# Patient Record
Sex: FEMALE | Race: WHITE | NOT HISPANIC OR LATINO
[De-identification: names, ages, dates, MRNs, and addresses within clinical notes are randomized per-mention and may not be internally consistent; named-entity substitution may affect disease eponyms.]

---

## 2020-11-30 PROBLEM — Z00.00 ENCOUNTER FOR PREVENTIVE HEALTH EXAMINATION: Status: ACTIVE | Noted: 2020-11-30

## 2020-12-02 ENCOUNTER — APPOINTMENT (OUTPATIENT)
Dept: RHEUMATOLOGY | Facility: CLINIC | Age: 70
End: 2020-12-02
Payer: MEDICARE

## 2020-12-02 VITALS
OXYGEN SATURATION: 96 % | TEMPERATURE: 98 F | HEIGHT: 62 IN | BODY MASS INDEX: 36.44 KG/M2 | WEIGHT: 198 LBS | DIASTOLIC BLOOD PRESSURE: 80 MMHG | SYSTOLIC BLOOD PRESSURE: 140 MMHG | HEART RATE: 77 BPM

## 2020-12-02 DIAGNOSIS — Z86.39 PERSONAL HISTORY OF OTHER ENDOCRINE, NUTRITIONAL AND METABOLIC DISEASE: ICD-10-CM

## 2020-12-02 DIAGNOSIS — Z78.9 OTHER SPECIFIED HEALTH STATUS: ICD-10-CM

## 2020-12-02 DIAGNOSIS — Z87.19 PERSONAL HISTORY OF OTHER DISEASES OF THE DIGESTIVE SYSTEM: ICD-10-CM

## 2020-12-02 DIAGNOSIS — Z82.61 FAMILY HISTORY OF ARTHRITIS: ICD-10-CM

## 2020-12-02 DIAGNOSIS — M79.10 MYALGIA, UNSPECIFIED SITE: ICD-10-CM

## 2020-12-02 DIAGNOSIS — Z86.79 PERSONAL HISTORY OF OTHER DISEASES OF THE CIRCULATORY SYSTEM: ICD-10-CM

## 2020-12-02 DIAGNOSIS — Z85.3 PERSONAL HISTORY OF MALIGNANT NEOPLASM OF BREAST: ICD-10-CM

## 2020-12-02 PROCEDURE — 99205 OFFICE O/P NEW HI 60 MIN: CPT

## 2020-12-02 RX ORDER — BUPROPION HYDROCHLORIDE 300 MG/1
300 TABLET, EXTENDED RELEASE ORAL
Refills: 0 | Status: ACTIVE | COMMUNITY

## 2020-12-02 RX ORDER — AMITRIPTYLINE HYDROCHLORIDE 25 MG/1
25 TABLET, FILM COATED ORAL
Refills: 0 | Status: ACTIVE | COMMUNITY

## 2020-12-02 RX ORDER — LOSARTAN POTASSIUM 100 MG/1
100 TABLET, FILM COATED ORAL
Refills: 0 | Status: ACTIVE | COMMUNITY

## 2020-12-02 RX ORDER — ROSUVASTATIN CALCIUM 20 MG/1
20 TABLET, FILM COATED ORAL
Refills: 0 | Status: ACTIVE | COMMUNITY

## 2020-12-02 RX ORDER — LETROZOLE 2.5 MG/1
2.5 TABLET, FILM COATED ORAL
Refills: 0 | Status: ACTIVE | COMMUNITY

## 2020-12-02 NOTE — ASSESSMENT
[FreeTextEntry1] : Patient has a history of breast cancer diagnosed 2 years ago s/p lumpectomy and radiation, HLD, HTN, IBS-D here for evaluation of pain.\par \par 1. Musculoskeletal pain\par \par -Based on history and physical exam she most likely has chronic pain/fibromyalgia. Discussed treatment options in detail and she wishes to increased duloxetine to 60 mg daily and start on flexeril 5 mg TID PRN. Advised exercise 150 minutes/week, increase healthy eating fruits/veggies at least 1/2 plate, eval/treatment of PRIYA.

## 2020-12-02 NOTE — HISTORY OF PRESENT ILLNESS
[FreeTextEntry1] : Patient has a history of breast cancer diagnosed 2 years ago s/p lumpectomy and radiation, HLD, HTN, IBS-D\par \par She reports pain in her arms and legs, feeling of bugs crawling on legs ongoing since 4 months. She saw twice that an ant was walking up her leg, and then again felt that sensation but no ant was present.  Femara was started June 2018. She was initially started on Arimidex and could not get up from the couch. Femara she stopped for 1 month and didn't notice a different in her symptoms and reports worsening symptoms. Now her pain stops her from ADLs. Denies joint pain, stiffness or swelling. Recently she fell and had x-rays on her knee that showed little arthritis. Reports IBS-D, headaches, memory fog. She can't take Advil because of IBS. She takes Tylenol for arthritis 2 tabs BID for the past two months, but is concerned about her liver. \par \par Denies rash, photosensitivity, raynaud's, oral ulcers, nasal ulcers, hair loss, sinus problems, nosebleeds, visual disturbances, dry eyes, dry mouth. \par \par She reports that her body pains started in 2008 after her  passed away. She saw Dr. Quiroz who diagnosed her with fibromyalgia.Over time the pain subsided and recently has returned. It involves her arms, forearms, thighs, and lower legs.

## 2020-12-02 NOTE — PHYSICAL EXAM
[General Appearance - Alert] : alert [General Appearance - In No Acute Distress] : in no acute distress [Sclera] : the sclera and conjunctiva were normal [PERRL With Normal Accommodation] : pupils were equal in size, round, and reactive to light [Extraocular Movements] : extraocular movements were intact [Outer Ear] : the ears and nose were normal in appearance [Oropharynx] : the oropharynx was normal [Neck Appearance] : the appearance of the neck was normal [Neck Cervical Mass (___cm)] : no neck mass was observed [Jugular Venous Distention Increased] : there was no jugular-venous distention [Thyroid Diffuse Enlargement] : the thyroid was not enlarged [Thyroid Nodule] : there were no palpable thyroid nodules [Auscultation Breath Sounds / Voice Sounds] : lungs were clear to auscultation bilaterally [Heart Rate And Rhythm] : heart rate was normal and rhythm regular [Heart Sounds] : normal S1 and S2 [Heart Sounds Gallop] : no gallops [Murmurs] : no murmurs [Heart Sounds Pericardial Friction Rub] : no pericardial rub [Bowel Sounds] : normal bowel sounds [Abdomen Soft] : soft [Abdomen Tenderness] : non-tender [Abdomen Mass (___ Cm)] : no abdominal mass palpated [Abnormal Walk] : normal gait [Nail Clubbing] : no clubbing  or cyanosis of the fingernails [Musculoskeletal - Swelling] : no joint swelling seen [Motor Tone] : muscle strength and tone were normal [FreeTextEntry1] : Mildly tender points bilateral upper extremities  [] : no rash [Skin Lesions] : no skin lesions [Sensation] : the sensory exam was normal to light touch and pinprick [Motor Exam] : the motor exam was normal [Affect] : the affect was normal [Mood] : the mood was normal

## 2020-12-03 DIAGNOSIS — M79.7 FIBROMYALGIA: ICD-10-CM

## 2020-12-12 ENCOUNTER — EMERGENCY (EMERGENCY)
Facility: HOSPITAL | Age: 70
LOS: 0 days | Discharge: HOME | End: 2020-12-12
Attending: STUDENT IN AN ORGANIZED HEALTH CARE EDUCATION/TRAINING PROGRAM
Payer: MEDICARE

## 2020-12-12 VITALS
SYSTOLIC BLOOD PRESSURE: 136 MMHG | HEIGHT: 62 IN | HEART RATE: 102 BPM | RESPIRATION RATE: 18 BRPM | DIASTOLIC BLOOD PRESSURE: 86 MMHG | TEMPERATURE: 99 F | WEIGHT: 190.04 LBS | OXYGEN SATURATION: 96 %

## 2020-12-12 VITALS
HEART RATE: 73 BPM | SYSTOLIC BLOOD PRESSURE: 130 MMHG | TEMPERATURE: 98 F | DIASTOLIC BLOOD PRESSURE: 69 MMHG | OXYGEN SATURATION: 95 % | RESPIRATION RATE: 18 BRPM

## 2020-12-12 DIAGNOSIS — R19.7 DIARRHEA, UNSPECIFIED: ICD-10-CM

## 2020-12-12 DIAGNOSIS — Z79.899 OTHER LONG TERM (CURRENT) DRUG THERAPY: ICD-10-CM

## 2020-12-12 DIAGNOSIS — N64.59 OTHER SIGNS AND SYMPTOMS IN BREAST: Chronic | ICD-10-CM

## 2020-12-12 DIAGNOSIS — N39.0 URINARY TRACT INFECTION, SITE NOT SPECIFIED: ICD-10-CM

## 2020-12-12 DIAGNOSIS — Z85.3 PERSONAL HISTORY OF MALIGNANT NEOPLASM OF BREAST: ICD-10-CM

## 2020-12-12 DIAGNOSIS — Z98.891 HISTORY OF UTERINE SCAR FROM PREVIOUS SURGERY: ICD-10-CM

## 2020-12-12 DIAGNOSIS — Z20.828 CONTACT WITH AND (SUSPECTED) EXPOSURE TO OTHER VIRAL COMMUNICABLE DISEASES: ICD-10-CM

## 2020-12-12 LAB
ALBUMIN SERPL ELPH-MCNC: 5.4 G/DL — HIGH (ref 3.5–5.2)
ALP SERPL-CCNC: 71 U/L — SIGNIFICANT CHANGE UP (ref 30–115)
ALT FLD-CCNC: 12 U/L — SIGNIFICANT CHANGE UP (ref 0–41)
ANION GAP SERPL CALC-SCNC: 11 MMOL/L — SIGNIFICANT CHANGE UP (ref 7–14)
APPEARANCE UR: CLEAR — SIGNIFICANT CHANGE UP
AST SERPL-CCNC: 15 U/L — SIGNIFICANT CHANGE UP (ref 0–41)
BACTERIA # UR AUTO: ABNORMAL /HPF
BASOPHILS # BLD AUTO: 0.03 K/UL — SIGNIFICANT CHANGE UP (ref 0–0.2)
BASOPHILS NFR BLD AUTO: 0.4 % — SIGNIFICANT CHANGE UP (ref 0–1)
BILIRUB SERPL-MCNC: 0.9 MG/DL — SIGNIFICANT CHANGE UP (ref 0.2–1.2)
BILIRUB UR-MCNC: ABNORMAL
BUN SERPL-MCNC: 16 MG/DL — SIGNIFICANT CHANGE UP (ref 10–20)
C DIFF BY PCR RESULT: NEGATIVE — SIGNIFICANT CHANGE UP
C DIFF TOX GENS STL QL NAA+PROBE: SIGNIFICANT CHANGE UP
CALCIUM SERPL-MCNC: 11.1 MG/DL — HIGH (ref 8.5–10.1)
CHLORIDE SERPL-SCNC: 103 MMOL/L — SIGNIFICANT CHANGE UP (ref 98–110)
CO2 SERPL-SCNC: 24 MMOL/L — SIGNIFICANT CHANGE UP (ref 17–32)
COD CRY URNS QL: NEGATIVE — SIGNIFICANT CHANGE UP
COLOR SPEC: YELLOW — SIGNIFICANT CHANGE UP
CREAT SERPL-MCNC: 1.2 MG/DL — SIGNIFICANT CHANGE UP (ref 0.7–1.5)
DIFF PNL FLD: NEGATIVE — SIGNIFICANT CHANGE UP
EOSINOPHIL # BLD AUTO: 0.13 K/UL — SIGNIFICANT CHANGE UP (ref 0–0.7)
EOSINOPHIL NFR BLD AUTO: 1.9 % — SIGNIFICANT CHANGE UP (ref 0–8)
EPI CELLS # UR: ABNORMAL /HPF
GLUCOSE SERPL-MCNC: 94 MG/DL — SIGNIFICANT CHANGE UP (ref 70–99)
GLUCOSE UR QL: NEGATIVE MG/DL — SIGNIFICANT CHANGE UP
GRAN CASTS # UR COMP ASSIST: NEGATIVE — SIGNIFICANT CHANGE UP
HCT VFR BLD CALC: 42.6 % — SIGNIFICANT CHANGE UP (ref 37–47)
HGB BLD-MCNC: 14 G/DL — SIGNIFICANT CHANGE UP (ref 12–16)
HYALINE CASTS # UR AUTO: NEGATIVE — SIGNIFICANT CHANGE UP
IMM GRANULOCYTES NFR BLD AUTO: 0.4 % — HIGH (ref 0.1–0.3)
KETONES UR-MCNC: 15
LACTATE SERPL-SCNC: 1.1 MMOL/L — SIGNIFICANT CHANGE UP (ref 0.7–2)
LEUKOCYTE ESTERASE UR-ACNC: ABNORMAL
LIDOCAIN IGE QN: 24 U/L — SIGNIFICANT CHANGE UP (ref 7–60)
LYMPHOCYTES # BLD AUTO: 2.03 K/UL — SIGNIFICANT CHANGE UP (ref 1.2–3.4)
LYMPHOCYTES # BLD AUTO: 29.9 % — SIGNIFICANT CHANGE UP (ref 20.5–51.1)
MAGNESIUM SERPL-MCNC: 1.5 MG/DL — LOW (ref 1.8–2.4)
MCHC RBC-ENTMCNC: 31.8 PG — HIGH (ref 27–31)
MCHC RBC-ENTMCNC: 32.9 G/DL — SIGNIFICANT CHANGE UP (ref 32–37)
MCV RBC AUTO: 96.8 FL — SIGNIFICANT CHANGE UP (ref 81–99)
MONOCYTES # BLD AUTO: 0.54 K/UL — SIGNIFICANT CHANGE UP (ref 0.1–0.6)
MONOCYTES NFR BLD AUTO: 8 % — SIGNIFICANT CHANGE UP (ref 1.7–9.3)
NEUTROPHILS # BLD AUTO: 4.03 K/UL — SIGNIFICANT CHANGE UP (ref 1.4–6.5)
NEUTROPHILS NFR BLD AUTO: 59.4 % — SIGNIFICANT CHANGE UP (ref 42.2–75.2)
NITRITE UR-MCNC: NEGATIVE — SIGNIFICANT CHANGE UP
NRBC # BLD: 0 /100 WBCS — SIGNIFICANT CHANGE UP (ref 0–0)
PH UR: 5.5 — SIGNIFICANT CHANGE UP (ref 5–8)
PLATELET # BLD AUTO: 267 K/UL — SIGNIFICANT CHANGE UP (ref 130–400)
POTASSIUM SERPL-MCNC: 4.2 MMOL/L — SIGNIFICANT CHANGE UP (ref 3.5–5)
POTASSIUM SERPL-SCNC: 4.2 MMOL/L — SIGNIFICANT CHANGE UP (ref 3.5–5)
PROT SERPL-MCNC: 7.8 G/DL — SIGNIFICANT CHANGE UP (ref 6–8)
PROT UR-MCNC: >=300 MG/DL
RAPID RVP RESULT: SIGNIFICANT CHANGE UP
RBC # BLD: 4.4 M/UL — SIGNIFICANT CHANGE UP (ref 4.2–5.4)
RBC # FLD: 13.1 % — SIGNIFICANT CHANGE UP (ref 11.5–14.5)
RBC CASTS # UR COMP ASSIST: NEGATIVE — SIGNIFICANT CHANGE UP
SARS-COV-2 RNA SPEC QL NAA+PROBE: SIGNIFICANT CHANGE UP
SODIUM SERPL-SCNC: 138 MMOL/L — SIGNIFICANT CHANGE UP (ref 135–146)
SP GR SPEC: >=1.03 (ref 1.01–1.03)
TRI-PHOS CRY UR QL COMP ASSIST: NEGATIVE — SIGNIFICANT CHANGE UP
URATE CRY FLD QL MICRO: NEGATIVE — SIGNIFICANT CHANGE UP
UROBILINOGEN FLD QL: 1 MG/DL (ref 0.2–0.2)
WBC # BLD: 6.79 K/UL — SIGNIFICANT CHANGE UP (ref 4.8–10.8)
WBC # FLD AUTO: 6.79 K/UL — SIGNIFICANT CHANGE UP (ref 4.8–10.8)
WBC UR QL: SIGNIFICANT CHANGE UP /HPF

## 2020-12-12 PROCEDURE — 99284 EMERGENCY DEPT VISIT MOD MDM: CPT | Mod: CS

## 2020-12-12 PROCEDURE — 74177 CT ABD & PELVIS W/CONTRAST: CPT | Mod: 26

## 2020-12-12 RX ORDER — SODIUM CHLORIDE 9 MG/ML
1000 INJECTION INTRAMUSCULAR; INTRAVENOUS; SUBCUTANEOUS ONCE
Refills: 0 | Status: COMPLETED | OUTPATIENT
Start: 2020-12-12 | End: 2020-12-12

## 2020-12-12 RX ORDER — AZTREONAM 2 G
1 VIAL (EA) INJECTION
Qty: 14 | Refills: 0
Start: 2020-12-12 | End: 2020-12-18

## 2020-12-12 RX ORDER — MAGNESIUM SULFATE 500 MG/ML
2 VIAL (ML) INJECTION ONCE
Refills: 0 | Status: COMPLETED | OUTPATIENT
Start: 2020-12-12 | End: 2020-12-12

## 2020-12-12 RX ADMIN — SODIUM CHLORIDE 1000 MILLILITER(S): 9 INJECTION INTRAMUSCULAR; INTRAVENOUS; SUBCUTANEOUS at 21:11

## 2020-12-12 RX ADMIN — SODIUM CHLORIDE 1000 MILLILITER(S): 9 INJECTION INTRAMUSCULAR; INTRAVENOUS; SUBCUTANEOUS at 19:19

## 2020-12-12 RX ADMIN — Medication 50 GRAM(S): at 21:11

## 2020-12-12 NOTE — ED ADULT NURSE NOTE - NSIMPLEMENTINTERV_GEN_ALL_ED
Implemented All Universal Safety Interventions:  [place] to call system. Call bell, personal items and telephone within reach. Instruct patient to call for assistance. Room bathroom lighting operational. Non-slip footwear when patient is off stretcher. Physically safe environment: no spills, clutter or unnecessary equipment. Stretcher in lowest position, wheels locked, appropriate side rails in place.

## 2020-12-12 NOTE — ED PROVIDER NOTE - NSFOLLOWUPINSTRUCTIONS_ED_ALL_ED_FT
Urinary Tract Infection    A urinary tract infection (UTI) is an infection of any part of the urinary tract, which includes the kidneys, ureters, bladder, and urethra. Risk factors include ignoring your need to urinate, wiping back to front if female, being an uncircumcised male, and having diabetes or a weak immune system. Symptoms include frequent urination, pain or burning with urination, foul smelling urine, cloudy urine, pain in the lower abdomen, blood in the urine, and fever. If you were prescribed an antibiotic medicine, take it as told by your health care provider. Do not stop taking the antibiotic even if you start to feel better.    SEEK IMMEDIATE MEDICAL CARE IF YOU HAVE ANY OF THE FOLLOWING SYMPTOMS: severe back or abdominal pain, fever, inability to keep fluids or medicine down, dizziness/lightheadedness, or a change in mental status.      Diarrhea    Diarrhea is frequent loose or watery bowel movements that has many causes. Diarrhea can make you feel weak and cause you to become dehydrated. Diarrhea typically lasts 2–3 days, but can last longer if it is a sign of something more serious. Drink clear fluids to prevent dehydration. Eat bland, easy-to-digest foods as tolerated.     SEEK IMMEDIATE MEDICAL CARE IF YOU HAVE ANY OF THE FOLLOWING SYMPTOMS: high fevers, lightheadedness/dizziness, chest pain, black or bloody stools, shortness of breath, severe abdominal or back pain, or any signs of dehydration.

## 2020-12-12 NOTE — ED PROVIDER NOTE - PATIENT PORTAL LINK FT
You can access the FollowMyHealth Patient Portal offered by [facility] by registering at the following website: [email or web address]. By joining [employer]’s FollowMyHealth portal, you will also be able to view your health information using other applications (apps) compatible with our system.

## 2020-12-12 NOTE — ED PROVIDER NOTE - PHYSICAL EXAMINATION
Vital Signs: I have reviewed the initial vital signs.  Constitutional: well-nourished, no acute distress  Eyes: PERRLA, EOMI, clear conjunctiva  ENT: MMM,   Cardiovascular: regular rate, regular rhythm, no murmur appreciated  Respiratory: unlabored respiratory effort, clear to auscultation bilaterally    Musculoskeletal: supple neck, no lower extremity edema, no bony tenderness  Integumentary: warm, dry, no rash  Neurologic: awake, alert, cranial nerves II-XII grossly intact, extremities’ motor and sensory functions grossly intact, no focal deficits, GCS 15

## 2020-12-12 NOTE — ED PROVIDER NOTE - PROGRESS NOTE DETAILS
spoke with patient regarding diagnostics . patient feeling improved. will dc home urology follow up and antibx

## 2020-12-12 NOTE — ED PROVIDER NOTE - NS ED ROS FT
Review of Systems    Constitutional: (-) fever/ chills (+)loss of appetite  Eyes (-) visual changes  ENT: (-) epistaxis (-) sore throat (-) ear pain  Cardiovascular: (-) chest pain, (-) syncope (-) palpitations  Respiratory: (-) cough, (-) shortness of breath  Gastrointestinal: (-) vomiting, (+) diarrhea (+) abdominal pain  : (-) dysuria , hematuria   neck: (-) neck pain or stiffness  Musculoskeletal:  (-) back pain, (-) joint pain   Integumentary: (-) rash, (-) swelling  Neurological: (-) headache, (-) altered mental status  Psychiatric: (-) hallucinations or depression

## 2020-12-12 NOTE — ED PROVIDER NOTE - CLINICAL SUMMARY MEDICAL DECISION MAKING FREE TEXT BOX
69 y/o female with hx of fibromyalgia, HTN, pancreatitis, c.diff presents to the ED with watery diarrhea with decreased po intake .patient c/o weakness and nausea. patient c/o mild abdominal cramping. patient denies any back pain. no dysuria or fever. patient with decreased urinary output. patient denies any cp, sob, palpitations. patient denies any black or bloody stools. Plan labs/CT d/c out pt f/u

## 2020-12-12 NOTE — ED PROVIDER NOTE - OBJECTIVE STATEMENT
69 y/o female with hx of fibromyalgia, HTN, pancreatitis, c.diff presents to the ED with watery diarrhea with decreased po intake .patient c/o weakness and nausea. patient c/o mild abdominal cramping. patient denies any back pain. no dysuria or fever. patient with decreased urinary output. patient denies any cp, sob, palpitations. patient denies any black or bloody stools.

## 2020-12-12 NOTE — ED PROVIDER NOTE - CARE PROVIDER_API CALL
Moreno Hanley)  Urology  [address] [facility] [facility] [place] [address]  Phone: [phone]  Fax: [phone]  Follow Up Time:

## 2020-12-14 ENCOUNTER — APPOINTMENT (OUTPATIENT)
Dept: UROLOGY | Facility: CLINIC | Age: 70
End: 2020-12-14

## 2020-12-14 LAB
CULTURE RESULTS: SIGNIFICANT CHANGE UP
SPECIMEN SOURCE: SIGNIFICANT CHANGE UP

## 2020-12-17 PROBLEM — C50.919 MALIGNANT NEOPLASM OF UNSPECIFIED SITE OF UNSPECIFIED FEMALE BREAST: Chronic | Status: ACTIVE | Noted: 2020-12-12

## 2020-12-17 PROBLEM — K85.10 BILIARY ACUTE PANCREATITIS WITHOUT NECROSIS OR INFECTION: Chronic | Status: ACTIVE | Noted: 2020-12-12

## 2021-02-02 ENCOUNTER — APPOINTMENT (OUTPATIENT)
Dept: GASTROENTEROLOGY | Facility: CLINIC | Age: 71
End: 2021-02-02

## 2021-02-09 ENCOUNTER — APPOINTMENT (OUTPATIENT)
Dept: GASTROENTEROLOGY | Facility: CLINIC | Age: 71
End: 2021-02-09
Payer: MEDICARE

## 2021-02-09 DIAGNOSIS — R10.9 UNSPECIFIED ABDOMINAL PAIN: ICD-10-CM

## 2021-02-09 PROCEDURE — 99204 OFFICE O/P NEW MOD 45 MIN: CPT | Mod: 95

## 2021-02-09 RX ORDER — CYCLOBENZAPRINE HYDROCHLORIDE 5 MG/1
5 TABLET, FILM COATED ORAL 3 TIMES DAILY
Qty: 90 | Refills: 2 | Status: DISCONTINUED | COMMUNITY
Start: 2020-12-02 | End: 2021-02-09

## 2021-02-09 NOTE — PHYSICAL EXAM
[General Appearance - Alert] : alert [Sclera] : the sclera and conjunctiva were normal [] : no respiratory distress [Skin Color & Pigmentation] : normal skin color and pigmentation [Oriented To Time, Place, And Person] : oriented to person, place, and time

## 2021-02-11 ENCOUNTER — APPOINTMENT (OUTPATIENT)
Dept: RHEUMATOLOGY | Facility: CLINIC | Age: 71
End: 2021-02-11
Payer: MEDICARE

## 2021-02-11 VITALS
HEART RATE: 80 BPM | SYSTOLIC BLOOD PRESSURE: 138 MMHG | OXYGEN SATURATION: 96 % | TEMPERATURE: 98 F | DIASTOLIC BLOOD PRESSURE: 78 MMHG

## 2021-02-11 PROCEDURE — 99213 OFFICE O/P EST LOW 20 MIN: CPT

## 2021-02-11 RX ORDER — DULOXETINE HYDROCHLORIDE 30 MG/1
30 CAPSULE, DELAYED RELEASE PELLETS ORAL
Refills: 0 | Status: DISCONTINUED | COMMUNITY
End: 2021-02-11

## 2021-02-11 RX ORDER — DULOXETINE HYDROCHLORIDE 60 MG/1
60 CAPSULE, DELAYED RELEASE PELLETS ORAL
Qty: 90 | Refills: 0 | Status: ACTIVE | COMMUNITY
Start: 2020-12-03 | End: 1900-01-01

## 2021-02-15 NOTE — PHYSICAL EXAM
[General Appearance - Alert] : alert [General Appearance - In No Acute Distress] : in no acute distress [PERRL With Normal Accommodation] : pupils were equal in size, round, and reactive to light [Sclera] : the sclera and conjunctiva were normal [Extraocular Movements] : extraocular movements were intact [Outer Ear] : the ears and nose were normal in appearance [Oropharynx] : the oropharynx was normal [Neck Appearance] : the appearance of the neck was normal [Neck Cervical Mass (___cm)] : no neck mass was observed [Jugular Venous Distention Increased] : there was no jugular-venous distention [Thyroid Diffuse Enlargement] : the thyroid was not enlarged [Thyroid Nodule] : there were no palpable thyroid nodules [Auscultation Breath Sounds / Voice Sounds] : lungs were clear to auscultation bilaterally [Heart Rate And Rhythm] : heart rate was normal and rhythm regular [Heart Sounds Gallop] : no gallops [Heart Sounds] : normal S1 and S2 [Murmurs] : no murmurs [Heart Sounds Pericardial Friction Rub] : no pericardial rub [Bowel Sounds] : normal bowel sounds [Abdomen Soft] : soft [Abdomen Tenderness] : non-tender [Abdomen Mass (___ Cm)] : no abdominal mass palpated [Abnormal Walk] : normal gait [Nail Clubbing] : no clubbing  or cyanosis of the fingernails [Musculoskeletal - Swelling] : no joint swelling seen [Motor Tone] : muscle strength and tone were normal [] : no rash [Skin Lesions] : no skin lesions [Sensation] : the sensory exam was normal to light touch and pinprick [Motor Exam] : the motor exam was normal [Affect] : the affect was normal [Mood] : the mood was normal [FreeTextEntry1] : Mildly tender points bilateral upper extremities

## 2021-02-15 NOTE — ASSESSMENT
[FreeTextEntry1] : Patient has a history of breast cancer diagnosed 2 years ago s/p lumpectomy and radiation, HLD, HTN, IBS-D here for evaluation of pain.\par \par 1. Fibromyalgia\par \par -Improved and stable\par -Discussed treatment options including increased exercise and healthy diet\par -Continue Cymbalta\par -Follow up with PCP and f/u PRN

## 2021-03-08 DIAGNOSIS — Z87.891 PERSONAL HISTORY OF NICOTINE DEPENDENCE: ICD-10-CM

## 2021-03-08 DIAGNOSIS — I10 ESSENTIAL (PRIMARY) HYPERTENSION: ICD-10-CM

## 2021-03-08 DIAGNOSIS — J94.9 PLEURAL CONDITION, UNSPECIFIED: ICD-10-CM

## 2021-03-08 DIAGNOSIS — K21.9 GASTRO-ESOPHAGEAL REFLUX DISEASE W/OUT ESOPHAGITIS: ICD-10-CM

## 2021-03-08 RX ORDER — OMEPRAZOLE 40 MG/1
40 CAPSULE, DELAYED RELEASE ORAL
Refills: 0 | Status: ACTIVE | COMMUNITY

## 2021-03-22 ENCOUNTER — APPOINTMENT (OUTPATIENT)
Dept: PULMONOLOGY | Facility: CLINIC | Age: 71
End: 2021-03-22
Payer: MEDICARE

## 2021-03-22 VITALS
SYSTOLIC BLOOD PRESSURE: 130 MMHG | DIASTOLIC BLOOD PRESSURE: 80 MMHG | RESPIRATION RATE: 12 BRPM | BODY MASS INDEX: 36.44 KG/M2 | HEIGHT: 62 IN | HEART RATE: 84 BPM | OXYGEN SATURATION: 96 % | WEIGHT: 198 LBS

## 2021-03-22 DIAGNOSIS — G47.33 OBSTRUCTIVE SLEEP APNEA (ADULT) (PEDIATRIC): ICD-10-CM

## 2021-03-22 PROCEDURE — 99213 OFFICE O/P EST LOW 20 MIN: CPT

## 2021-03-22 NOTE — HISTORY OF PRESENT ILLNESS
[Follow-Up - Routine Clinic] : a routine clinic follow-up of [None] : No associated symptoms are reported [CPAP] : CPAP [Fair Compliance] : fair compliance with treatment [Fair Tolerance] : fair tolerance of treatment [Fair Symptom Control] : fair symptom control [de-identified] : significant PRIYA on test has auto CPAP [de-identified] : has some isues . Had eye surgery preventing use.

## 2021-03-22 NOTE — ASSESSMENT
[FreeTextEntry1] : PLAN\par \par New supplies\par Continue the use of CPAP\par Weight loss\par F/U in 6 months\par The patient is benefitting from the CPAP\par Stress the need maintain better compliance  with the CPAP.\par

## 2021-04-02 NOTE — HISTORY OF PRESENT ILLNESS
[Home] : at home, [unfilled] , at the time of the visit. [Verbal consent obtained from patient] : the patient, [unfilled] [Medical Office: ([facility])___] : at the medical office located in  [FreeTextEntry4] : Aimee Christianson [de-identified] : 70 year old female average risk for CRC,  with hx colon polyps since the age of 8 years, anal fissures, presents with chronic diarrhea for years on daily imodium, non bloody, no weight loss, nausea, vomiting but lower pelvic crampy abdominal pain that does exacerbate after defecation. \par Last colonoscopy was in dec 2020, and EGD in 2020, awaiting results to be faxed. \par Was in ED this past Dec with unremarkable CBC, CMP, and contrast CT of abdomen .

## 2021-04-02 NOTE — ASSESSMENT
[FreeTextEntry1] : 70 year old female average risk for CRC,  with hx colon polyps since the age of 8 years, anal fissures, presents with chronic diarrhea (7-8  BMs daily)for years on daily imodium, non bloody, no weight loss, nausea, vomiting but lower pelvic crampy abdominal pain that does exacerbate after defecation. \par Last colonoscopy was in dec 2020, and EGD in 2020, awaiting results to be faxed. \par Was in ED this past Dec with unremarkable CBC, CMP, and contrast CT of abdomen . \par \par \par Likely IBS-D\par might need to repeat repeat EGD, colonoscopy after reviewing last procedures.\par Continue same meds for now. \par will f/up afterwards. \par will consider rifaximin as well. \par \par 3/2/2021:\par Obtained records which showed lymphocytic colitis on bx\par Rec: [id number] 2 tab tid PRN for diarhhea\par RTC if not better, will consider budesonide then \par No need to repeat scopes at this point.\par Spoke to pt and discussed plan.

## 2021-05-18 ENCOUNTER — RX RENEWAL (OUTPATIENT)
Age: 71
End: 2021-05-18

## 2021-09-07 ENCOUNTER — APPOINTMENT (OUTPATIENT)
Dept: GASTROENTEROLOGY | Facility: CLINIC | Age: 71
End: 2021-09-07

## 2021-10-06 PROBLEM — I10 ESSENTIAL HYPERTENSION: Status: ACTIVE | Noted: 2021-03-08

## 2021-10-08 ENCOUNTER — APPOINTMENT (OUTPATIENT)
Dept: NEUROLOGY | Facility: CLINIC | Age: 71
End: 2021-10-08
Payer: MEDICARE

## 2021-10-08 ENCOUNTER — NON-APPOINTMENT (OUTPATIENT)
Age: 71
End: 2021-10-08

## 2021-10-08 VITALS
HEART RATE: 76 BPM | DIASTOLIC BLOOD PRESSURE: 103 MMHG | HEIGHT: 62 IN | TEMPERATURE: 97 F | OXYGEN SATURATION: 98 % | BODY MASS INDEX: 36.44 KG/M2 | SYSTOLIC BLOOD PRESSURE: 153 MMHG | WEIGHT: 198 LBS
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
## 2021-10-08 DIAGNOSIS — G40.909 EPILEPSY, UNSPECIFIED, NOT INTRACTABLE, W/OUT STATUS EPILEPTICUS: ICD-10-CM

## 2021-10-08 DIAGNOSIS — G25.3 MYOCLONUS: ICD-10-CM

## 2021-10-08 PROCEDURE — 99203 OFFICE O/P NEW LOW 30 MIN: CPT

## 2021-10-08 NOTE — REVIEW OF SYSTEMS
[Ataxia] : ataxia [Frequent Falls] : frequent falls [Anxiety] : anxiety [Depression] : depression [Constipation] : constipation [Negative] : Integumentary

## 2021-10-08 NOTE — ASSESSMENT
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 On exam there is no sing of parkinsonism but patient reports vivid dreams. The jerky movement of the left hand is suggestive of myoclonus but would like to r/o seizure given the history of breast cancer and history of secondary seizure. Patient is also on multiple medications including Wellbutrin and Duloxetine with adverse reaction of seizure. On exam she has positive Boswell with mild brisk reflex for which I would like to r/o cervical myelopathy specially with fall history. \par \par - MRI brain \par - MRI C spine\par - REEG and 72 hours ambulatory EEG\par - Will monitor the tremor symptoms, but she will try to taper her off from Duloxetine. \par - RTC in 4-5 months

## 2021-10-08 NOTE — HISTORY OF PRESENT ILLNESS
[FreeTextEntry1] : MANDY THOMPSON is a 71 year old woman with history of fibromyalgia, breast cancer on remission, colitis, PRIYA and IBS presents as a new patient to be assessed for new onset left hand tremor and jerky movement of left hand for past year. Tremor mainly happens on holding objects. Denies tremor in the head, voice or in the right side. Denies stiffness or slow movement. She has history of depression and takes Wellbutrin. Also takes Elavil for Colitis and Duloxetine for fibromyalgia. Has 4 falls for past years. Reports walking up the curve and slipping on the floor. Denies dizziness or prodromal symptoms. Did not fall backward. Denies family history or tremor or relationship with drinking. She reports nightmare and vivid dreams every night. She has history of low back pain with no sciatica and received LSI for that. She reports chronic history of jerky movement in the left pinky but reports new onset jerky movement of left hand on sleep.

## 2021-10-08 NOTE — PHYSICAL EXAM
[FreeTextEntry1] : Mental status: Awake, alert and oriented x3.  Recent and remote memory intact.  Naming, repetition and comprehension intact.  Attention/concentration intact.  No dysarthria, no aphasia.  Fund of knowledge appropriate.  \par Cranial nerves: Pupils equally round and reactive to light, visual fields full, no nystagmus, extraocular muscles intact, V1 through V3 intact bilaterally and symmetric, face symmetric, hearing intact to finger rub, palate elevation symmetric, tongue was midline.\par Motor:  MRC grading 5/5 b/l UE/LE.   strength 5/5.  Normal tone and bulk.  No abnormal movements.  No rigidity. No bradykinesia. No resting tremor. Mild positional tremor on holding the cup. \par Sensation: Intact to light touch, proprioception, and pinprick. \par Coordination: No dysmetria on finger-to-nose and heel-to-shin.  No dysdiadokinesia.\par Reflexes: 3+ in bilateral UE/LE, downgoing toes bilaterally. (+) Boswell.\par Gait: Narrow and steady. No ataxia.  Romberg negative. Negative retropulsion. \par \par

## 2021-10-15 ENCOUNTER — TRANSCRIPTION ENCOUNTER (OUTPATIENT)
Age: 71
End: 2021-10-15

## 2021-10-19 DIAGNOSIS — K52.9 NONINFECTIVE GASTROENTERITIS AND COLITIS, UNSPECIFIED: ICD-10-CM

## 2021-10-19 DIAGNOSIS — Z86.010 PERSONAL HISTORY OF COLONIC POLYPS: ICD-10-CM

## 2021-10-19 DIAGNOSIS — K52.832 LYMPHOCYTIC COLITIS: ICD-10-CM

## 2021-10-20 ENCOUNTER — OUTPATIENT (OUTPATIENT)
Dept: OUTPATIENT SERVICES | Facility: HOSPITAL | Age: 71
LOS: 1 days | Discharge: HOME | End: 2021-10-20
Payer: MEDICARE

## 2021-10-20 ENCOUNTER — APPOINTMENT (OUTPATIENT)
Dept: NEUROLOGY | Facility: CLINIC | Age: 71
End: 2021-10-20
Payer: MEDICARE

## 2021-10-20 ENCOUNTER — RESULT REVIEW (OUTPATIENT)
Age: 71
End: 2021-10-20

## 2021-10-20 DIAGNOSIS — N64.59 OTHER SIGNS AND SYMPTOMS IN BREAST: Chronic | ICD-10-CM

## 2021-10-20 DIAGNOSIS — Z91.81 HISTORY OF FALLING: ICD-10-CM

## 2021-10-20 PROCEDURE — 70551 MRI BRAIN STEM W/O DYE: CPT | Mod: 26,MH

## 2021-10-20 PROCEDURE — 72141 MRI NECK SPINE W/O DYE: CPT | Mod: 26,MH

## 2021-10-20 PROCEDURE — 95816 EEG AWAKE AND DROWSY: CPT

## 2021-10-25 DIAGNOSIS — M50.90 CERVICAL DISC DISORDER, UNSPECIFIED, UNSPECIFIED CERVICAL REGION: ICD-10-CM

## 2021-10-26 ENCOUNTER — LABORATORY RESULT (OUTPATIENT)
Age: 71
End: 2021-10-26

## 2021-10-26 ENCOUNTER — OUTPATIENT (OUTPATIENT)
Dept: OUTPATIENT SERVICES | Facility: HOSPITAL | Age: 71
LOS: 1 days | Discharge: HOME | End: 2021-10-26

## 2021-10-26 DIAGNOSIS — Z11.59 ENCOUNTER FOR SCREENING FOR OTHER VIRAL DISEASES: ICD-10-CM

## 2021-10-26 DIAGNOSIS — N64.59 OTHER SIGNS AND SYMPTOMS IN BREAST: Chronic | ICD-10-CM

## 2021-10-29 ENCOUNTER — OUTPATIENT (OUTPATIENT)
Dept: OUTPATIENT SERVICES | Facility: HOSPITAL | Age: 71
LOS: 1 days | Discharge: HOME | End: 2021-10-29
Payer: MEDICARE

## 2021-10-29 ENCOUNTER — RESULT REVIEW (OUTPATIENT)
Age: 71
End: 2021-10-29

## 2021-10-29 ENCOUNTER — TRANSCRIPTION ENCOUNTER (OUTPATIENT)
Age: 71
End: 2021-10-29

## 2021-10-29 VITALS — DIASTOLIC BLOOD PRESSURE: 84 MMHG | HEART RATE: 62 BPM | SYSTOLIC BLOOD PRESSURE: 142 MMHG

## 2021-10-29 VITALS
SYSTOLIC BLOOD PRESSURE: 139 MMHG | RESPIRATION RATE: 18 BRPM | TEMPERATURE: 98 F | HEIGHT: 62 IN | DIASTOLIC BLOOD PRESSURE: 82 MMHG | HEART RATE: 71 BPM | WEIGHT: 190.04 LBS | OXYGEN SATURATION: 96 %

## 2021-10-29 DIAGNOSIS — N64.59 OTHER SIGNS AND SYMPTOMS IN BREAST: Chronic | ICD-10-CM

## 2021-10-29 PROCEDURE — 45385 COLONOSCOPY W/LESION REMOVAL: CPT

## 2021-10-29 PROCEDURE — 45380 COLONOSCOPY AND BIOPSY: CPT | Mod: XU

## 2021-10-29 PROCEDURE — 88305 TISSUE EXAM BY PATHOLOGIST: CPT | Mod: 26

## 2021-10-29 RX ORDER — SODIUM CHLORIDE 9 MG/ML
1000 INJECTION, SOLUTION INTRAVENOUS
Refills: 0 | Status: DISCONTINUED | OUTPATIENT
Start: 2021-10-29 | End: 2021-11-12

## 2021-10-29 NOTE — ASU DISCHARGE PLAN (ADULT/PEDIATRIC) - CARE PROVIDER_API CALL
Lorrie Babin (MD)  Gastroenterology  [address] [place] [address]  Phone: [phone]  Fax: [phone]  Follow Up Time:

## 2021-10-29 NOTE — ASU PATIENT PROFILE, ADULT - PRO INTERPRETER NEED 2
Post-Care Instructions: Detail post-care instructions were given and reviewed. Patient is to keep the biopsy site dry overnight, and then apply vaseline daily until healed. Discussed to call the office if any questions or problems at [phone]. Post-Care Instructions: Detail post-care instructions were given and reviewed. Patient is to keep the biopsy site dry overnight, and then apply vaseline daily until healed. Discussed to call the office if any questions or problems at [phone]. English

## 2021-11-01 LAB — SURGICAL PATHOLOGY STUDY: SIGNIFICANT CHANGE UP

## 2021-11-04 DIAGNOSIS — Z88.0 ALLERGY STATUS TO PENICILLIN: ICD-10-CM

## 2021-11-04 DIAGNOSIS — D12.3 BENIGN NEOPLASM OF TRANSVERSE COLON: ICD-10-CM

## 2021-11-04 DIAGNOSIS — D12.8 BENIGN NEOPLASM OF RECTUM: ICD-10-CM

## 2021-11-04 DIAGNOSIS — K64.8 OTHER HEMORRHOIDS: ICD-10-CM

## 2021-11-04 DIAGNOSIS — Z85.3 PERSONAL HISTORY OF MALIGNANT NEOPLASM OF BREAST: ICD-10-CM

## 2021-11-04 DIAGNOSIS — R19.7 DIARRHEA, UNSPECIFIED: ICD-10-CM

## 2021-11-04 DIAGNOSIS — D12.4 BENIGN NEOPLASM OF DESCENDING COLON: ICD-10-CM

## 2021-11-04 DIAGNOSIS — K57.30 DIVERTICULOSIS OF LARGE INTESTINE WITHOUT PERFORATION OR ABSCESS WITHOUT BLEEDING: ICD-10-CM

## 2021-11-26 ENCOUNTER — EMERGENCY (EMERGENCY)
Facility: HOSPITAL | Age: 71
LOS: 0 days | Discharge: HOME | End: 2021-11-27
Attending: STUDENT IN AN ORGANIZED HEALTH CARE EDUCATION/TRAINING PROGRAM | Admitting: STUDENT IN AN ORGANIZED HEALTH CARE EDUCATION/TRAINING PROGRAM
Payer: MEDICARE

## 2021-11-26 VITALS
OXYGEN SATURATION: 98 % | HEART RATE: 80 BPM | TEMPERATURE: 97 F | SYSTOLIC BLOOD PRESSURE: 140 MMHG | RESPIRATION RATE: 18 BRPM | DIASTOLIC BLOOD PRESSURE: 92 MMHG

## 2021-11-26 DIAGNOSIS — K56.7 ILEUS, UNSPECIFIED: ICD-10-CM

## 2021-11-26 DIAGNOSIS — R19.7 DIARRHEA, UNSPECIFIED: ICD-10-CM

## 2021-11-26 DIAGNOSIS — Z88.0 ALLERGY STATUS TO PENICILLIN: ICD-10-CM

## 2021-11-26 DIAGNOSIS — R14.0 ABDOMINAL DISTENSION (GASEOUS): ICD-10-CM

## 2021-11-26 DIAGNOSIS — R10.9 UNSPECIFIED ABDOMINAL PAIN: ICD-10-CM

## 2021-11-26 DIAGNOSIS — K59.00 CONSTIPATION, UNSPECIFIED: ICD-10-CM

## 2021-11-26 DIAGNOSIS — N64.59 OTHER SIGNS AND SYMPTOMS IN BREAST: Chronic | ICD-10-CM

## 2021-11-26 DIAGNOSIS — E78.5 HYPERLIPIDEMIA, UNSPECIFIED: ICD-10-CM

## 2021-11-26 DIAGNOSIS — Z87.19 PERSONAL HISTORY OF OTHER DISEASES OF THE DIGESTIVE SYSTEM: ICD-10-CM

## 2021-11-26 LAB
ALBUMIN SERPL ELPH-MCNC: 4.4 G/DL — SIGNIFICANT CHANGE UP (ref 3.5–5.2)
ALP SERPL-CCNC: 78 U/L — SIGNIFICANT CHANGE UP (ref 30–115)
ALT FLD-CCNC: 11 U/L — SIGNIFICANT CHANGE UP (ref 0–41)
ANION GAP SERPL CALC-SCNC: 14 MMOL/L — SIGNIFICANT CHANGE UP (ref 7–14)
APPEARANCE UR: CLEAR — SIGNIFICANT CHANGE UP
AST SERPL-CCNC: 10 U/L — SIGNIFICANT CHANGE UP (ref 0–41)
BASOPHILS # BLD AUTO: 0.02 K/UL — SIGNIFICANT CHANGE UP (ref 0–0.2)
BASOPHILS NFR BLD AUTO: 1.1 % — HIGH (ref 0–1)
BILIRUB SERPL-MCNC: 0.7 MG/DL — SIGNIFICANT CHANGE UP (ref 0.2–1.2)
BILIRUB UR-MCNC: NEGATIVE — SIGNIFICANT CHANGE UP
BUN SERPL-MCNC: 10 MG/DL — SIGNIFICANT CHANGE UP (ref 10–20)
CALCIUM SERPL-MCNC: 8.8 MG/DL — SIGNIFICANT CHANGE UP (ref 8.5–10.1)
CHLORIDE SERPL-SCNC: 98 MMOL/L — SIGNIFICANT CHANGE UP (ref 98–110)
CO2 SERPL-SCNC: 26 MMOL/L — SIGNIFICANT CHANGE UP (ref 17–32)
COLOR SPEC: YELLOW — SIGNIFICANT CHANGE UP
CREAT SERPL-MCNC: 0.7 MG/DL — SIGNIFICANT CHANGE UP (ref 0.7–1.5)
DIFF PNL FLD: NEGATIVE — SIGNIFICANT CHANGE UP
EOSINOPHIL # BLD AUTO: 0.02 K/UL — SIGNIFICANT CHANGE UP (ref 0–0.7)
EOSINOPHIL NFR BLD AUTO: 1.1 % — SIGNIFICANT CHANGE UP (ref 0–8)
GLUCOSE SERPL-MCNC: 102 MG/DL — HIGH (ref 70–99)
GLUCOSE UR QL: NEGATIVE MG/DL — SIGNIFICANT CHANGE UP
HCT VFR BLD CALC: 44.1 % — SIGNIFICANT CHANGE UP (ref 37–47)
HGB BLD-MCNC: 14.2 G/DL — SIGNIFICANT CHANGE UP (ref 12–16)
IMM GRANULOCYTES NFR BLD AUTO: 1.1 % — HIGH (ref 0.1–0.3)
KETONES UR-MCNC: NEGATIVE — SIGNIFICANT CHANGE UP
LEUKOCYTE ESTERASE UR-ACNC: NEGATIVE — SIGNIFICANT CHANGE UP
LIDOCAIN IGE QN: 30 U/L — SIGNIFICANT CHANGE UP (ref 7–60)
LYMPHOCYTES # BLD AUTO: 1.21 K/UL — SIGNIFICANT CHANGE UP (ref 1.2–3.4)
LYMPHOCYTES # BLD AUTO: 65.4 % — HIGH (ref 20.5–51.1)
MCHC RBC-ENTMCNC: 30.3 PG — SIGNIFICANT CHANGE UP (ref 27–31)
MCHC RBC-ENTMCNC: 32.2 G/DL — SIGNIFICANT CHANGE UP (ref 32–37)
MCV RBC AUTO: 94.2 FL — SIGNIFICANT CHANGE UP (ref 81–99)
MONOCYTES # BLD AUTO: 0.42 K/UL — SIGNIFICANT CHANGE UP (ref 0.1–0.6)
MONOCYTES NFR BLD AUTO: 22.7 % — HIGH (ref 1.7–9.3)
NEUTROPHILS # BLD AUTO: 0.16 K/UL — LOW (ref 1.4–6.5)
NEUTROPHILS NFR BLD AUTO: 8.6 % — LOW (ref 42.2–75.2)
NITRITE UR-MCNC: NEGATIVE — SIGNIFICANT CHANGE UP
NRBC # BLD: 0 /100 WBCS — SIGNIFICANT CHANGE UP (ref 0–0)
PH UR: 6.5 — SIGNIFICANT CHANGE UP (ref 5–8)
PLAT MORPH BLD: NORMAL — SIGNIFICANT CHANGE UP
PLATELET # BLD AUTO: 190 K/UL — SIGNIFICANT CHANGE UP (ref 130–400)
PLATELET CLUMP BLD QL SMEAR: ABNORMAL
POTASSIUM SERPL-MCNC: 3.9 MMOL/L — SIGNIFICANT CHANGE UP (ref 3.5–5)
POTASSIUM SERPL-SCNC: 3.9 MMOL/L — SIGNIFICANT CHANGE UP (ref 3.5–5)
PROT SERPL-MCNC: 6.5 G/DL — SIGNIFICANT CHANGE UP (ref 6–8)
PROT UR-MCNC: NEGATIVE MG/DL — SIGNIFICANT CHANGE UP
RBC # BLD: 4.68 M/UL — SIGNIFICANT CHANGE UP (ref 4.2–5.4)
RBC # FLD: 13.1 % — SIGNIFICANT CHANGE UP (ref 11.5–14.5)
RBC BLD AUTO: NORMAL — SIGNIFICANT CHANGE UP
SODIUM SERPL-SCNC: 138 MMOL/L — SIGNIFICANT CHANGE UP (ref 135–146)
SP GR SPEC: 1.01 — SIGNIFICANT CHANGE UP (ref 1.01–1.03)
UROBILINOGEN FLD QL: 0.2 MG/DL — SIGNIFICANT CHANGE UP
WBC # BLD: 1.9 K/UL — LOW (ref 4.8–10.8)
WBC # FLD AUTO: 1.9 K/UL — LOW (ref 4.8–10.8)

## 2021-11-26 PROCEDURE — 74177 CT ABD & PELVIS W/CONTRAST: CPT | Mod: 26,MA

## 2021-11-26 PROCEDURE — 99284 EMERGENCY DEPT VISIT MOD MDM: CPT

## 2021-11-26 RX ORDER — SODIUM CHLORIDE 9 MG/ML
1000 INJECTION INTRAMUSCULAR; INTRAVENOUS; SUBCUTANEOUS ONCE
Refills: 0 | Status: COMPLETED | OUTPATIENT
Start: 2021-11-26 | End: 2021-11-26

## 2021-11-26 RX ORDER — IOHEXOL 300 MG/ML
30 INJECTION, SOLUTION INTRAVENOUS ONCE
Refills: 0 | Status: COMPLETED | OUTPATIENT
Start: 2021-11-26 | End: 2021-11-26

## 2021-11-26 RX ORDER — FAMOTIDINE 10 MG/ML
20 INJECTION INTRAVENOUS ONCE
Refills: 0 | Status: COMPLETED | OUTPATIENT
Start: 2021-11-26 | End: 2021-11-26

## 2021-11-26 RX ADMIN — Medication 30 MILLILITER(S): at 19:39

## 2021-11-26 RX ADMIN — IOHEXOL 30 MILLILITER(S): 300 INJECTION, SOLUTION INTRAVENOUS at 19:39

## 2021-11-26 RX ADMIN — SODIUM CHLORIDE 1000 MILLILITER(S): 9 INJECTION INTRAMUSCULAR; INTRAVENOUS; SUBCUTANEOUS at 19:39

## 2021-11-26 RX ADMIN — FAMOTIDINE 104 MILLIGRAM(S): 10 INJECTION INTRAVENOUS at 19:38

## 2021-11-26 NOTE — ED PROVIDER NOTE - NSFOLLOWUPINSTRUCTIONS_ED_ALL_ED_FT
Abdominal Pain    Many things can cause abdominal pain. Usually, abdominal pain is not caused by a disease and will improve without treatment. Your health care provider will do a physical exam and possibly order blood tests and imaging to help determine the seriousness of your pain. However, in many cases, no cause may be found and you may need further testing as an outpatient. Monitor your abdominal pain for any changes.     SEEK IMMEDIATE MEDICAL CARE IF YOU HAVE THE FOLLOWING SYMPTOMS: worsening abdominal pain, vomiting, diarrhea, inability to have bowel movements or pass gas, black or bloody stool, fever accompanying chest pain or back pain, or dizziness/lightheadedness.
No

## 2021-11-26 NOTE — ED PROVIDER NOTE - PHYSICAL EXAMINATION
CONSTITUTIONAL: Well-appearing; well-nourished; in no apparent distress.   EYES: PERRL; EOM intact.   CARDIOVASCULAR: Normal S1, S2; no murmurs, rubs, or gallops.   RESPIRATORY: Normal chest excursion with respiration; breath sounds clear and equal bilaterally; no wheezes, rhonchi, or rales.  GI/: mildly distended abdomen. non-tender and soft. Normal bowel sounds; no palpable organomegaly.   MS: No calf swelling and tenderness.  SKIN: Normal for age and race; warm; dry; good turgor; no apparent lesions or exudate.   NEURO/PSYCH: A & O x 4; grossly unremarkable.

## 2021-11-26 NOTE — ED PROVIDER NOTE - OBJECTIVE STATEMENT
[facility] PALLIATIVE CARE SPECIALTY CONSULT NOTE    Patient: Umair Vera Jr Date: 2019   : 1963 Attending: Ashley Lion MD   55 year old male PCP: Jaxon Dotson MD   MRN: [id number]  Date of admission: 2019     Consult requested by  to assist with goal clarification in context of cardiac disease.    Previous palliative care encounters: yes    Chief Complaint: seizures    History of Present Illness:  55 year old male with history OHTx from 2011, maintained on Cellcept and Tacrolimus w ith stable graft function and known Coronary artery vasculopathy grade III, L Parietal Temporal MCA Stroke  or prior, Intractable Partial Complex Seizure Disorder, CAD stent , RUL mass Blastomycosis, Non compliance regular use Sz meds, DM2, HLD, HTN, Cocaine use, last positive 19, Smoker,  who presents with possible seizure like acitivity. Palliative care was consulted for Goals clarification.    At the time of my evaluation, patient was walking around the room. Friend was on bedside. He denied pain, shortness of breath, nausea and vomiting, constipation, denies feeling anxious or depressed,his appetite is good, sleeps well.    Review of Systems  See HPI for pertinent positive. Remainder of 14-point review negative..     ALLERGIES:   Allergen Reactions   • Dilantin PRURITUS   • Phenytoin Sodium Extended PRURITUS   • Lisinopril Cough       Medications  Reviewed in EPIC and notable for:   • insulin lispro   Subcutaneous TID AC   • clopidogrel  75 mg Oral Daily   • insulin glargine  20 Units Subcutaneous Nightly   • pravastatin  40 mg Oral Daily   • sodium chloride (PF)  2 mL Injection 2 times per day   • enoxaparin (LOVENOX) injection  40 mg Subcutaneous Daily   • gabapentin  600 mg Oral Nightly   • gabapentin  300 mg Oral Daily   • tacrolimus  1 mg Oral BID   • mycophenolate  1,000 mg Oral BID   • levETIRAcetam  2,000 mg Oral BID   • sertraline  50 mg Oral Daily     PRN medications used in  last 24h include: Potassium.    Past Medical History:   Diagnosis Date   • Anemia    • Cardiac failure congestive    • Cocaine use     none for several years.    • Cognitive changes started in 2011    impaired memory, conprehension, some speech difficulty - result of sttroke / seizures since 2011- receiving speech therapy  (2013) and is improving   • Colitis, ischemic (CMS/[facility])    • Depression    • Diabetes mellitus type II    • DM (diabetes mellitus) (CMS/[facility])     Type II   • Dyslipidemia    • Esophageal reflux    • Essential hypertension 6/7/2016   • Fall 07/2017   • Heart transplanted (CMS/[facility]) 9/28/2011   • History of ischemic left MCA stroke 2010    seen as old in 2011 presented with Sx in Oct 2011 and no new stroke   • History of status epilepticus 2012   • HTN (hypertension)     systemic   • Hyperlipidemia    • Ischemic cardiomyopathy    • Mixed hyperlipidemia 6/7/2016   • Old myocardial infarction    • Other chronic pain    • Paroxysmal atrial fibrillation (CMS/[facility])    • PONV (postoperative nausea and vomiting)    • Pulmonary HTN (CMS/[facility])    • Seizures (CMS/[facility]) 2011    started after heart transplent, last seizure last 11/22/2017, small seizure   • Tobacco abuse     none for several years   • Uncomplicated senile dementia    • Use of cane as ambulatory aid    • Wears glasses        Past Surgical History:   Procedure Laterality Date   • Abdomen surgery  6/19/2010    right hemicolectomy - ischemic colitis   • Cardiac catherization  11/8/11   • Cardiac surgery  9/28/2011    PFO closure post transplant   • Cdl cath poss ptca  11/29/2017   • Colectomy  6/19/2010    right hemicolectomy - ischemic colitis   • Coronary angiogram - cv  9/17/2012    annual check up    • Coronary stent placement  9/17/2012    Resolute KAYLIE to mid LAD   • Heart transplant  9/28/2011   • Hemorrhoid surgery  11/2010    internal   • Icd implant  12/3/2008    pre heart transplant   • Left ventricular assist device  1/18/2010   • Myocardial  biopsy  10/18/2011, 11/8/11, 1/24/2012, 2/22/2012, 3/21/12    last biopsy 7/16/2012   • Patent foramen ovale closure  10/10/2011   • Ptca     • Service to gastroenterology  11/4/2008    colonoscopy with polyp removal   • Service to gastroenterology  2011    colonoscopy       Objective  Vital Last Value 24 Hour Range (min/max)   Temperature 98.2 °F (36.8 °C) (02/24/19 1300) Temp  Min: 97.8 °F (36.6 °C)  Max: 98.2 °F (36.8 °C)   Pulse 110 (02/24/19 1650) Pulse  Min: 83  Max: 110   Respiratory 20 (02/24/19 1650) Resp  Min: 18  Max: 20   Non-Invasive Blood Pressure 132/84 (02/24/19 1650) BP  Min: 132/84  Max: 146/87   Pulse Oximetry 97 % (02/24/19 1650) SpO2  Min: 94 %  Max: 98 %   Arterial Blood Pressure   No Data Recorded   Last BM:       Intake/Output Summary (Last 24 hours) at 2/24/2019 1855  Last data filed at 2/24/2019 1325  Gross per 24 hour   Intake 560 ml   Output 2650 ml   Net -2090 ml     Recent weights:   Weight    02/22/19 1859 02/22/19 2157 02/23/19 0500 02/24/19 0500   Weight: 99.4 kg 93.5 kg 93 kg 94 kg     Exam  Constitutional:  He appears well-developed and well-nourished.  No distress.   HEENT:   Head:  Normocephalic and atraumatic.   Right Ear:  External ear normal.   Left Ear:  External ear normal.   Mouth/Throat:  Oropharynx is clear and moist.  No oropharyngeal exudate.   Eyes:  Conjunctivae and EOM are normal.  Pupils are equal, round, and reactive to light.  Right eye exhibits no discharge.  Left eye exhibits no discharge.  No scleral icterus.   Neck:  Normal range of motion.  Neck supple.  No tracheal deviation present.  No thyromegaly present.    Cardiovascular:  Normal rate, regular rhythm and normal heart sounds.   No murmur heard.  Pulmonary/Chest:  Effort normal and breath sounds normal.  No stridor.  No respiratory distress.  He has no wheezes.   Musculoskeletal:  Normal range of motion.  He exhibits no edema or tenderness.   Lymphadenopathy:  He has no cervical adenopathy.   Skin:  Skin  is warm and dry.  No rash noted.  He is not diaphoretic.  No erythema.  No pallor.   Psychiatric:  He has a normal mood and affect.  His behavior is normal        Labs  Reviewed in EPIC and notable for:  Recent Labs   Lab 19   SODIUM 142 140 138   POTASSIUM 3.9 3.9 4.6   BUN 11 11 12   CREATININE 0.78 0.80 0.82   ALBUMIN  --   --  3.6   AST  --   --  25   GPT  --   --  19   ALKPT  --   --  103   BILIRUBIN  --   --  0.9     Recent Labs   Lab 19   WBC 6.5 8.5  --  8.2   HGB 15.8 15.3  --  15.6    199  --  204   INR 1.1  --  1.1  --        Imaging, Procedures  Reviewed in EPIC and notable for:  IMPRESSION:   1. No evidence of acute intracranial hemorrhage.  2. Stable encephalomalacia of the left occipital/parietal lobe, at the site  of old injury.       PALLIATIVE CARE ASSESSMENT     MEDICAL Primary Dx:   OHTX (orthotopic heart transplant) 2011  [facility]  History of seizures  Secondary Dx:  Hx Blastomycosis, treated   Hx polysubstance abuse, cocaine   CVA  Co-morbidities:   AD stent 2012, RUL mass Blastomycosis, Non compliance regular use Sz meds, DM2, HLD, HTN, Cocaine use, last positive 2--19, Smoker,    Functional status (current):  ECO: fully active, able to carry on all pre-disease performance without restriction    Prognosis:  · Depends his ability to comply with treatment  · Basis for estimate: clinical assessment  · High risk of death within 1 year? no     PSYCHOSOCIAL,  SPIRITUAL,  CULTURAL     Current living situation: at home with Mother  Home care services: none  Family and support system: Mother, sister    Habits (tobacco, alcohol, drugs):  Social History     Tobacco Use   • Smoking status: Current Every Day Smoker     Packs/day: 0.50     Years: 20.00     Pack years: 10.00     Types: Cigarettes   • Smokeless tobacco: Never Used   • Tobacco comment: 5 cigarettes/daily   Substance Use Topics   •  Alcohol use: Yes     Alcohol/week: 4.8 oz     Types: 4 Glasses of wine, 4 Cans of beer per week     Comment: Liter of vodka, and mini bottles    • Drug use: No     Comment: nothing recently     Education/occupation/hobbies: works with     Spiritual history, using HOPE tool:          sources of Hope, meaning, comfort, strength include family, friends          part of an Organized Hindu, importance of this: Amish          Personal spiritual beliefs, relationship with higher power: yes          Effects on medical care and end of life issues: none    Other concerns: cocaine abuse     CARE TRANSITIONS   · Anticipated change in disposition? maybe     ETHICAL,  LEGAL · Decision-making capacity: decisional.  · Advanced Directive: no document  · POAHC: Mother, 2nd agent sister  · Code Status preference, per document: FULL CODE  · Does current code status align with advance directive? Yes     GOALS OF CARE Goals of care are already clear. is to prolong life, continue treatment, Full code.    Patient mentions he was running around during the day he had seizures took his seizure medications late, feels had seizures after that. He says he has been compliant with taking medications otherwise. He does not remember names of medications, that he was taking but says recently dose were changed. Denies using cocaine but says is he is around friends who does cocaine. He does mention his goals are to prolong life and continue all the aggressive treatment. He wants to be Full code.       RECOMMENDATIONS  1. GOC/Palliative care encounter- Full code, goals is to continue all aggressive treatment, His compliance with medication and cocaine abuse has been concerning. We will keep on following to clarify further goals.    Thank you, Dr. Pagan, for involving us in the care of your patient. We will follow along closely with you.    Plan of care discussed with: bedside nurse.   Total encounter time: 50 minutes, with the majority of  this time spent counseling and coordinating care.       Neli Bob MD  [facility]  Answering Service: [phone]   70 yo female hx of breast cancer in remission/ pancreatitis/ IBS present c/o abdominal discomfort with constipation for 4 days and started experiencing pain on swallowing today. reported she spoke with her provider who advised her to come to ED to rule out obstruction. as per patient, she started having multiple watery diarrhea and vomiting after she returned from [place] 10 days ago. She was admitted in [facility] for 4 days and treated her with abx. reported diarrhea resolved and became constipation in the past few days.  Denies fever/chill/nausea/vomiting and urinary sxs. Denies HA/dizziness/chest pain/sob/palpitations and pain to extremities and ambulatory difficulty.

## 2021-11-26 NOTE — ED PROVIDER NOTE - PATIENT PORTAL LINK FT
You can access the FollowMyHealth Patient Portal offered by [facility] by registering at the following website: [email or web address]. By joining [employer]’s FollowMyHealth portal, you will also be able to view your health information using other applications (apps) compatible with our system.

## 2021-11-26 NOTE — ED ADULT NURSE NOTE - CHIEF COMPLAINT QUOTE
abdominal discomfort and diarrhea that progressed to constipation present since trip to [place] on Sj 11/15

## 2021-11-26 NOTE — ED PROVIDER NOTE - PROGRESS NOTE DETAILS
incidental low WBC found in blood work. reported she had similar result last week when she was in [facility]. advised f/u with PMD to repeat cbc in 1-2 weeks.

## 2021-11-26 NOTE — ED ADULT TRIAGE NOTE - CHIEF COMPLAINT QUOTE
abdominal discomfort and diarrhea that progressed to constipation present since trip to [place] on Sj 11/15

## 2021-11-26 NOTE — ED PROVIDER NOTE - CLINICAL SUMMARY MEDICAL DECISION MAKING FREE TEXT BOX
79F with pmh HLD, gallstone pancreatitis, IBS followed by Dr Richmond, breast cancer 4yrs ago s/p radiation, who presents to [facility] for worsening abdominal distention, and inability to have a bowel movement since d/c from [facility] on Friday. Exam benign, labs reviewed.

## 2021-11-26 NOTE — ED PROVIDER NOTE - ATTENDING CONTRIBUTION TO CARE
79F with pmh HLD, gallstone pancreatitis, IBS followed by Dr Richmond, breast cancer 4yrs ago s/p radiation, who presents to [facility] for worsening abdominal distention, and inability to have a bowel movement since d/c from [facility] on Friday. Hx goes back to when she [place] from [facility] [place] and experienced vomiting + diarrhea, went to [facility], was given abx, and was hospitalized x4d. At the time she was evaluated by GI, cardiology, and was d/c home 7d ago. She reports intermittent vomiting Monday and Tuesday but has been passing gas. Reports Tm last night of 99.5. Denies abd pain, flank pain, bloody stool, urinary sx.     Gen - NAD, Head - NCAT, Pharynx - clear, MMM, Heart - RRR, no m/g/r, Lungs - CTAB, no w/c/r, Abdomen - soft, NT, ND, Skin - No rash, Extremities - FROM, no edema, erythema, ecchymosis, brisk cap refill, Neuro - A&O x3, equal strength and sensation, non-focal exam    a/p:  abd distention, no BM  ileus, doubt SBO, enteritis, colitis   ct a/p with po and iv contrast   labs, ua, ivf, reassess 79F with pmh HLD, gallstone pancreatitis, IBS followed by Dr Richmond, breast cancer 4yrs ago s/p radiation, who presents to [facility] for worsening abdominal distention, and inability to have a bowel movement since d/c from [facility] on Friday. + Passing gas. Hx goes back to when she returned from [facility] 11/15 and experienced vomiting + diarrhea, went to [facility], was given abx, and was hospitalized x4d. At the time she was evaluated by GI, cardiology, and was d/c home 7d ago. She reports intermittent vomiting Monday and Tuesday but has been passing gas. Reports Tm last night of 99.5. Denies any pain, bloody stool, urinary sx.     Gen - NAD, Head - NCAT, Pharynx - clear, MMM, Heart - RRR, no m/g/r, Lungs - CTAB, no w/c/r, Abdomen - soft, NT, ND, Skin - No rash, Extremities - FROM, no edema, erythema, ecchymosis, brisk cap refill, Neuro - A&O x3, equal strength and sensation, non-focal exam    a/p:  abd distention, no BM  ileus, doubt SBO, enteritis, colitis   ct a/p with po and iv contrast   labs, ua, ivf, reassess

## 2021-11-26 NOTE — ED PROVIDER NOTE - CARE PROVIDER_API CALL
Lorrie Babin)  Gastroenterology  [address] [place] [address]  Phone: [phone]  Fax: [phone]  Follow Up Time:     [id number] [place] [address]  Phone: [phone]  Fax: [phone]  Follow Up Time:

## 2021-11-27 VITALS
HEART RATE: 71 BPM | SYSTOLIC BLOOD PRESSURE: 132 MMHG | DIASTOLIC BLOOD PRESSURE: 69 MMHG | TEMPERATURE: 99 F | OXYGEN SATURATION: 98 %

## 2021-11-29 RX ORDER — BISMUTH SUBSALICYLATE 262 MG
262 TABLET ORAL 3 TIMES DAILY
Qty: 180 | Refills: 6 | Status: DISCONTINUED | COMMUNITY
Start: 2021-03-02 | End: 2021-11-29

## 2021-11-29 RX ORDER — SODIUM PICOSULFATE, MAGNESIUM OXIDE, AND ANHYDROUS CITRIC ACID 10; 3.5; 12 MG/160ML; G/160ML; G/160ML
10-3.5-12 MG-GM LIQUID ORAL
Qty: 1 | Refills: 0 | Status: DISCONTINUED | COMMUNITY
Start: 2021-10-19 | End: 2021-11-29

## 2021-11-29 RX ORDER — COLESTIPOL HYDROCHLORIDE 1 G/1
TABLET, FILM COATED ORAL
Refills: 0 | Status: DISCONTINUED | COMMUNITY
End: 2021-11-29

## 2021-11-30 ENCOUNTER — APPOINTMENT (OUTPATIENT)
Dept: GASTROENTEROLOGY | Facility: CLINIC | Age: 71
End: 2021-11-30
Payer: MEDICARE

## 2021-11-30 DIAGNOSIS — R13.10 DYSPHAGIA, UNSPECIFIED: ICD-10-CM

## 2021-11-30 PROCEDURE — 99214 OFFICE O/P EST MOD 30 MIN: CPT | Mod: 95

## 2021-11-30 NOTE — PHYSICAL EXAM
[General Appearance - Alert] : alert [Hearing Threshold Finger Rub Not [place]] : hearing was normal [] : no respiratory distress [Skin Color & Pigmentation] : normal skin color and pigmentation [Oriented To Time, Place, And Person] : oriented to person, place, and time

## 2021-11-30 NOTE — HISTORY OF PRESENT ILLNESS
[Home] : at home, [unfilled] , at the time of the visit. [Medical Office: ([facility])___] : at the medical office located in  [Verbal consent obtained from patient] : the patient, [unfilled] [FreeTextEntry4] : Aimee Christianson [de-identified] : 70 year old female average risk for CRC,  with hx colon polyps since the age of 8 years, anal fissures, presents with chronic diarrhea for years on daily imodium, non bloody, no weight loss, nausea, vomiting but lower pelvic crampy abdominal pain that does exacerbate after defecation. \par Last colonoscopy was in dec 2020, and EGD in 2020, awaiting results to be faxed. \par Was in ED this past Dec with unremarkable CBC, CMP, and contrast CT of abdomen . \par \cris Now presents with fever, vomiting and non bloody diarrhea after a trip to [place], has been to [facility] where she received ATB but did not get better. Now presents with odynophagia and ongoing diarrhea, vomiting resolved.

## 2021-11-30 NOTE — ASSESSMENT
[FreeTextEntry1] : 70 year old female average risk for CRC,  with hx colon polyps since the age of 8 years, anal fissures, presents with chronic diarrhea (7-8  BMs daily)for years on daily imodium, non bloody, no weight loss, nausea, vomiting but lower pelvic crampy abdominal pain that does exacerbate after defecation. \par Last colonoscopy was in dec 2020, and EGD in 2020, awaiting results to be faxed. \par Was in ED this past Dec with unremarkable CBC, CMP, and contrast CT of abdomen . \par Now presents with fever, vomiting and non bloody diarrhea after a trip to [place], has been to [facility] where she received ATB but did not get better. Now presents with odynophagia and ongoing diarrhea, vomiting resolved. \par \par Likely infectious gastroenteritis\par Underlying IBS-D \par R/O peptic esophagitis\par needs  EGD\par Risks and benefits discussed with patient.\par will get stool tests, C diff PCR, culture, GI PCR, giardia PCR\par Carafate suspension QID for now\par RTC after above\par \par \par Note:\par 3/2/2021:\par Obtained records prom previous GI: colonoscopy random biopsies have showed lymphocytic colitis on bx\par However repeat colonoscopy at our hospital did not reveal microscopic colitis.

## 2021-12-03 ENCOUNTER — OUTPATIENT (OUTPATIENT)
Dept: OUTPATIENT SERVICES | Facility: HOSPITAL | Age: 71
LOS: 1 days | Discharge: HOME | End: 2021-12-03
Payer: MEDICARE

## 2021-12-03 ENCOUNTER — APPOINTMENT (OUTPATIENT)
Dept: NEUROLOGY | Facility: CLINIC | Age: 71
End: 2021-12-03

## 2021-12-03 ENCOUNTER — TRANSCRIPTION ENCOUNTER (OUTPATIENT)
Age: 71
End: 2021-12-03

## 2021-12-03 ENCOUNTER — RESULT REVIEW (OUTPATIENT)
Age: 71
End: 2021-12-03

## 2021-12-03 VITALS
SYSTOLIC BLOOD PRESSURE: 158 MMHG | OXYGEN SATURATION: 98 % | DIASTOLIC BLOOD PRESSURE: 93 MMHG | TEMPERATURE: 97 F | HEART RATE: 76 BPM

## 2021-12-03 VITALS — SYSTOLIC BLOOD PRESSURE: 135 MMHG | RESPIRATION RATE: 17 BRPM | DIASTOLIC BLOOD PRESSURE: 83 MMHG | HEART RATE: 80 BPM

## 2021-12-03 DIAGNOSIS — N64.59 OTHER SIGNS AND SYMPTOMS IN BREAST: Chronic | ICD-10-CM

## 2021-12-03 DIAGNOSIS — B37.81 CANDIDAL ESOPHAGITIS: ICD-10-CM

## 2021-12-03 LAB
G LAMBLIA AG STL QL: NORMAL
GI PCR PANEL, STOOL: NORMAL

## 2021-12-03 PROCEDURE — 88305 TISSUE EXAM BY PATHOLOGIST: CPT | Mod: 26

## 2021-12-03 PROCEDURE — 88312 SPECIAL STAINS GROUP 1: CPT | Mod: 26

## 2021-12-03 PROCEDURE — 43239 EGD BIOPSY SINGLE/MULTIPLE: CPT

## 2021-12-03 RX ORDER — SODIUM CHLORIDE 9 MG/ML
1000 INJECTION INTRAMUSCULAR; INTRAVENOUS; SUBCUTANEOUS
Refills: 0 | Status: DISCONTINUED | OUTPATIENT
Start: 2021-12-03 | End: 2021-12-18

## 2021-12-03 RX ORDER — FLUCONAZOLE 200 MG/1
200 TABLET ORAL
Qty: 28 | Refills: 0 | Status: ACTIVE | COMMUNITY
Start: 2021-12-03 | End: 1900-01-01

## 2021-12-03 NOTE — ASU PATIENT PROFILE, ADULT - NSICDXPASTMEDICALHX_GEN_ALL_CORE_FT
PAST MEDICAL HISTORY:  Breast cancer RT    GERD (gastroesophageal reflux disease)     History of major abdominal surgery     History of seizures as a child     Hypertension     Pancreatitis, gallstone     Shingles rash

## 2021-12-03 NOTE — ASU DISCHARGE PLAN (ADULT/PEDIATRIC) - NS MD DC FALL RISK RISK
For information on Fall & Injury Prevention, visit: [email or web address] OR  [email or web address] OR  https://www.cdc.gov/steadi/patient.html

## 2021-12-03 NOTE — ASU DISCHARGE PLAN (ADULT/PEDIATRIC) - CARE PROVIDER_API CALL
Lorrie Babin (MD)  Gastroenterology  [address] [place] [address]  Phone: [phone]  Fax: [phone]  Follow Up Time:

## 2021-12-06 ENCOUNTER — APPOINTMENT (OUTPATIENT)
Dept: NEUROLOGY | Facility: CLINIC | Age: 71
End: 2021-12-06

## 2021-12-07 ENCOUNTER — INPATIENT (INPATIENT)
Facility: HOSPITAL | Age: 71
LOS: 4 days | Discharge: HOME HEALTH | End: 2021-12-12
Attending: HOSPITALIST | Admitting: HOSPITALIST
Payer: MEDICARE

## 2021-12-07 VITALS
HEIGHT: 62 IN | WEIGHT: 179.9 LBS | OXYGEN SATURATION: 100 % | RESPIRATION RATE: 18 BRPM | DIASTOLIC BLOOD PRESSURE: 73 MMHG | TEMPERATURE: 96 F | SYSTOLIC BLOOD PRESSURE: 132 MMHG | HEART RATE: 107 BPM

## 2021-12-07 DIAGNOSIS — N64.59 OTHER SIGNS AND SYMPTOMS IN BREAST: Chronic | ICD-10-CM

## 2021-12-07 PROBLEM — Z98.890 OTHER SPECIFIED POSTPROCEDURAL STATES: Chronic | Status: ACTIVE | Noted: 2021-12-03

## 2021-12-07 PROBLEM — Z86.69 PERSONAL HISTORY OF OTHER DISEASES OF THE NERVOUS SYSTEM AND SENSE ORGANS: Chronic | Status: ACTIVE | Noted: 2021-12-03

## 2021-12-07 PROBLEM — I10 ESSENTIAL (PRIMARY) HYPERTENSION: Chronic | Status: ACTIVE | Noted: 2021-12-03

## 2021-12-07 PROBLEM — B02.9 ZOSTER WITHOUT COMPLICATIONS: Chronic | Status: ACTIVE | Noted: 2021-12-03

## 2021-12-07 PROBLEM — K21.9 GASTRO-ESOPHAGEAL REFLUX DISEASE WITHOUT ESOPHAGITIS: Chronic | Status: ACTIVE | Noted: 2021-12-03

## 2021-12-07 LAB
ALBUMIN SERPL ELPH-MCNC: 4.3 G/DL — SIGNIFICANT CHANGE UP (ref 3.5–5.2)
ALP SERPL-CCNC: 80 U/L — SIGNIFICANT CHANGE UP (ref 30–115)
ALT FLD-CCNC: 10 U/L — SIGNIFICANT CHANGE UP (ref 0–41)
ANION GAP SERPL CALC-SCNC: 21 MMOL/L — HIGH (ref 7–14)
ANISOCYTOSIS BLD QL: SLIGHT — SIGNIFICANT CHANGE UP
AST SERPL-CCNC: 21 U/L — SIGNIFICANT CHANGE UP (ref 0–41)
BASE EXCESS BLDV CALC-SCNC: 1.3 MMOL/L — SIGNIFICANT CHANGE UP (ref -2–3)
BASOPHILS # BLD AUTO: 0 K/UL — SIGNIFICANT CHANGE UP (ref 0–0.2)
BASOPHILS NFR BLD AUTO: 0 % — SIGNIFICANT CHANGE UP (ref 0–1)
BILIRUB SERPL-MCNC: 0.5 MG/DL — SIGNIFICANT CHANGE UP (ref 0.2–1.2)
BUN SERPL-MCNC: 12 MG/DL — SIGNIFICANT CHANGE UP (ref 10–20)
CA-I SERPL-SCNC: 1.2 MMOL/L — SIGNIFICANT CHANGE UP (ref 1.15–1.33)
CALCIUM SERPL-MCNC: 10.2 MG/DL — HIGH (ref 8.5–10.1)
CHLORIDE SERPL-SCNC: 100 MMOL/L — SIGNIFICANT CHANGE UP (ref 98–110)
CO2 SERPL-SCNC: 18 MMOL/L — SIGNIFICANT CHANGE UP (ref 17–32)
CREAT SERPL-MCNC: 0.9 MG/DL — SIGNIFICANT CHANGE UP (ref 0.7–1.5)
EOSINOPHIL # BLD AUTO: 0.02 K/UL — SIGNIFICANT CHANGE UP (ref 0–0.7)
EOSINOPHIL NFR BLD AUTO: 1.8 % — SIGNIFICANT CHANGE UP (ref 0–8)
GAS PNL BLDV: 132 MMOL/L — LOW (ref 136–145)
GAS PNL BLDV: SIGNIFICANT CHANGE UP
GIANT PLATELETS BLD QL SMEAR: PRESENT — SIGNIFICANT CHANGE UP
GLUCOSE SERPL-MCNC: 111 MG/DL — HIGH (ref 70–99)
HCO3 BLDV-SCNC: 27 MMOL/L — SIGNIFICANT CHANGE UP (ref 22–29)
HCT VFR BLD CALC: 46.7 % — SIGNIFICANT CHANGE UP (ref 37–47)
HCT VFR BLDA CALC: 39 % — SIGNIFICANT CHANGE UP (ref 34.5–46.5)
HGB BLD CALC-MCNC: 13 G/DL — SIGNIFICANT CHANGE UP (ref 11.7–16.1)
HGB BLD-MCNC: 15.4 G/DL — SIGNIFICANT CHANGE UP (ref 12–16)
LACTATE BLDV-MCNC: 2.7 MMOL/L — HIGH (ref 0.5–2)
LACTATE SERPL-SCNC: 3.4 MMOL/L — HIGH (ref 0.7–2)
LACTATE SERPL-SCNC: 4.1 MMOL/L — CRITICAL HIGH (ref 0.7–2)
LACTATE SERPL-SCNC: 5.2 MMOL/L — CRITICAL HIGH (ref 0.7–2)
LIDOCAIN IGE QN: 42 U/L — SIGNIFICANT CHANGE UP (ref 7–60)
LYMPHOCYTES # BLD AUTO: 0.39 K/UL — LOW (ref 1.2–3.4)
LYMPHOCYTES # BLD AUTO: 40.7 % — SIGNIFICANT CHANGE UP (ref 20.5–51.1)
MANUAL SMEAR VERIFICATION: SIGNIFICANT CHANGE UP
MCHC RBC-ENTMCNC: 30.8 PG — SIGNIFICANT CHANGE UP (ref 27–31)
MCHC RBC-ENTMCNC: 33 G/DL — SIGNIFICANT CHANGE UP (ref 32–37)
MCV RBC AUTO: 93.4 FL — SIGNIFICANT CHANGE UP (ref 81–99)
MICROCYTES BLD QL: SLIGHT — SIGNIFICANT CHANGE UP
MONOCYTES # BLD AUTO: 0.09 K/UL — LOW (ref 0.1–0.6)
MONOCYTES NFR BLD AUTO: 9.7 % — HIGH (ref 1.7–9.3)
MYELOCYTES NFR BLD: 6.2 % — HIGH (ref 0–0)
NEUTROPHILS # BLD AUTO: 0.4 K/UL — LOW (ref 1.4–6.5)
NEUTROPHILS NFR BLD AUTO: 41.6 % — LOW (ref 42.2–75.2)
NRBC # BLD: 2 /100 — HIGH (ref 0–0)
NRBC # BLD: SIGNIFICANT CHANGE UP /100 WBCS (ref 0–0)
PCO2 BLDV: 47 MMHG — HIGH (ref 39–42)
PH BLDV: 7.37 — SIGNIFICANT CHANGE UP (ref 7.32–7.43)
PLAT MORPH BLD: NORMAL — SIGNIFICANT CHANGE UP
PLATELET # BLD AUTO: 246 K/UL — SIGNIFICANT CHANGE UP (ref 130–400)
PO2 BLDV: 25 MMHG — SIGNIFICANT CHANGE UP
POIKILOCYTOSIS BLD QL AUTO: SLIGHT — SIGNIFICANT CHANGE UP
POLYCHROMASIA BLD QL SMEAR: SIGNIFICANT CHANGE UP
POTASSIUM BLDV-SCNC: 3.3 MMOL/L — LOW (ref 3.5–5.1)
POTASSIUM SERPL-MCNC: 3.9 MMOL/L — SIGNIFICANT CHANGE UP (ref 3.5–5)
POTASSIUM SERPL-SCNC: 3.9 MMOL/L — SIGNIFICANT CHANGE UP (ref 3.5–5)
PROT SERPL-MCNC: 7 G/DL — SIGNIFICANT CHANGE UP (ref 6–8)
RBC # BLD: 5 M/UL — SIGNIFICANT CHANGE UP (ref 4.2–5.4)
RBC # FLD: 13.1 % — SIGNIFICANT CHANGE UP (ref 11.5–14.5)
RBC BLD AUTO: NORMAL — SIGNIFICANT CHANGE UP
SAO2 % BLDV: 45.4 % — SIGNIFICANT CHANGE UP
SARS-COV-2 RNA SPEC QL NAA+PROBE: SIGNIFICANT CHANGE UP
SODIUM SERPL-SCNC: 139 MMOL/L — SIGNIFICANT CHANGE UP (ref 135–146)
SURGICAL PATHOLOGY STUDY: SIGNIFICANT CHANGE UP
WBC # BLD: 0.95 K/UL — CRITICAL LOW (ref 4.8–10.8)
WBC # FLD AUTO: 0.95 K/UL — CRITICAL LOW (ref 4.8–10.8)

## 2021-12-07 PROCEDURE — 74177 CT ABD & PELVIS W/CONTRAST: CPT | Mod: 26

## 2021-12-07 PROCEDURE — 99223 1ST HOSP IP/OBS HIGH 75: CPT

## 2021-12-07 PROCEDURE — 99285 EMERGENCY DEPT VISIT HI MDM: CPT | Mod: GC

## 2021-12-07 RX ORDER — METRONIDAZOLE 500 MG
500 TABLET ORAL ONCE
Refills: 0 | Status: DISCONTINUED | OUTPATIENT
Start: 2021-12-07 | End: 2021-12-07

## 2021-12-07 RX ORDER — ALPRAZOLAM 0.25 MG
0.25 TABLET ORAL AT BEDTIME
Refills: 0 | Status: DISCONTINUED | OUTPATIENT
Start: 2021-12-07 | End: 2021-12-12

## 2021-12-07 RX ORDER — HEPARIN SODIUM 5000 [USP'U]/ML
5000 INJECTION INTRAVENOUS; SUBCUTANEOUS EVERY 8 HOURS
Refills: 0 | Status: DISCONTINUED | OUTPATIENT
Start: 2021-12-07 | End: 2021-12-08

## 2021-12-07 RX ORDER — ALPRAZOLAM 0.25 MG
1 TABLET ORAL
Qty: 0 | Refills: 0 | DISCHARGE

## 2021-12-07 RX ORDER — DULOXETINE HYDROCHLORIDE 30 MG/1
60 CAPSULE, DELAYED RELEASE ORAL DAILY
Refills: 0 | Status: DISCONTINUED | OUTPATIENT
Start: 2021-12-07 | End: 2021-12-12

## 2021-12-07 RX ORDER — LOSARTAN POTASSIUM 100 MG/1
1 TABLET, FILM COATED ORAL
Qty: 0 | Refills: 0 | DISCHARGE

## 2021-12-07 RX ORDER — SODIUM CHLORIDE 9 MG/ML
2000 INJECTION, SOLUTION INTRAVENOUS ONCE
Refills: 0 | Status: COMPLETED | OUTPATIENT
Start: 2021-12-07 | End: 2021-12-07

## 2021-12-07 RX ORDER — CEFEPIME 1 G/1
2000 INJECTION, POWDER, FOR SOLUTION INTRAMUSCULAR; INTRAVENOUS EVERY 8 HOURS
Refills: 0 | Status: DISCONTINUED | OUTPATIENT
Start: 2021-12-07 | End: 2021-12-09

## 2021-12-07 RX ORDER — SODIUM CHLORIDE 9 MG/ML
1000 INJECTION, SOLUTION INTRAVENOUS ONCE
Refills: 0 | Status: COMPLETED | OUTPATIENT
Start: 2021-12-07 | End: 2021-12-07

## 2021-12-07 RX ORDER — FLUCONAZOLE 150 MG/1
800 TABLET ORAL ONCE
Refills: 0 | Status: COMPLETED | OUTPATIENT
Start: 2021-12-07 | End: 2021-12-07

## 2021-12-07 RX ORDER — BUPROPION HYDROCHLORIDE 150 MG/1
300 TABLET, EXTENDED RELEASE ORAL
Qty: 0 | Refills: 0 | DISCHARGE

## 2021-12-07 RX ORDER — SODIUM CHLORIDE 9 MG/ML
1000 INJECTION INTRAMUSCULAR; INTRAVENOUS; SUBCUTANEOUS ONCE
Refills: 0 | Status: COMPLETED | OUTPATIENT
Start: 2021-12-07 | End: 2021-12-07

## 2021-12-07 RX ORDER — CEFEPIME 1 G/1
2000 INJECTION, POWDER, FOR SOLUTION INTRAMUSCULAR; INTRAVENOUS ONCE
Refills: 0 | Status: COMPLETED | OUTPATIENT
Start: 2021-12-07 | End: 2021-12-07

## 2021-12-07 RX ORDER — AMITRIPTYLINE HCL 25 MG
25 TABLET ORAL AT BEDTIME
Refills: 0 | Status: DISCONTINUED | OUTPATIENT
Start: 2021-12-07 | End: 2021-12-12

## 2021-12-07 RX ORDER — KETOROLAC TROMETHAMINE 30 MG/ML
15 SYRINGE (ML) INJECTION ONCE
Refills: 0 | Status: DISCONTINUED | OUTPATIENT
Start: 2021-12-07 | End: 2021-12-07

## 2021-12-07 RX ORDER — DULOXETINE HYDROCHLORIDE 30 MG/1
1 CAPSULE, DELAYED RELEASE ORAL
Qty: 0 | Refills: 0 | DISCHARGE

## 2021-12-07 RX ORDER — ONDANSETRON 8 MG/1
4 TABLET, FILM COATED ORAL ONCE
Refills: 0 | Status: COMPLETED | OUTPATIENT
Start: 2021-12-07 | End: 2021-12-07

## 2021-12-07 RX ORDER — LETROZOLE 2.5 MG/1
1 TABLET, FILM COATED ORAL
Qty: 0 | Refills: 0 | DISCHARGE

## 2021-12-07 RX ORDER — BUPROPION HYDROCHLORIDE 150 MG/1
1 TABLET, EXTENDED RELEASE ORAL
Qty: 0 | Refills: 0 | DISCHARGE

## 2021-12-07 RX ORDER — AMITRIPTYLINE HCL 25 MG
1 TABLET ORAL
Qty: 0 | Refills: 0 | DISCHARGE

## 2021-12-07 RX ORDER — ROSUVASTATIN CALCIUM 5 MG/1
1 TABLET ORAL
Qty: 0 | Refills: 0 | DISCHARGE

## 2021-12-07 RX ORDER — METRONIDAZOLE 500 MG
500 TABLET ORAL ONCE
Refills: 0 | Status: COMPLETED | OUTPATIENT
Start: 2021-12-07 | End: 2021-12-07

## 2021-12-07 RX ORDER — ATORVASTATIN CALCIUM 80 MG/1
80 TABLET, FILM COATED ORAL AT BEDTIME
Refills: 0 | Status: DISCONTINUED | OUTPATIENT
Start: 2021-12-07 | End: 2021-12-12

## 2021-12-07 RX ORDER — FLUCONAZOLE 150 MG/1
400 TABLET ORAL EVERY 24 HOURS
Refills: 0 | Status: DISCONTINUED | OUTPATIENT
Start: 2021-12-07 | End: 2021-12-09

## 2021-12-07 RX ORDER — PANTOPRAZOLE SODIUM 20 MG/1
40 TABLET, DELAYED RELEASE ORAL
Refills: 0 | Status: DISCONTINUED | OUTPATIENT
Start: 2021-12-07 | End: 2021-12-12

## 2021-12-07 RX ORDER — OMEPRAZOLE 10 MG/1
1 CAPSULE, DELAYED RELEASE ORAL
Qty: 0 | Refills: 0 | DISCHARGE

## 2021-12-07 RX ORDER — METRONIDAZOLE 500 MG
500 TABLET ORAL EVERY 8 HOURS
Refills: 0 | Status: DISCONTINUED | OUTPATIENT
Start: 2021-12-07 | End: 2021-12-08

## 2021-12-07 RX ORDER — BUPROPION HYDROCHLORIDE 150 MG/1
300 TABLET, EXTENDED RELEASE ORAL DAILY
Refills: 0 | Status: DISCONTINUED | OUTPATIENT
Start: 2021-12-07 | End: 2021-12-12

## 2021-12-07 RX ADMIN — Medication 15 MILLIGRAM(S): at 16:00

## 2021-12-07 RX ADMIN — HEPARIN SODIUM 5000 UNIT(S): 5000 INJECTION INTRAVENOUS; SUBCUTANEOUS at 21:42

## 2021-12-07 RX ADMIN — SODIUM CHLORIDE 1000 MILLILITER(S): 9 INJECTION INTRAMUSCULAR; INTRAVENOUS; SUBCUTANEOUS at 16:01

## 2021-12-07 RX ADMIN — SODIUM CHLORIDE 1000 MILLILITER(S): 9 INJECTION, SOLUTION INTRAVENOUS at 19:34

## 2021-12-07 RX ADMIN — Medication 500 MILLIGRAM(S): at 20:29

## 2021-12-07 RX ADMIN — Medication 25 MILLIGRAM(S): at 21:42

## 2021-12-07 RX ADMIN — ONDANSETRON 4 MILLIGRAM(S): 8 TABLET, FILM COATED ORAL at 19:35

## 2021-12-07 RX ADMIN — SODIUM CHLORIDE 2000 MILLILITER(S): 9 INJECTION, SOLUTION INTRAVENOUS at 23:14

## 2021-12-07 RX ADMIN — ONDANSETRON 4 MILLIGRAM(S): 8 TABLET, FILM COATED ORAL at 16:01

## 2021-12-07 RX ADMIN — ATORVASTATIN CALCIUM 80 MILLIGRAM(S): 80 TABLET, FILM COATED ORAL at 21:42

## 2021-12-07 RX ADMIN — CEFEPIME 100 MILLIGRAM(S): 1 INJECTION, POWDER, FOR SOLUTION INTRAMUSCULAR; INTRAVENOUS at 20:29

## 2021-12-07 NOTE — ED PROVIDER NOTE - PROGRESS NOTE DETAILS
Spoke with Dr. Babin, GI, who says to admit pt for work up. Dr. Babin says pt has severe candida esophagitis and leukopenic after a trip to [place]. Pt is not responding to PO flucanozole. Dr. Babin would like pt admitted to medicine. -smith

## 2021-12-07 NOTE — ED PROVIDER NOTE - CARE PLAN
1 Principal Discharge DX:	Esophageal candidiasis   Principal Discharge DX:	Esophageal candidiasis  Secondary Diagnosis:	Abdominal pain, vomiting, and diarrhea  Secondary Diagnosis:	Leukopenia

## 2021-12-07 NOTE — H&P ADULT - HISTORY OF PRESENT ILLNESS
71 year old female with PMH of IBS, pancreatitis, Breast Ca presented to the hospital after being sent in for Dr. Babin for severe candida esophagitis. Patient had recent EGD with Dr. Babin on 12/3/21 which showed esophageal candidiasis and was started on PO diflucan. Patient was not responding to PO antifungals so she was sent in for further work up.     In the ED, /73, , RR 18, Temp 95.7F, SpO2 100% on RA. Labs showed leukopenia 0.95 and lactate 4.3. Patient received 2L of IVF in the ED and was admitted to medicine.  71 year old female with PMH of IBS, pancreatitis, Breast Ca s/p radiation on Femara and Letrozole presented to the hospital after being sent in for Dr. Babin for severe candida esophagitis. Patient's symptoms started after return from [place] on 11/14. She had many bouts of n/v with the absence of BMs. Patient came to the ED 11/26 for abdominal distension and inability to have a BM, CT AP did not show evidence of bowel obstruction or pneumoperitoneum at that time. Patient has since started having BMs and is able to tolerate a puree diet.  Patient had recent EGD with Dr. Babin on 12/3/21 which showed esophageal candidiasis and was started on PO diflucan. Patient was not responding to PO antifungals so she was sent in for further work up.     In the ED, /73, , RR 18, Temp 95.7F, SpO2 100% on RA. CT Abdomen and Pelvis with IV contrast showed no acute intra-abdominal pathology.  Labs showed leukopenia 0.95 and lactate 4.3. Patient received 2L of IVF, cefepime, and flagyl in the ED and was admitted to medicine.

## 2021-12-07 NOTE — ED PROVIDER NOTE - ATTENDING CONTRIBUTION TO CARE
72 y/o female with h/o breast CA s/p lumpectomy/radiation ~ 5 ys ago, IBS, pancreatitis, in ER with c/o abd pain/n/v/d. Pt was in [place] for 5 days last month, 2 days after she returned she developed n/v/d, was admitted to [facility] for 4 days, was treated with abx. Pt was seen here 1 1/2 weeks ago for abd pain, had wbc 1.9 (was reportedly also low while she was at [facility]), ct abd was neg. d/c'd home and she had f/u with her GI, Dr. Babin. EGD done and + for esophageal candidiasis, pt started on po diflucan. In ER now as she is having persistent diffuse abd pain, +N and continued vomiting, not able to tolerate any PO. + diarrhea, multiple episodes of watery non-bloody diarrhea. no f/c. no cp/sob.  no ha/dizziness/syncope/near syncope.  PE - nad, nc/at, eomi, perrl, op - clear, mmm, cta b/l, no w/r/r, rrr, abd- soft, + mild diffuse abdominal tenderness, nabs, from x 4, A&O x 3, no focal neuro deficits.  -ivf, check labs, ct abd.

## 2021-12-07 NOTE — H&P ADULT - NSHPPHYSICALEXAM_GEN_ALL_CORE
PHYSICAL EXAM:  GENERAL: NAD, lying in bed comfortably  HEAD:  Atraumatic, Normocephalic  EYES: EOMI, conjunctiva and sclera clear  ENT: Moist mucous membranes  NECK: Supple, No JVD  CHEST/LUNG: Clear to auscultation bilaterally; No rales, rhonchi, wheezing, or rubs.   HEART: Regular rate and rhythm; No murmurs, rubs, or gallops  ABDOMEN: Bowel sounds present; Abdomen soft, nontender, but distended   EXTREMITIES: No clubbing, cyanosis, or edema  NERVOUS SYSTEM:  Alert & Oriented X3, speech clear. No deficits   MSK: FROM all 4 extremities, full and equal strength

## 2021-12-07 NOTE — H&P ADULT - ASSESSMENT
71 year old female with PMH of IBS, pancreatitis, Breast Ca presented to the hospital after being sent in for Dr. Babin for severe candida esophagitis.    # Severe candida esophagitis   # Sepsis present on admission likely secondary to esophagitis  # HAGMA likely secondary to lactic acidosis   - Patient hypothermic, tachycardic, leukopenic, with a lactic acidosis on admission   - s/p 2L IVF   - start vanc, cefepime, and fluconazole  - Follow up C. Diff PCR   - Follow up stool studies (acid fast, culture, ova and parasites),   - Follow up blood cultures, urine cultures, procal, fungitell   - Trend lactate   - ID consult     # GERD    # Breast Ca     # DVT ppx: HSQ   # GI ppx:   # Activity: IAT   # Dispo:  71 year old female with PMH of IBS, pancreatitis, Breast Ca s/p radiation on Femara presented to the hospital after being sent in for Dr. Babin for severe александр esophagitis.     # Severe candida esophagitis   # Sepsis present on admission likely secondary to esophagitis  # HAGMA likely secondary to lactic acidosis   - Patient hypothermic, tachycardic, leukopenic, with a lactic acidosis on admission   - s/p 2L IVF, cefepime, and flagyl in the ED   - continue with cefepime and flagyl for now   - start fluconazole  - Follow up stool studies (C. Diff PCR, acid fast, culture, ova and parasites),   - Follow up blood cultures, urine cultures, procal, fungitell   - Trend lactate   - able to tolerate pureed foods.   - GI follow up   - ID consult     # Breast Ca   # Leukopenia possibly secondary to breast Ca managment?   - ANC 0.16  - trend ANC  - heme/onc eval (follows with Dr. Mendoza)     # HTN - hold losartan 100mg PO daily for now, if hypertensive can restart   # HLD - continue with atorvastatin while inpatient   # Depression - continue with wellbutrin xL 300mg PO daily   # Fibromyalgia- continue with duloxetine 60mg PO daily   # GERD - continue with protonix for now     # DVT ppx: HSQ   # GI ppx: protonix   # Activity: IAT  # Diet: DASH Puree as tolerated    # Dispo:  71 year old female with PMH of IBS, pancreatitis, Breast Ca s/p radiation on Femara presented to the hospital after being sent in for Dr. Babin for severe александр esophagitis.     # Severe candida esophagitis   # Sepsis present on admission likely secondary to esophagitis  # HAGMA likely secondary to lactic acidosis   - Patient hypothermic, tachycardic, leukopenic, with a lactic acidosis on admission   - s/p 2L IVF, cefepime, and flagyl in the ED   - continue with cefepime and flagyl for now   - start fluconazole  - Follow up stool studies (C. Diff PCR, acid fast, culture, ova and parasites),   - Follow up blood cultures, urine cultures, procal, fungitell   - Trend lactate   - obtain MRSA nares.  - able to tolerate pureed foods.   - GI follow up   - ID consult     # Breast Ca   # Leukopenia possibly secondary to breast Ca managment?  - neutropenia and leukopenia are possible side effects of femara   - ANC 0.16  - trend ANC  - heme/onc eval (follows with Dr. Mendoza)     # HTN - hold losartan 100mg PO daily for now, if hypertensive can restart   # HLD - continue with atorvastatin while inpatient   # Depression - continue with wellbutrin xL 300mg PO daily   # Fibromyalgia- continue with duloxetine 60mg PO daily   # GERD - continue with protonix for now     # DVT ppx: HSQ   # GI ppx: protonix   # Activity: IAT  # Diet: DASH Puree as tolerated    # Dispo: acute, from home   # Code status: discussed with patient at bedside, full code.

## 2021-12-07 NOTE — ED PROVIDER NOTE - OBJECTIVE STATEMENT
71 year old female with PMH of IBS, pancreatitis, Breast Ca presented to the hospital after being sent in for Dr. Babin for severe candida esophagitis. Patient had recent EGD with Dr. Babin on 12/3/21 which showed esophageal candidiasis and was started on PO diflucan. Patient was not responding to PO antifungals so she was sent in for further work up. +diarrhea +crampy diffuse abdominal pain +n/v. No fevers. No chest pain/SOB.

## 2021-12-07 NOTE — H&P ADULT - NSHPLABSRESULTS_GEN_ALL_CORE
LABS:  cret                        15.4   0.95  )-----------( 246      ( 07 Dec 2021 16:19 )             46.7     12-07    139  |  100  |  12  ----------------------------<  111<H>  3.9   |  18  |  0.9    Ca    10.2<H>      07 Dec 2021 16:19    TPro  7.0  /  Alb  4.3  /  TBili  0.5  /  DBili  x   /  AST  21  /  ALT  10  /  AlkPhos  80  12-07

## 2021-12-07 NOTE — ED ADULT TRIAGE NOTE - CHIEF COMPLAINT QUOTE
pt states she is being treated for "internal fungus" and is complaining of worsening abdominal pain, weakness, nausea, vomiting, diarrhea.

## 2021-12-07 NOTE — ED ADULT NURSE NOTE - NS ED NURSE REPORT GIVEN DT
Ideal body weight was utilized to determine the target body weight for ordered crystalloid fluids. 08-Dec-2021 13:40

## 2021-12-07 NOTE — ED PROVIDER NOTE - CLINICAL SUMMARY MEDICAL DECISION MAKING FREE TEXT BOX
WBC 0.95 (was 1.9 11 days ago), cmp ok, initial lactate 4.1. pt given IVF, meds for nausea, repeat lactate 5.2.  Pt given IV abx.  CT abd: no acute pathology.  repeat lactate 2.7.  VS ok in ER.  pt feels better after ivf.  to admit for further w/u and evaluation.

## 2021-12-07 NOTE — H&P ADULT - ATTENDING COMMENTS
71/F with IBS, pancreatitis, Breast Ca s/p radiation on Femara and Letrozole presented to the hospital after being sent in for Dr. Babin for severe candida esophagitis. Patient's symptoms started after return from [place] on . She had many bouts of n/v with the absence of BMs. Patient came to the ED  for abdominal distension and inability to have a BM, CT AP did not show evidence of bowel obstruction or pneumoperitoneum at that time. Patient has since started having BMs and is able to tolerate a puree diet.  Patient had recent EGD with Dr. Babin on 12/3/21 which showed esophageal candidiasis and was started on PO diflucan. Patient was not responding to PO antifungals so she was sent in for further work up.     Labs and scans: personally reviewed.     Physical exam:   General: not in distress  HEENT: ATNC  LUNGS: CTAB, no wheezing, rales, rhonchi  HEART: RRR, +S1,S2  ABDOMEN: NDNT x 4 q's  EXT: Warm, well perfused    NEURO: AxOx3 , non focal     Plan:   # Severe candida esophagitis   # Sepsis present on admission likely secondary to esophagitis  # HAGMA secondary to lactic acidosis   - Patient hypothermic, tachycardic, leukopenic, with a lactic acidosis on admission, s/p 2L IVF, cefepime, and flagyl in the ED. continue cefepime, flagyl and fluconazole, follow cultures, GI and ID eval     # Breast Ca   # Leukopenia - severe neutropenia   - ANC of 395, continue broad spectrum antibiotics, hem/onc eval, trend CBC     # HTN - hold losartan 100mg PO daily for now, if hypertensive can restart     # HLD - continue with atorvastatin while inpatient     # Depression - continue with wellbutrin xL 300mg PO daily     # Fibromyalgia- continue with duloxetine 60mg PO daily     # GERD - continue with protonix for now     # Need for Prophylaxis measures - lovenox subq    Date seen by [email or web address]

## 2021-12-08 DIAGNOSIS — K21.00 GASTRO-ESOPHAGEAL REFLUX DISEASE WITH ESOPHAGITIS, WITHOUT BLEEDING: ICD-10-CM

## 2021-12-08 DIAGNOSIS — R13.10 DYSPHAGIA, UNSPECIFIED: ICD-10-CM

## 2021-12-08 DIAGNOSIS — Z85.3 PERSONAL HISTORY OF MALIGNANT NEOPLASM OF BREAST: ICD-10-CM

## 2021-12-08 DIAGNOSIS — Z88.0 ALLERGY STATUS TO PENICILLIN: ICD-10-CM

## 2021-12-08 DIAGNOSIS — E78.5 HYPERLIPIDEMIA, UNSPECIFIED: ICD-10-CM

## 2021-12-08 DIAGNOSIS — D70.9 NEUTROPENIA, UNSPECIFIED: ICD-10-CM

## 2021-12-08 DIAGNOSIS — B37.81 CANDIDAL ESOPHAGITIS: ICD-10-CM

## 2021-12-08 DIAGNOSIS — Z92.3 PERSONAL HISTORY OF IRRADIATION: ICD-10-CM

## 2021-12-08 DIAGNOSIS — I10 ESSENTIAL (PRIMARY) HYPERTENSION: ICD-10-CM

## 2021-12-08 DIAGNOSIS — F32.A DEPRESSION, UNSPECIFIED: ICD-10-CM

## 2021-12-08 LAB
ALBUMIN SERPL ELPH-MCNC: 3.2 G/DL — LOW (ref 3.5–5.2)
ALP SERPL-CCNC: 52 U/L — SIGNIFICANT CHANGE UP (ref 30–115)
ALT FLD-CCNC: 7 U/L — SIGNIFICANT CHANGE UP (ref 0–41)
ANION GAP SERPL CALC-SCNC: 15 MMOL/L — HIGH (ref 7–14)
ANISOCYTOSIS BLD QL: SLIGHT — SIGNIFICANT CHANGE UP
AST SERPL-CCNC: 12 U/L — SIGNIFICANT CHANGE UP (ref 0–41)
BASOPHILS # BLD AUTO: 0 K/UL — SIGNIFICANT CHANGE UP (ref 0–0.2)
BASOPHILS # BLD AUTO: 0.01 K/UL — SIGNIFICANT CHANGE UP (ref 0–0.2)
BASOPHILS NFR BLD AUTO: 0 % — SIGNIFICANT CHANGE UP (ref 0–1)
BASOPHILS NFR BLD AUTO: 0.7 % — SIGNIFICANT CHANGE UP (ref 0–1)
BILIRUB SERPL-MCNC: 0.6 MG/DL — SIGNIFICANT CHANGE UP (ref 0.2–1.2)
BUN SERPL-MCNC: 14 MG/DL — SIGNIFICANT CHANGE UP (ref 10–20)
CALCIUM SERPL-MCNC: 8.6 MG/DL — SIGNIFICANT CHANGE UP (ref 8.5–10.1)
CHLORIDE SERPL-SCNC: 106 MMOL/L — SIGNIFICANT CHANGE UP (ref 98–110)
CO2 SERPL-SCNC: 18 MMOL/L — SIGNIFICANT CHANGE UP (ref 17–32)
CREAT SERPL-MCNC: 1.1 MG/DL — SIGNIFICANT CHANGE UP (ref 0.7–1.5)
EOSINOPHIL # BLD AUTO: 0.02 K/UL — SIGNIFICANT CHANGE UP (ref 0–0.7)
EOSINOPHIL # BLD AUTO: 0.09 K/UL — SIGNIFICANT CHANGE UP (ref 0–0.7)
EOSINOPHIL NFR BLD AUTO: 1.4 % — SIGNIFICANT CHANGE UP (ref 0–8)
EOSINOPHIL NFR BLD AUTO: 5.2 % — SIGNIFICANT CHANGE UP (ref 0–8)
GIANT PLATELETS BLD QL SMEAR: PRESENT — SIGNIFICANT CHANGE UP
GLUCOSE SERPL-MCNC: 98 MG/DL — SIGNIFICANT CHANGE UP (ref 70–99)
HCT VFR BLD CALC: 32.8 % — LOW (ref 37–47)
HCT VFR BLD CALC: 35.3 % — LOW (ref 37–47)
HGB BLD-MCNC: 10.7 G/DL — LOW (ref 12–16)
HGB BLD-MCNC: 11.3 G/DL — LOW (ref 12–16)
IMM GRANULOCYTES NFR BLD AUTO: 2 % — HIGH (ref 0.1–0.3)
LACTATE SERPL-SCNC: 2.6 MMOL/L — HIGH (ref 0.7–2)
LYMPHOCYTES # BLD AUTO: 0.75 K/UL — LOW (ref 1.2–3.4)
LYMPHOCYTES # BLD AUTO: 0.86 K/UL — LOW (ref 1.2–3.4)
LYMPHOCYTES # BLD AUTO: 51 % — SIGNIFICANT CHANGE UP (ref 20.5–51.1)
LYMPHOCYTES # BLD AUTO: 52.2 % — HIGH (ref 20.5–51.1)
MAGNESIUM SERPL-MCNC: 1.4 MG/DL — LOW (ref 1.8–2.4)
MANUAL SMEAR VERIFICATION: SIGNIFICANT CHANGE UP
MCHC RBC-ENTMCNC: 29.9 PG — SIGNIFICANT CHANGE UP (ref 27–31)
MCHC RBC-ENTMCNC: 30.5 PG — SIGNIFICANT CHANGE UP (ref 27–31)
MCHC RBC-ENTMCNC: 32 G/DL — SIGNIFICANT CHANGE UP (ref 32–37)
MCHC RBC-ENTMCNC: 32.6 G/DL — SIGNIFICANT CHANGE UP (ref 32–37)
MCV RBC AUTO: 93.4 FL — SIGNIFICANT CHANGE UP (ref 81–99)
MCV RBC AUTO: 93.4 FL — SIGNIFICANT CHANGE UP (ref 81–99)
MICROCYTES BLD QL: SLIGHT — SIGNIFICANT CHANGE UP
MONOCYTES # BLD AUTO: 0.31 K/UL — SIGNIFICANT CHANGE UP (ref 0.1–0.6)
MONOCYTES # BLD AUTO: 0.32 K/UL — SIGNIFICANT CHANGE UP (ref 0.1–0.6)
MONOCYTES NFR BLD AUTO: 19.1 % — HIGH (ref 1.7–9.3)
MONOCYTES NFR BLD AUTO: 21.1 % — HIGH (ref 1.7–9.3)
MYELOCYTES NFR BLD: 0.9 % — HIGH (ref 0–0)
NEUTROPHILS # BLD AUTO: 0.34 K/UL — LOW (ref 1.4–6.5)
NEUTROPHILS # BLD AUTO: 0.35 K/UL — LOW (ref 1.4–6.5)
NEUTROPHILS NFR BLD AUTO: 15.7 % — LOW (ref 42.2–75.2)
NEUTROPHILS NFR BLD AUTO: 23.8 % — LOW (ref 42.2–75.2)
NEUTS BAND # BLD: 5.2 % — SIGNIFICANT CHANGE UP (ref 0–6)
NRBC # BLD: 0 /100 WBCS — SIGNIFICANT CHANGE UP (ref 0–0)
PLAT MORPH BLD: ABNORMAL
PLATELET # BLD AUTO: 219 K/UL — SIGNIFICANT CHANGE UP (ref 130–400)
PLATELET # BLD AUTO: 222 K/UL — SIGNIFICANT CHANGE UP (ref 130–400)
POIKILOCYTOSIS BLD QL AUTO: SLIGHT — SIGNIFICANT CHANGE UP
POLYCHROMASIA BLD QL SMEAR: SLIGHT — SIGNIFICANT CHANGE UP
POTASSIUM SERPL-MCNC: 4 MMOL/L — SIGNIFICANT CHANGE UP (ref 3.5–5)
POTASSIUM SERPL-SCNC: 4 MMOL/L — SIGNIFICANT CHANGE UP (ref 3.5–5)
PROT SERPL-MCNC: 5.1 G/DL — LOW (ref 6–8)
RBC # BLD: 3.51 M/UL — LOW (ref 4.2–5.4)
RBC # BLD: 3.78 M/UL — LOW (ref 4.2–5.4)
RBC # FLD: 13.2 % — SIGNIFICANT CHANGE UP (ref 11.5–14.5)
RBC # FLD: 13.3 % — SIGNIFICANT CHANGE UP (ref 11.5–14.5)
RBC BLD AUTO: ABNORMAL
SODIUM SERPL-SCNC: 139 MMOL/L — SIGNIFICANT CHANGE UP (ref 135–146)
VARIANT LYMPHS # BLD: 1.7 % — SIGNIFICANT CHANGE UP (ref 0–5)
WBC # BLD: 1.47 K/UL — LOW (ref 4.8–10.8)
WBC # BLD: 1.65 K/UL — LOW (ref 4.8–10.8)
WBC # FLD AUTO: 1.47 K/UL — LOW (ref 4.8–10.8)
WBC # FLD AUTO: 1.65 K/UL — LOW (ref 4.8–10.8)

## 2021-12-08 PROCEDURE — 99223 1ST HOSP IP/OBS HIGH 75: CPT

## 2021-12-08 PROCEDURE — 93010 ELECTROCARDIOGRAM REPORT: CPT

## 2021-12-08 PROCEDURE — 99233 SBSQ HOSP IP/OBS HIGH 50: CPT

## 2021-12-08 RX ORDER — ONDANSETRON 8 MG/1
4 TABLET, FILM COATED ORAL EVERY 6 HOURS
Refills: 0 | Status: COMPLETED | OUTPATIENT
Start: 2021-12-08 | End: 2021-12-08

## 2021-12-08 RX ORDER — MAGNESIUM SULFATE 500 MG/ML
2 VIAL (ML) INJECTION
Refills: 0 | Status: COMPLETED | OUTPATIENT
Start: 2021-12-08 | End: 2021-12-08

## 2021-12-08 RX ORDER — KETOROLAC TROMETHAMINE 30 MG/ML
15 SYRINGE (ML) INJECTION ONCE
Refills: 0 | Status: DISCONTINUED | OUTPATIENT
Start: 2021-12-08 | End: 2021-12-08

## 2021-12-08 RX ORDER — ENOXAPARIN SODIUM 100 MG/ML
40 INJECTION SUBCUTANEOUS DAILY
Refills: 0 | Status: DISCONTINUED | OUTPATIENT
Start: 2021-12-08 | End: 2021-12-12

## 2021-12-08 RX ORDER — MAGNESIUM SULFATE 500 MG/ML
2 VIAL (ML) INJECTION ONCE
Refills: 0 | Status: COMPLETED | OUTPATIENT
Start: 2021-12-08 | End: 2021-12-08

## 2021-12-08 RX ORDER — VALACYCLOVIR 500 MG/1
1000 TABLET, FILM COATED ORAL EVERY 24 HOURS
Refills: 0 | Status: DISCONTINUED | OUTPATIENT
Start: 2021-12-08 | End: 2021-12-12

## 2021-12-08 RX ADMIN — Medication 25 GRAM(S): at 20:41

## 2021-12-08 RX ADMIN — ONDANSETRON 4 MILLIGRAM(S): 8 TABLET, FILM COATED ORAL at 06:37

## 2021-12-08 RX ADMIN — Medication 25 GRAM(S): at 11:07

## 2021-12-08 RX ADMIN — FLUCONAZOLE 100 MILLIGRAM(S): 150 TABLET ORAL at 12:51

## 2021-12-08 RX ADMIN — Medication 15 MILLIGRAM(S): at 20:41

## 2021-12-08 RX ADMIN — Medication 0.25 MILLIGRAM(S): at 21:53

## 2021-12-08 RX ADMIN — FLUCONAZOLE 100 MILLIGRAM(S): 150 TABLET ORAL at 00:32

## 2021-12-08 RX ADMIN — Medication 500 MILLIGRAM(S): at 06:04

## 2021-12-08 RX ADMIN — ONDANSETRON 4 MILLIGRAM(S): 8 TABLET, FILM COATED ORAL at 11:14

## 2021-12-08 RX ADMIN — ATORVASTATIN CALCIUM 80 MILLIGRAM(S): 80 TABLET, FILM COATED ORAL at 21:53

## 2021-12-08 RX ADMIN — ONDANSETRON 4 MILLIGRAM(S): 8 TABLET, FILM COATED ORAL at 18:38

## 2021-12-08 RX ADMIN — Medication 25 GRAM(S): at 17:09

## 2021-12-08 RX ADMIN — Medication 500 MILLIGRAM(S): at 16:02

## 2021-12-08 RX ADMIN — CEFEPIME 100 MILLIGRAM(S): 1 INJECTION, POWDER, FOR SOLUTION INTRAMUSCULAR; INTRAVENOUS at 06:36

## 2021-12-08 RX ADMIN — BUPROPION HYDROCHLORIDE 300 MILLIGRAM(S): 150 TABLET, EXTENDED RELEASE ORAL at 11:03

## 2021-12-08 RX ADMIN — CEFEPIME 100 MILLIGRAM(S): 1 INJECTION, POWDER, FOR SOLUTION INTRAMUSCULAR; INTRAVENOUS at 17:08

## 2021-12-08 RX ADMIN — DULOXETINE HYDROCHLORIDE 60 MILLIGRAM(S): 30 CAPSULE, DELAYED RELEASE ORAL at 11:03

## 2021-12-08 RX ADMIN — CEFEPIME 100 MILLIGRAM(S): 1 INJECTION, POWDER, FOR SOLUTION INTRAMUSCULAR; INTRAVENOUS at 21:53

## 2021-12-08 RX ADMIN — Medication 25 MILLIGRAM(S): at 21:53

## 2021-12-08 NOTE — CONSULT NOTE ADULT - TIME BILLING
I have personally seen and examined this patient.  I have reviewed all pertinent clinical information and reviewed all relevant imaging and diagnostic studies personally.   I counseled the patient about diagnostic testing and treatment plan. All questions were answered.  I discussed recommendations with the primary team.
Coordination of care

## 2021-12-08 NOTE — PATIENT PROFILE ADULT - FALL HARM RISK - UNIVERSAL INTERVENTIONS
Bed in lowest position, wheels locked, appropriate side rails in place/Call bell, personal items and telephone in reach/Instruct patient to call for assistance before getting out of bed or chair/Non-slip footwear when patient is out of bed/[place] to call system/Physically safe environment - no spills, clutter or unnecessary equipment/Purposeful Proactive Rounding/Room/bathroom lighting operational, light cord in reach

## 2021-12-08 NOTE — CONSULT NOTE ADULT - ASSESSMENT
ASSESSMENT  71 year old female with PMH of IBS, pancreatitis, Breast Ca s/p radiation on Femara and Letrozole presented to the hospital after being sent in for Dr. Babin for severe candida esophagitis with persistent nausea, vomiting and diarrhea for over two weeks. Patient also has multiple small red lesions on her right buttock.     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

## 2021-12-08 NOTE — CONSULT NOTE ADULT - ASSESSMENT
71 year old female with PMH of IBS-D ( Imodium, amitriptyline) , pancreatitis one time due to gravol as per patient, Breast Ca s/p radiation on Letrozole presented to the hospital for Nausea and lower abdominal pain relieved after she had BM.    # Sever candida esophagitis:  - was on PO diflucan ( since 12/4) with no symptomatic improvement   - EGD: 12/3--> sever candida esophagitis ( confirmed on biopsy), HP neg  - patient currently has leukopenia   - high lactate on admission 4.1 --> trending down after IV fluid    Rec:  - Start IV diflucan ( double dose) 400 mg BID as did not respond to PO regular dose  - hem/onc eval for leukopenia ( on letrozole , dose not cause leukopenia)  - check for HIV  - IV fluid  - zofran PRN  - advance diet as tolerated    #IBS-D:  - c/w amitriptyline  - hold off imodium for now ( did not have BM for 3-4 days and then had 1 soft BM yesterday )    #Persona history of Colon polyps:  - colonoscopy 10/29/21--> 3 polyps, 5-10mm, tubular adenoma    Rec:  - repeat colonoscopy in 3 years                 71 year old female with PMH of IBS-D ( Imodium, amitriptyline) , pancreatitis one time due to gravol as per patient, Breast Ca s/p radiation on Letrozole presented to the hospital for Nausea and lower abdominal pain relieved after she had BM.    # Severe candida esophagitis:  - was on PO diflucan ( since 12/4) with no symptomatic improvement   - EGD: 12/3--> sever candida esophagitis ( confirmed on biopsy), HP neg  - patient currently has leukopenia   - high lactate on admission 4.1 --> trending down after IV fluid    Rec:  - Start IV diflucan ( double dose) 400 mg BID as did not respond to PO regular dose  - hem/onc eval for leukopenia ( on letrozole , dose not cause leukopenia)  - check for HIV  - IV fluid  - zofran PRN  - advance diet as tolerated    #IBS-D:  - c/w amitriptyline  - hold off imodium for now ( did not have BM for 3-4 days and then had 1 soft BM yesterday )    #Persona history of Colon polyps:  - colonoscopy 10/29/21--> 3 polyps, 5-10mm, tubular adenoma  Rec:  - repeat colonoscopy in 3 years

## 2021-12-08 NOTE — CONSULT NOTE ADULT - SUBJECTIVE AND OBJECTIVE BOX
MANDY THOMPSON  71y, Female  Allergy: penicillin (Unknown)    CHIEF COMPLAINT:   HPI:  HPI:  71 year old female with PMH of IBS, pancreatitis, Breast Ca s/p radiation on Femara and Letrozole presented to the hospital after being sent in for Dr. Babin for severe candida esophagitis. Patient's symptoms started after return from [place] on . She had many bouts of n/v with the absence of BMs. Patient came to the ED  for abdominal distension and inability to have a BM, CT AP did not show evidence of bowel obstruction or pneumoperitoneum at that time. Patient has since started having BMs and is able to tolerate a puree diet.  Patient had recent EGD with Dr. Babin on 12/3/21 which showed esophageal candidiasis and was started on PO diflucan. Patient was not responding to PO antifungals so she was sent in for further work up.     In the ED, /73, , RR 18, Temp 95.7F, SpO2 100% on RA. CT Abdomen and Pelvis with IV contrast showed no acute intra-abdominal pathology.  Labs showed leukopenia 0.95 and lactate 4.3. Patient received 2L of IVF, cefepime, and flagyl in the ED and was admitted to medicine. (07 Dec 2021 18:53)    Patient endorses multiple small red lesions on her right buttock that appeared about one week ago.       Infectious Diseases History:  Old Micro Data/Cultures:     FAMILY HISTORY:  No pertinent family history in first degree relatives      PAST MEDICAL & SURGICAL HISTORY:  Pancreatitis, gallstone    Breast cancer  RT    Hypertension    Shingles rash    History of major abdominal surgery    History of seizures as a child    GERD (gastroesophageal reflux disease)     delivery delivered  x2    Abnormal cells in breast  RT breast bumpectomy        SOCIAL HISTORY  Social History:  Denies smoking and drug use.  occasional alcohol use (07 Dec 2021 18:53)      Recent Travel:  Other Exposures:     ROS  General: Denies nightsweats, endorses chills, weight loss, loss of appetite  HEENT: Denies, rhinorrhea, sore throat, eye pain. Endorses dizziness, headache  CV: Denies CP, palpitations  PULM: Denies wheezing, hemoptysis  GI: Denies hematemesis, hematochezia, melena. Endorses nausea, NBNB vomiting, diarrhea, abdominal pain  : Denies discharge, hematuria  MSK: Denies arthralgias, myalgias  SKIN: Endorses lesions on R buttock  NEURO: Denies paresthesias, weakness  PSYCH: Denies depression, anxiety    VITALS:  T(F): 97.8, Max: 98.2 (21 @ 20:42)  HR: 66  BP: 111/62  RR: 18Vital Signs Last 24 Hrs  T(C): 36.6 (08 Dec 2021 07:52), Max: 36.8 (07 Dec 2021 20:42)  T(F): 97.8 (08 Dec 2021 07:52), Max: 98.2 (07 Dec 2021 20:42)  HR: 66 (08 Dec 2021 07:52) (66 - 85)  BP: 111/62 (08 Dec 2021 07:52) (104/58 - 120/75)  BP(mean): --  RR: 18 (08 Dec 2021 07:52) (18 - 18)  SpO2: 98% (08 Dec 2021 07:52) (94% - 98%)    PHYSICAL EXAM:  Gen: NAD, resting in bed  HEENT: Normocephalic, atraumatic  Neck: supple, no lymphadenopathy  CV: Regular rate & regular rhythm, S1S2+  Lungs: CTAB, no wheezes, rales or rhonchi  Abdomen: BS+, distended, tympanic, nontender  Ext: Warm, well perfused, no LE edema  Neuro: non focal, awake  Skin: Lesions on R buttock  Lines: no phlebitis    TESTS & MEASUREMENTS:                        11.3   1.47  )-----------( 219      ( 08 Dec 2021 11:00 )             35.3         139  |  106  |  14  ----------------------------<  98  4.0   |  18  |  1.1    Ca    8.6      08 Dec 2021 04:30  Mg     1.4         TPro  5.1<L>  /  Alb  3.2<L>  /  TBili  0.6  /  DBili  x   /  AST  12  /  ALT  7   /  AlkPhos  52      eGFR if Non African American: 51 mL/min/1.73M2 (21 @ 04:30)  eGFR if African American: 59 mL/min/1.73M2 (21 @ 04:30)  eGFR if Non African American: 65 mL/min/1.73M2 (21 @ 16:19)  eGFR if : 75 mL/min/1.73M2 (21 @ 16:19)    LIVER FUNCTIONS - ( 08 Dec 2021 04:30 )  Alb: 3.2 g/dL / Pro: 5.1 g/dL / ALK PHOS: 52 U/L / ALT: 7 U/L / AST: 12 U/L / GGT: x                 Lactate, Blood: 2.6 mmol/L (21 @ 04:30)  Lactate, Blood: 3.4 mmol/L (21 @ 21:08)  Blood Gas Venous - Lactate: 2.70 mmol/L (21 @ 21:01)  Lactate, Blood: 5.2 mmol/L (21 @ 18:34)  Lactate, Blood: 4.1 mmol/L (21 @ 16:19)      INFECTIOUS DISEASES TESTING      RADIOLOGY & ADDITIONAL TESTS:  I have personally reviewed the last available Chest xray  CXR      CT  CT Abdomen and Pelvis w/ IV Cont:   EXAM:  CT ABDOMEN AND PELVIS IC            PROCEDURE DATE:  2021            INTERPRETATION:  CLINICAL STATEMENT: Abdominal pain    TECHNIQUE: Contiguous axial CT images were obtained from the lower chest to the pubic symphysis following administration of 100cc Optiray 320 intravenous contrast.  Oral contrast was not administered.  Reformatted images in the coronal and sagittal planes were acquired.    COMPARISON CT: 2021    OTHER STUDIES USED FOR CORRELATION: None.      FINDINGS:    LOWER CHEST: Lingular atelectasis..    HEPATOBILIARY: No suspicious parenchymal lesion or biliary ductal dilatation.    SPLEEN: Unremarkable.    PANCREAS: Unremarkable.    ADRENAL GLANDS: Unremarkable.    KIDNEYS: Symmetric renal enhancement without hydronephrosis bilaterally.    ABDOMINOPELVIC NODES: Unremarkable.    PELVIC ORGANS: Left adnexal calcifications.    PERITONEUM/MESENTERY/BOWEL: No bowel obstruction, pneumoperitoneum or ascites. Sigmoid diverticulosis without diverticulitis. Normal caliber appendix..    BONES/SOFT TISSUES: Mild compression deformity T12 vertebral body, unchanged. Chronic degenerative changes throughout the spine..    OTHER: Midline abdominal surgical clips, unchanged. Right breast 2.4 cm soft tissue lesion(series 4 image 11)      IMPRESSION:    1. No acute intra-abdominal pathology.    2. Partially imaged right breast 2.4 cm soft tissue nodule, unchanged.    --- End of Report ---              ELLIOT LANDAU MD; Attending Radiologist  This document has been electronically signed. Dec  7 2021  8:09PM (21 @ 19:30)      CARDIOLOGY TESTING      All available historical records have been reviewed    MEDICATIONS  amitriptyline 25  atorvastatin 80  buPROPion XL (24-Hour) . 300  cefepime   IVPB 2000  DULoxetine 60  enoxaparin Injectable 40  fluconAZOLE IVPB 400  magnesium sulfate  IVPB 2  metroNIDAZOLE    Tablet 500  pantoprazole    Tablet 40      ANTIBIOTICS:  cefepime   IVPB 2000 milliGRAM(s) IV Intermittent every 8 hours  fluconAZOLE IVPB 400 milliGRAM(s) IV Intermittent every 24 hours  metroNIDAZOLE    Tablet 500 milliGRAM(s) Oral every 8 hours      All available historical data has been reviewed    ASSESSMENT  71 year old female with PMH of IBS, pancreatitis, Breast Ca s/p radiation on Femara and Letrozole presented to the hospital after being sent in for Dr. Babin for severe candida esophagitis with persistent nausea, vomiting and diarrhea for over two weeks. Patient also has multiple small red lesions on her right buttock.     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 diff/Stool PCR likely negative due to negative test on   - Consider daily valtrex for likely HSV on buttock         MANDY THOMPSON  71y, Female  Allergy: penicillin (Unknown)    CHIEF COMPLAINT:   HPI:  HPI:  71 year old female with PMH of IBS, pancreatitis, Breast Ca s/p radiation on Femara and Letrozole presented to the hospital after being sent in for Dr. Babin for severe candida esophagitis. Patient's symptoms started after return from [place] on . She had many bouts of n/v with the absence of BMs. Patient came to the ED  for abdominal distension and inability to have a BM, CT AP did not show evidence of bowel obstruction or pneumoperitoneum at that time. Patient has since started having BMs and is able to tolerate a puree diet.  Patient had recent EGD with Dr. Babin on 12/3/21 which showed esophageal candidiasis and was started on PO diflucan. Patient was not responding to PO antifungals so she was sent in for further work up.     In the ED, /73, , RR 18, Temp 95.7F, SpO2 100% on RA. CT Abdomen and Pelvis with IV contrast showed no acute intra-abdominal pathology.  Labs showed leukopenia 0.95 and lactate 4.3. Patient received 2L of IVF, cefepime, and flagyl in the ED and was admitted to medicine. (07 Dec 2021 18:53)    Patient endorses multiple small red lesions on her right buttock that appeared about one week ago.       Infectious Diseases History:  Old Micro Data/Cultures: NA    FAMILY HISTORY:  No pertinent family history in first degree relatives      PAST MEDICAL & SURGICAL HISTORY:  Pancreatitis, gallstone    Breast cancer  RT    Hypertension    Shingles rash    History of major abdominal surgery    History of seizures as a child    GERD (gastroesophageal reflux disease)     delivery delivered  x2    Abnormal cells in breast  RT breast bumpectomy        SOCIAL HISTORY  Social History:  Denies smoking and drug use.  occasional alcohol use (07 Dec 2021 18:53)      Recent Travel:  Other Exposures:     ROS  General: Denies nightsweats, endorses chills, weight loss, loss of appetite  HEENT: Denies, rhinorrhea, sore throat, eye pain. Endorses dizziness, headache  CV: Denies CP, palpitations  PULM: Denies wheezing, hemoptysis  GI: Denies hematemesis, hematochezia, melena. Endorses nausea, NBNB vomiting, diarrhea, abdominal pain  : Denies discharge, hematuria  MSK: Denies arthralgias, myalgias  SKIN: Endorses lesions on R buttock  NEURO: Denies paresthesias, weakness  PSYCH: Denies depression, anxiety    VITALS:  T(F): 97.8, Max: 98.2 (21 @ 20:42)  HR: 66  BP: 111/62  RR: 18Vital Signs Last 24 Hrs  T(C): 36.6 (08 Dec 2021 07:52), Max: 36.8 (07 Dec 2021 20:42)  T(F): 97.8 (08 Dec 2021 07:52), Max: 98.2 (07 Dec 2021 20:42)  HR: 66 (08 Dec 2021 07:52) (66 - 85)  BP: 111/62 (08 Dec 2021 07:52) (104/58 - 120/75)  BP(mean): --  RR: 18 (08 Dec 2021 07:52) (18 - 18)  SpO2: 98% (08 Dec 2021 07:52) (94% - 98%)    PHYSICAL EXAM:  Gen: NAD, resting in bed  HEENT: Normocephalic, atraumatic  Neck: supple, no lymphadenopathy  CV: Regular rate & regular rhythm, S1S2+  Lungs: CTAB, no wheezes, rales or rhonchi  Abdomen: BS+, distended, tympanic, nontender  Ext: Warm, well perfused, no LE edema  Neuro: non focal, awake  Skin: Lesions on R buttock  Lines: no phlebitis    TESTS & MEASUREMENTS:                        11.3   1.47  )-----------( 219      ( 08 Dec 2021 11:00 )             35.3         139  |  106  |  14  ----------------------------<  98  4.0   |  18  |  1.1    Ca    8.6      08 Dec 2021 04:30  Mg     1.4         TPro  5.1<L>  /  Alb  3.2<L>  /  TBili  0.6  /  DBili  x   /  AST  12  /  ALT  7   /  AlkPhos  52      eGFR if Non African American: 51 mL/min/1.73M2 (21 @ 04:30)  eGFR if African American: 59 mL/min/1.73M2 (21 @ 04:30)  eGFR if Non African American: 65 mL/min/1.73M2 (21 @ 16:19)  eGFR if : 75 mL/min/1.73M2 (21 @ 16:19)    LIVER FUNCTIONS - ( 08 Dec 2021 04:30 )  Alb: 3.2 g/dL / Pro: 5.1 g/dL / ALK PHOS: 52 U/L / ALT: 7 U/L / AST: 12 U/L / GGT: x                 Lactate, Blood: 2.6 mmol/L (21 @ 04:30)  Lactate, Blood: 3.4 mmol/L (21 @ 21:08)  Blood Gas Venous - Lactate: 2.70 mmol/L (21 @ 21:01)  Lactate, Blood: 5.2 mmol/L (21 @ 18:34)  Lactate, Blood: 4.1 mmol/L (21 @ 16:19)      INFECTIOUS DISEASES TESTING      RADIOLOGY & ADDITIONAL TESTS:  I have personally reviewed the last available Chest xray  CXR      CT  CT Abdomen and Pelvis w/ IV Cont:   EXAM:  CT ABDOMEN AND PELVIS IC            PROCEDURE DATE:  2021            INTERPRETATION:  CLINICAL STATEMENT: Abdominal pain    TECHNIQUE: Contiguous axial CT images were obtained from the lower chest to the pubic symphysis following administration of 100cc Optiray 320 intravenous contrast.  Oral contrast was not administered.  Reformatted images in the coronal and sagittal planes were acquired.    COMPARISON CT: 2021    OTHER STUDIES USED FOR CORRELATION: None.      FINDINGS:    LOWER CHEST: Lingular atelectasis..    HEPATOBILIARY: No suspicious parenchymal lesion or biliary ductal dilatation.    SPLEEN: Unremarkable.    PANCREAS: Unremarkable.    ADRENAL GLANDS: Unremarkable.    KIDNEYS: Symmetric renal enhancement without hydronephrosis bilaterally.    ABDOMINOPELVIC NODES: Unremarkable.    PELVIC ORGANS: Left adnexal calcifications.    PERITONEUM/MESENTERY/BOWEL: No bowel obstruction, pneumoperitoneum or ascites. Sigmoid diverticulosis without diverticulitis. Normal caliber appendix..    BONES/SOFT TISSUES: Mild compression deformity T12 vertebral body, unchanged. Chronic degenerative changes throughout the spine..    OTHER: Midline abdominal surgical clips, unchanged. Right breast 2.4 cm soft tissue lesion(series 4 image 11)      IMPRESSION:    1. No acute intra-abdominal pathology.    2. Partially imaged right breast 2.4 cm soft tissue nodule, unchanged.    --- End of Report ---              ELLIOT LANDAU MD; Attending Radiologist  This document has been electronically signed. Dec  7 2021  8:09PM (21 @ 19:30)      CARDIOLOGY TESTING      All available historical records have been reviewed    MEDICATIONS  amitriptyline 25  atorvastatin 80  buPROPion XL (24-Hour) . 300  cefepime   IVPB 2000  DULoxetine 60  enoxaparin Injectable 40  fluconAZOLE IVPB 400  magnesium sulfate  IVPB 2  metroNIDAZOLE    Tablet 500  pantoprazole    Tablet 40      ANTIBIOTICS:  cefepime   IVPB 2000 milliGRAM(s) IV Intermittent every 8 hours  fluconAZOLE IVPB 400 milliGRAM(s) IV Intermittent every 24 hours  metroNIDAZOLE    Tablet 500 milliGRAM(s) Oral every 8 hours      All available historical data has been reviewed

## 2021-12-08 NOTE — CONSULT NOTE ADULT - SUBJECTIVE AND OBJECTIVE BOX
Gastroenterology Consultation:    Patient is a 71y old  Female who presents with a chief complaint of     Admitted on: 21  HPI:  71 year old female with PMH of IBS-D ( Imodium, amitriptyline) , pancreatitis one time due to gravol as per patient, Breast Ca s/p radiation on Letrozole presented to the hospital after being sent in for Dr. Babin for severe candida esophagitis. Patient's symptoms started after return from [place] on . She had many bouts of n/v with the absence of BMs. Patient came to the ED  for abdominal distension and inability to have a BM, CT AP did not show evidence of bowel obstruction or pneumoperitoneum at that time. Patient has since started having BMs and is able to tolerate a puree diet.  Patient had recent EGD with Dr. Babin on 12/3/21 which showed sever esophageal candidiasis and was started on PO diflucan since . Patient was not responding to PO antifungals so she was sent in for further work up.     In the ED, /73, , RR 18, Temp 95.7F, SpO2 100% on RA. CT Abdomen and Pelvis with IV contrast showed no acute intra-abdominal pathology.  Labs showed leukopenia 0.95 and lactate 4.3. Patient received 2L of IVF, cefepime, and flagyl in the ED and was admitted to medicine. (07 Dec 2021 18:53)      Prior records Reviewed (Y/N):  History obtained from person other than patient (Y/N):    Prior EGD:  Prior Colonoscopy:      PAST MEDICAL & SURGICAL HISTORY:  Pancreatitis, gallstone    Breast cancer  RT    Hypertension    Shingles rash    History of major abdominal surgery    History of seizures as a child    GERD (gastroesophageal reflux disease)     delivery delivered  x2    Abnormal cells in breast  RT breast bumpectomy        FAMILY HISTORY:  No pertinent family history in first degree relatives        Social History:  Tobacco:  Alcohol:  Drugs:    Home Medications:  amitriptyline 25 mg oral tablet: 1 tab(s) orally once a day (at bedtime) (07 Dec 2021 20:50)  DULoxetine 60 mg oral delayed release capsule: 1 cap(s) orally once a day (07 Dec 2021 20:50)  Femara 2.5 mg oral tablet: 1 tab(s) orally once a day (07 Dec 2021 20:50)  Imodium 2 mg oral capsule: 1 cap(s) orally every 4 hours, As Needed (07 Dec 2021 20:50)  losartan 100 mg oral tablet: 1 tab(s) orally once a day (07 Dec 2021 20:50)  omeprazole 40 mg oral delayed release capsule: 1 cap(s) orally once a day (07 Dec 2021 20:50)  rosuvastatin 20 mg oral tablet: 1 tab(s) orally once a day (07 Dec 2021 20:50)  Wellbutrin  mg/24 hours oral tablet, extended release: 1 tab(s) orally every 24 hours (07 Dec 2021 20:50)  Xanax 0.25 mg oral tablet: 1 tab(s) orally once a day (at bedtime), As Needed (07 Dec 2021 20:51)    MEDICATIONS  (STANDING):  amitriptyline 25 milliGRAM(s) Oral at bedtime  atorvastatin 80 milliGRAM(s) Oral at bedtime  buPROPion XL (24-Hour) . 300 milliGRAM(s) Oral daily  cefepime   IVPB 2000 milliGRAM(s) IV Intermittent every 8 hours  DULoxetine 60 milliGRAM(s) Oral daily  enoxaparin Injectable 40 milliGRAM(s) SubCutaneous daily  fluconAZOLE IVPB 400 milliGRAM(s) IV Intermittent every 24 hours  metroNIDAZOLE    Tablet 500 milliGRAM(s) Oral every 8 hours  pantoprazole    Tablet 40 milliGRAM(s) Oral before breakfast    MEDICATIONS  (PRN):  ALPRAZolam 0.25 milliGRAM(s) Oral at bedtime PRN insomnia  ondansetron Injectable 4 milliGRAM(s) IV Push every 6 hours PRN Nausea and/or Vomiting      Allergies  penicillin (Unknown)      Review of Systems:   Constitutional:  No Fever, No Chills  ENT/Mouth:  No Hearing Changes,  No Difficulty Swallowing  Eyes:  No Eye Pain, No Vision Changes  Cardiovascular:  No Chest Pain, No Palpitations  Respiratory:  No Cough, No Dyspnea  Gastrointestinal:  As described in HPI  Musculoskeletal:  No Joint Swelling, No Back Pain  Skin:  No Skin Lesions, No Jaundice  Neuro:  No Syncope, No Dizziness  Heme/Lymph:  No Bruising, No Bleeding.          Physical Examination:  T(C): 36.6 (21 @ 07:52), Max: 36.8 (21 @ 20:42)  HR: 66 (21 @ 07:52) (66 - 107)  BP: 111/62 (21 @ 07:52) (104/58 - 132/73)  RR: 18 (21 @ 07:52) (18 - 18)  SpO2: 98% (21 @ 07:52) (94% - 100%)  Height (cm): 157.5 (21 @ 14:35)  Weight (kg): 81.6 (21 @ 14:35)      Constitutional: No acute distress.  Eyes:. Conjunctivae are clear, Sclera is non-icteric.  Ears Nose and Throat: The external ears are normal appearing,  Oral mucosa is pink and moist.  Respiratory:  No signs of respiratory distress. Lung sounds are clear bilaterally.  Cardiovascular:  S1 S2, Regular rate and rhythm.  GI: Abdomen is soft, symmetric, and non-tender without distention. There are no visible lesions. Bowel sounds are present and normoactive in all four quadrants. No masses, hepatomegaly, or splenomegaly are noted.   Neuro: No Tremor, No involuntary movements  Skin: No rashes, No Jaundice.          Data: (reviewed by attending)                        10.7   1.65  )-----------( 222      ( 08 Dec 2021 04:30 )             32.8     Hgb Trend:  10.7  21 @ 04:30  15.4  21 @ 16:19      12    139  |  106  |  14  ----------------------------<  98  4.0   |  18  |  1.1    Ca    8.6      08 Dec 2021 04:30  Mg     1.4         TPro  5.1<L>  /  Alb  3.2<L>  /  TBili  0.6  /  DBili  x   /  AST  12  /  ALT  7   /  AlkPhos  52      Liver panel trend:  TBili 0.6   /   AST 12   /   ALT 7   /   AlkP 52   /   Tptn 5.1   /   Alb 3.2    /   DBili --        TBili 0.5   /   AST 21   /   ALT 10   /   AlkP 80   /   Tptn 7.0   /   Alb 4.3    /   DBili --                    Radiology:(reviewed by attending)  CT Abdomen and Pelvis w/ IV Cont:   EXAM:  CT ABDOMEN AND PELVIS IC            PROCEDURE DATE:  2021            INTERPRETATION:  CLINICAL STATEMENT: Abdominal pain    TECHNIQUE: Contiguous axial CT images were obtained from the lower chest to the pubic symphysis following administration of 100cc Optiray 320 intravenous contrast.  Oral contrast was not administered.  Reformatted images in the coronal and sagittal planes were acquired.    COMPARISON CT: 2021    OTHER STUDIES USED FOR CORRELATION: None.      FINDINGS:    LOWER CHEST: Lingular atelectasis..    HEPATOBILIARY: No suspicious parenchymal lesion or biliary ductal dilatation.    SPLEEN: Unremarkable.    PANCREAS: Unremarkable.    ADRENAL GLANDS: Unremarkable.    KIDNEYS: Symmetric renal enhancement without hydronephrosis bilaterally.    ABDOMINOPELVIC NODES: Unremarkable.    PELVIC ORGANS: Left adnexal calcifications.    PERITONEUM/MESENTERY/BOWEL: No bowel obstruction, pneumoperitoneum or ascites. Sigmoid diverticulosis without diverticulitis. Normal caliber appendix..    BONES/SOFT TISSUES: Mild compression deformity T12 vertebral body, unchanged. Chronic degenerative changes throughout the spine..    OTHER: Midline abdominal surgical clips, unchanged. Right breast 2.4 cm soft tissue lesion(series 4 image 11)      IMPRESSION:    1. No acute intra-abdominal pathology.    2. Partially imaged right breast 2.4 cm soft tissue nodule, unchanged.    --- End of Report ---              ELLIOT LANDAU MD; Attending Radiologist  This document has been electronically signed. Dec  7 2021  8:09PM (21 @ 19:30)       Gastroenterology Consultation:    Patient is a 71y old  Female who presents with a chief complaint of     Admitted on: 21  HPI:  71 year old female with PMH of IBS-D ( Imodium, amitriptyline) , pancreatitis one time due to gravol as per patient, Breast Ca s/p radiation on Letrozole presented to the hospital after being sent in for Dr. Babin for severe candida esophagitis. Patient's symptoms started after return from [place] on . She had many bouts of n/v with the absence of BMs. Patient came to the ED  for abdominal distension and inability to have a BM, CT AP did not show evidence of bowel obstruction or pneumoperitoneum at that time. Patient has since started having BMs and is able to tolerate a puree diet.  Patient had recent EGD with Dr. Babin on 12/3/21 which showed sever esophageal candidiasis and was started on PO diflucan since . Patient was not responding to PO antifungals so she was sent in for further work up.     In the ED, /73, , RR 18, Temp 95.7F, SpO2 100% on RA. CT Abdomen and Pelvis with IV contrast showed no acute intra-abdominal pathology.  Labs showed leukopenia 0.95 and lactate 4.3. Patient received 2L of IVF, cefepime, and flagyl in the ED and was admitted to medicine.       Prior records Reviewed (Y/N): Y  History obtained from person other than patient (Y/N): N    Prior EGD:  < from: EGD (21 @ 15:45) >  Impressions:    Esophageal candidiasis (Biopsy).    Erythema in the stomach compatible with non-erosive gastritis. (Biopsy).    Normal mucosa in the whole examined duodenum. (Biopsy).     < end of copied text >    Prior Colonoscopy:    < from: Colonoscopy (10.29.21 @ 08:45) >  Impressions:    Polyp (5 mm) in the rectum. (Polypectomy).    Polyp(7 mm) in the proximal descending colon. (Polypectomy).    Polyp (10 mm) in the mid-transverse colon. (Polypectomy).    External hemorrhoids.    Mild diverticulosis of the the left side of the colon.    Otherwise normal colon and terminal ileum (Biopsy).     < end of copied text >        PAST MEDICAL & SURGICAL HISTORY:  Pancreatitis, gallstone    Breast cancer  RT    Hypertension    Shingles rash    History of major abdominal surgery    History of seizures as a child    GERD (gastroesophageal reflux disease)     delivery delivered  x2    Abnormal cells in breast  RT breast bumpectomy        FAMILY HISTORY:  No pertinent family history in first degree relatives        Social History:  Tobacco:  Alcohol:  Drugs:    Home Medications:  amitriptyline 25 mg oral tablet: 1 tab(s) orally once a day (at bedtime) (07 Dec 2021 20:50)  DULoxetine 60 mg oral delayed release capsule: 1 cap(s) orally once a day (07 Dec 2021 20:50)  Femara 2.5 mg oral tablet: 1 tab(s) orally once a day (07 Dec 2021 20:50)  Imodium 2 mg oral capsule: 1 cap(s) orally every 4 hours, As Needed (07 Dec 2021 20:50)  losartan 100 mg oral tablet: 1 tab(s) orally once a day (07 Dec 2021 20:50)  omeprazole 40 mg oral delayed release capsule: 1 cap(s) orally once a day (07 Dec 2021 20:50)  rosuvastatin 20 mg oral tablet: 1 tab(s) orally once a day (07 Dec 2021 20:50)  Wellbutrin  mg/24 hours oral tablet, extended release: 1 tab(s) orally every 24 hours (07 Dec 2021 20:50)  Xanax 0.25 mg oral tablet: 1 tab(s) orally once a day (at bedtime), As Needed (07 Dec 2021 20:51)    MEDICATIONS  (STANDING):  amitriptyline 25 milliGRAM(s) Oral at bedtime  atorvastatin 80 milliGRAM(s) Oral at bedtime  buPROPion XL (24-Hour) . 300 milliGRAM(s) Oral daily  cefepime   IVPB 2000 milliGRAM(s) IV Intermittent every 8 hours  DULoxetine 60 milliGRAM(s) Oral daily  enoxaparin Injectable 40 milliGRAM(s) SubCutaneous daily  fluconAZOLE IVPB 400 milliGRAM(s) IV Intermittent every 24 hours  metroNIDAZOLE    Tablet 500 milliGRAM(s) Oral every 8 hours  pantoprazole    Tablet 40 milliGRAM(s) Oral before breakfast    MEDICATIONS  (PRN):  ALPRAZolam 0.25 milliGRAM(s) Oral at bedtime PRN insomnia  ondansetron Injectable 4 milliGRAM(s) IV Push every 6 hours PRN Nausea and/or Vomiting      Allergies  penicillin (Unknown)      Review of Systems:   Constitutional:  No Fever, No Chills  ENT/Mouth:  No Hearing Changes,  No Difficulty Swallowing  Eyes:  No Eye Pain, No Vision Changes  Cardiovascular:  No Chest Pain, No Palpitations  Respiratory:  No Cough, No Dyspnea  Gastrointestinal:  As described in HPI  Musculoskeletal:  No Joint Swelling, No Back Pain  Skin:  No Skin Lesions, No Jaundice  Neuro:  No Syncope, No Dizziness  Heme/Lymph:  No Bruising, No Bleeding.          Physical Examination:  T(C): 36.6 (21 @ 07:52), Max: 36.8 (21 @ 20:42)  HR: 66 (21 @ 07:52) (66 - 107)  BP: 111/62 (21 @ 07:52) (104/58 - 132/73)  RR: 18 (21 @ 07:52) (18 - 18)  SpO2: 98% (21 @ 07:52) (94% - 100%)  Height (cm): 157.5 (21 @ 14:35)  Weight (kg): 81.6 (21 @ 14:35)      Constitutional: No acute distress.  Eyes:. Conjunctivae are clear, Sclera is non-icteric.  Ears Nose and Throat: The external ears are normal appearing,  Oral mucosa is pink and moist.  Respiratory:  No signs of respiratory distress. Lung sounds are clear bilaterally.  Cardiovascular:  S1 S2, Regular rate and rhythm.  GI: Abdomen is soft, symmetric, and non-tender without distention. There are no visible lesions. Bowel sounds are present and normoactive in all four quadrants. No masses, hepatomegaly, or splenomegaly are noted.   Neuro: No Tremor, No involuntary movements  Skin: No rashes, No Jaundice.          Data: (reviewed by attending)                        10.7   1.65  )-----------( 222      ( 08 Dec 2021 04:30 )             32.8     Hgb Trend:  10.7  21 @ 04:30  15.4  21 @ 16:19          139  |  106  |  14  ----------------------------<  98  4.0   |  18  |  1.1    Ca    8.6      08 Dec 2021 04:30  Mg     1.4         TPro  5.1<L>  /  Alb  3.2<L>  /  TBili  0.6  /  DBili  x   /  AST  12  /  ALT  7   /  AlkPhos  52      Liver panel trend:  TBili 0.6   /   AST 12   /   ALT 7   /   AlkP 52   /   Tptn 5.1   /   Alb 3.2    /   DBili --        TBili 0.5   /   AST 21   /   ALT 10   /   AlkP 80   /   Tptn 7.0   /   Alb 4.3    /   DBili --                    Radiology:(reviewed by attending)  CT Abdomen and Pelvis w/ IV Cont:   EXAM:  CT ABDOMEN AND PELVIS IC            PROCEDURE DATE:  2021            INTERPRETATION:  CLINICAL STATEMENT: Abdominal pain    TECHNIQUE: Contiguous axial CT images were obtained from the lower chest to the pubic symphysis following administration of 100cc Optiray 320 intravenous contrast.  Oral contrast was not administered.  Reformatted images in the coronal and sagittal planes were acquired.    COMPARISON CT: 2021    OTHER STUDIES USED FOR CORRELATION: None.      FINDINGS:    LOWER CHEST: Lingular atelectasis..    HEPATOBILIARY: No suspicious parenchymal lesion or biliary ductal dilatation.    SPLEEN: Unremarkable.    PANCREAS: Unremarkable.    ADRENAL GLANDS: Unremarkable.    KIDNEYS: Symmetric renal enhancement without hydronephrosis bilaterally.    ABDOMINOPELVIC NODES: Unremarkable.    PELVIC ORGANS: Left adnexal calcifications.    PERITONEUM/MESENTERY/BOWEL: No bowel obstruction, pneumoperitoneum or ascites. Sigmoid diverticulosis without diverticulitis. Normal caliber appendix..    BONES/SOFT TISSUES: Mild compression deformity T12 vertebral body, unchanged. Chronic degenerative changes throughout the spine..    OTHER: Midline abdominal surgical clips, unchanged. Right breast 2.4 cm soft tissue lesion(series 4 image 11)      IMPRESSION:    1. No acute intra-abdominal pathology.    2. Partially imaged right breast 2.4 cm soft tissue nodule, unchanged.    --- End of Report ---              ELLIOT LANDAU MD; Attending Radiologist  This document has been electronically signed. Dec  7 2021  8:09PM (21 @ 19:30)

## 2021-12-08 NOTE — CONSULT NOTE ADULT - ATTENDING COMMENTS
I edited the note
I have personally seen and examined this patient.  I have reviewed all pertinent clinical information and reviewed all relevant imaging and diagnostic studies personally.   I counseled the patient about diagnostic testing and treatment plan. All questions were answered.  I discussed recommendations with the primary team.     #Severe neutropenia ANC <500, ruling out Severe sepsis T<36 P>90 WBC <4 lactic acidosis vs dehydration    Afebrile , no focal sign of infection     cTAP unremarkable     12/1 stool studies negative   #Candida esophagitis dx on EGD  #On chemo   #CT atelectasis    - agree with antibiotic plan as per above     If any questions, please call or send a message on [employer] Teams  Please continue to update ID with any pertinent new laboratory or radiographic findings  Spectra [phone]

## 2021-12-08 NOTE — PROGRESS NOTE ADULT - ASSESSMENT
71 year old female with PMH of IBS, pancreatitis, Breast Ca s/p radiation on Femara presented to the hospital after being sent in for Dr. Babin for severe александр esophagitis.     #Severe candida esophagitis   #Sepsis present on admission likely secondary to esophagitis  #HAGMA likely secondary to lactic acidosis   - was on PO diflucan ( since 12/4) with no symptomatic improvement   - EGD: 12/3 -> sever candida esophagitis (confirmed on biopsy), HP neg  - Patient hypothermic, tachycardic, leukopenic, with a lactic acidosis on admission   - s/p 2L IVF, cefepime, and flagyl in the ED   - continue with cefepime and flagyl for now   - GI consult appreciated: Start IV diflucan (double dose) 400 mg BID as did not respond to PO regular dose  - hem/onc eval for leukopenia (on letrozole, dose not cause leukopenia)  - check for HIV  - Follow up stool studies (C. Diff PCR, acid fast, culture, ova and parasites),   - Follow up blood cultures, urine cultures, procal, fungitell   - Lactate trending down  - obtain MRSA nares  - ID consult     #Breast Cancer  #Leukopenia possibly secondary to breast Ca management  - neutropenia and leukopenia are possible side effects of femara   - ANC 0.16 >   - trend ANC  - heme/onc eval (follows with Dr. Mendoza)     #HTN - hold losartan 100mg PO daily for now, if hypertensive can restart   #HLD - continue with atorvastatin while inpatient   #Depression - continue with wellbutrin xL 300mg PO daily   #Fibromyalgia- continue with duloxetine 60mg PO daily   #GERD - continue with protonix for now     # DVT ppx: HSQ   # GI ppx: protonix   # Activity: IAT  # Diet: DASH Puree as tolerated    # Dispo: acute, from home   # Code status: discussed with patient at bedside, full code.

## 2021-12-09 LAB
ALBUMIN SERPL ELPH-MCNC: 3.5 G/DL — SIGNIFICANT CHANGE UP (ref 3.5–5.2)
ALP SERPL-CCNC: 56 U/L — SIGNIFICANT CHANGE UP (ref 30–115)
ALT FLD-CCNC: 8 U/L — SIGNIFICANT CHANGE UP (ref 0–41)
ANION GAP SERPL CALC-SCNC: 15 MMOL/L — HIGH (ref 7–14)
APPEARANCE UR: CLEAR — SIGNIFICANT CHANGE UP
AST SERPL-CCNC: 11 U/L — SIGNIFICANT CHANGE UP (ref 0–41)
BASOPHILS # BLD AUTO: 0.02 K/UL — SIGNIFICANT CHANGE UP (ref 0–0.2)
BASOPHILS NFR BLD AUTO: 1.4 % — HIGH (ref 0–1)
BILIRUB SERPL-MCNC: 0.5 MG/DL — SIGNIFICANT CHANGE UP (ref 0.2–1.2)
BILIRUB UR-MCNC: NEGATIVE — SIGNIFICANT CHANGE UP
BUN SERPL-MCNC: 10 MG/DL — SIGNIFICANT CHANGE UP (ref 10–20)
CALCIUM SERPL-MCNC: 8.8 MG/DL — SIGNIFICANT CHANGE UP (ref 8.5–10.1)
CHLORIDE SERPL-SCNC: 106 MMOL/L — SIGNIFICANT CHANGE UP (ref 98–110)
CO2 SERPL-SCNC: 19 MMOL/L — SIGNIFICANT CHANGE UP (ref 17–32)
COLOR SPEC: SIGNIFICANT CHANGE UP
CREAT SERPL-MCNC: 0.8 MG/DL — SIGNIFICANT CHANGE UP (ref 0.7–1.5)
DIFF PNL FLD: NEGATIVE — SIGNIFICANT CHANGE UP
EOSINOPHIL # BLD AUTO: 0.02 K/UL — SIGNIFICANT CHANGE UP (ref 0–0.7)
EOSINOPHIL NFR BLD AUTO: 1.4 % — SIGNIFICANT CHANGE UP (ref 0–8)
GLUCOSE SERPL-MCNC: 107 MG/DL — HIGH (ref 70–99)
GLUCOSE UR QL: NEGATIVE — SIGNIFICANT CHANGE UP
HCT VFR BLD CALC: 35.9 % — LOW (ref 37–47)
HGB BLD-MCNC: 11.6 G/DL — LOW (ref 12–16)
HIV 1+2 AB+HIV1 P24 AG SERPL QL IA: SIGNIFICANT CHANGE UP
IMM GRANULOCYTES NFR BLD AUTO: 1.4 % — HIGH (ref 0.1–0.3)
KETONES UR-MCNC: NEGATIVE — SIGNIFICANT CHANGE UP
LEUKOCYTE ESTERASE UR-ACNC: NEGATIVE — SIGNIFICANT CHANGE UP
LYMPHOCYTES # BLD AUTO: 0.81 K/UL — LOW (ref 1.2–3.4)
LYMPHOCYTES # BLD AUTO: 55.5 % — HIGH (ref 20.5–51.1)
MAGNESIUM SERPL-MCNC: 2.7 MG/DL — HIGH (ref 1.8–2.4)
MCHC RBC-ENTMCNC: 30.4 PG — SIGNIFICANT CHANGE UP (ref 27–31)
MCHC RBC-ENTMCNC: 32.3 G/DL — SIGNIFICANT CHANGE UP (ref 32–37)
MCV RBC AUTO: 94.2 FL — SIGNIFICANT CHANGE UP (ref 81–99)
MONOCYTES # BLD AUTO: 0.31 K/UL — SIGNIFICANT CHANGE UP (ref 0.1–0.6)
MONOCYTES NFR BLD AUTO: 21.2 % — HIGH (ref 1.7–9.3)
MRSA PCR RESULT.: NEGATIVE — SIGNIFICANT CHANGE UP
NEUTROPHILS # BLD AUTO: 0.28 K/UL — LOW (ref 1.4–6.5)
NEUTROPHILS NFR BLD AUTO: 19.1 % — LOW (ref 42.2–75.2)
NITRITE UR-MCNC: NEGATIVE — SIGNIFICANT CHANGE UP
NRBC # BLD: 0 /100 WBCS — SIGNIFICANT CHANGE UP (ref 0–0)
PH UR: 6.5 — SIGNIFICANT CHANGE UP (ref 5–8)
PLATELET # BLD AUTO: 237 K/UL — SIGNIFICANT CHANGE UP (ref 130–400)
POTASSIUM SERPL-MCNC: 4.2 MMOL/L — SIGNIFICANT CHANGE UP (ref 3.5–5)
POTASSIUM SERPL-SCNC: 4.2 MMOL/L — SIGNIFICANT CHANGE UP (ref 3.5–5)
PROT SERPL-MCNC: 5.7 G/DL — LOW (ref 6–8)
PROT UR-MCNC: SIGNIFICANT CHANGE UP
RBC # BLD: 3.81 M/UL — LOW (ref 4.2–5.4)
RBC # FLD: 13.3 % — SIGNIFICANT CHANGE UP (ref 11.5–14.5)
SODIUM SERPL-SCNC: 140 MMOL/L — SIGNIFICANT CHANGE UP (ref 135–146)
SP GR SPEC: 1.01 — SIGNIFICANT CHANGE UP (ref 1.01–1.03)
UROBILINOGEN FLD QL: SIGNIFICANT CHANGE UP
WBC # BLD: 1.46 K/UL — LOW (ref 4.8–10.8)
WBC # FLD AUTO: 1.46 K/UL — LOW (ref 4.8–10.8)

## 2021-12-09 PROCEDURE — 99233 SBSQ HOSP IP/OBS HIGH 50: CPT

## 2021-12-09 PROCEDURE — 74018 RADEX ABDOMEN 1 VIEW: CPT | Mod: 26

## 2021-12-09 RX ORDER — MINERAL OIL
133 OIL (ML) MISCELLANEOUS ONCE
Refills: 0 | Status: COMPLETED | OUTPATIENT
Start: 2021-12-09 | End: 2021-12-09

## 2021-12-09 RX ORDER — FLUCONAZOLE 150 MG/1
400 TABLET ORAL DAILY
Refills: 0 | Status: DISCONTINUED | OUTPATIENT
Start: 2021-12-09 | End: 2021-12-12

## 2021-12-09 RX ORDER — ONDANSETRON 8 MG/1
4 TABLET, FILM COATED ORAL EVERY 4 HOURS
Refills: 0 | Status: DISCONTINUED | OUTPATIENT
Start: 2021-12-09 | End: 2021-12-12

## 2021-12-09 RX ORDER — FILGRASTIM 480MCG/1.6
400 VIAL (ML) INJECTION ONCE
Refills: 0 | Status: COMPLETED | OUTPATIENT
Start: 2021-12-09 | End: 2021-12-09

## 2021-12-09 RX ORDER — FLUCONAZOLE 150 MG/1
400 TABLET ORAL
Refills: 0 | Status: DISCONTINUED | OUTPATIENT
Start: 2021-12-09 | End: 2021-12-09

## 2021-12-09 RX ORDER — LOSARTAN POTASSIUM 100 MG/1
100 TABLET, FILM COATED ORAL DAILY
Refills: 0 | Status: DISCONTINUED | OUTPATIENT
Start: 2021-12-09 | End: 2021-12-12

## 2021-12-09 RX ADMIN — ATORVASTATIN CALCIUM 80 MILLIGRAM(S): 80 TABLET, FILM COATED ORAL at 21:26

## 2021-12-09 RX ADMIN — FLUCONAZOLE 100 MILLIGRAM(S): 150 TABLET ORAL at 11:26

## 2021-12-09 RX ADMIN — CEFEPIME 100 MILLIGRAM(S): 1 INJECTION, POWDER, FOR SOLUTION INTRAMUSCULAR; INTRAVENOUS at 06:29

## 2021-12-09 RX ADMIN — LOSARTAN POTASSIUM 100 MILLIGRAM(S): 100 TABLET, FILM COATED ORAL at 17:18

## 2021-12-09 RX ADMIN — DULOXETINE HYDROCHLORIDE 60 MILLIGRAM(S): 30 CAPSULE, DELAYED RELEASE ORAL at 11:26

## 2021-12-09 RX ADMIN — Medication 25 MILLIGRAM(S): at 21:26

## 2021-12-09 RX ADMIN — VALACYCLOVIR 1000 MILLIGRAM(S): 500 TABLET, FILM COATED ORAL at 06:29

## 2021-12-09 RX ADMIN — BUPROPION HYDROCHLORIDE 300 MILLIGRAM(S): 150 TABLET, EXTENDED RELEASE ORAL at 11:26

## 2021-12-09 RX ADMIN — ONDANSETRON 4 MILLIGRAM(S): 8 TABLET, FILM COATED ORAL at 08:58

## 2021-12-09 RX ADMIN — ENOXAPARIN SODIUM 40 MILLIGRAM(S): 100 INJECTION SUBCUTANEOUS at 11:26

## 2021-12-09 RX ADMIN — Medication 0.25 MILLIGRAM(S): at 21:26

## 2021-12-09 RX ADMIN — ONDANSETRON 4 MILLIGRAM(S): 8 TABLET, FILM COATED ORAL at 15:13

## 2021-12-09 RX ADMIN — Medication 400 MICROGRAM(S): at 23:17

## 2021-12-09 RX ADMIN — PANTOPRAZOLE SODIUM 40 MILLIGRAM(S): 20 TABLET, DELAYED RELEASE ORAL at 07:57

## 2021-12-09 NOTE — CONSULT NOTE ADULT - CONSULT REASON
candida esophagitis
neytropenia
Please evaluate and advise for neutropenic/immunocompromised patient with possible sepsis and candidiasis

## 2021-12-09 NOTE — CONSULT NOTE ADULT - ASSESSMENT
DCIS 2018 sp radiation and on femara which is not chemo and doesnot cause leukopenia   pt now has leukopenia and neutropenia  etiology not clear hb and plt countisok   one dose of GCSF given will moniter  severe esophageal candidiasis  again etiology NOT CLEAR  PT ia on iv antifungal now   ct abdomen is done   suggest ct chest with contrast to look more for breast and esophagus   will follow with you     gabriela sethi MD /DR RICHARDS   [phone]

## 2021-12-09 NOTE — PROGRESS NOTE ADULT - ASSESSMENT
ASSESSMENT  71 year old female with PMH of IBS, pancreatitis, Breast Ca s/p radiation on Femara and Letrozole presented to the hospital after being sent in for Dr. Babin for severe candida esophagitis with persistent nausea, vomiting and diarrhea for over two weeks. Patient also has multiple small red lesions on her right buttock.     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

## 2021-12-09 NOTE — CONSULT NOTE ADULT - SUBJECTIVE AND OBJECTIVE BOX
c/o nausea vomiting ,inability to take solids for few weeks now after [place] trip in november   had EGD DONE SHOWS SEV ESOPHAGITIS WITH CANDIDIASIS   was sent by GI FOR IV ANTIFUNGAL as po wa not helping   has no appetite   abdomen is full but non tender   under lt breast lump of scar tissue from prevous radiation in 2018 after lumpectomy

## 2021-12-09 NOTE — PHARMACOTHERAPY INTERVENTION NOTE - COMMENTS
Fluconazole 400mg IV twice a day. Spoke to a prescriber md regarding frequency. Md stated that recommendation was done by GI team. However, I insisted to double check with ID team who was on board as well. Fluconazole is never given twice daily. Eventually, the order was changed to the right frequency- daily.

## 2021-12-09 NOTE — PROGRESS NOTE ADULT - ASSESSMENT
71 year old female with PMH of IBS, pancreatitis, Breast Ca s/p radiation on Femara presented to the hospital after being sent in for Dr. Babin for severe александр esophagitis.     #Severe candida esophagitis   #Sepsis present on admission likely secondary to esophagitis  #HAGMA likely secondary to lactic acidosis   - Patient is Severely neutropenia ANC <500, hypothermic, tachycardic, with a lactic acidosis on admission   - was on PO diflucan ( since 12/4) with no symptomatic improvement   - EGD: 12/3 -> sever candida esophagitis (confirmed on biopsy), HP neg  - s/p 2L IVF, cefepime, and flagyl in the ED   - ID recs appreciated   - Continue Cefepime and fluconazole  - If Blood Cx is negative will d/c cefepime (Pending)    - Discontinued flagyl- C. diff/Stool PCR likely negative due to negative test on 12/1  - GI consult appreciated: Start IV diflucan (double dose) 400 mg BID as did not respond to PO regular dose (Pharmacy will not approve the dose BID, half life of diflucan is 30hrs)  - hem/onc eval for leukopenia (on letrozole, dose not cause leukopenia)  - HIV non reactive   - F/u stool studies (C. Diff PCR, acid fast, culture, ova and parasites),   - F/u blood cultures, urine cultures, procal, fungitell   - Lactate trending down  - obtain MRSA nares  - ID consult     #Breast Cancer  #Leukopenia possibly secondary to breast Ca management  - neutropenia and leukopenia are possible side effects of femara   - ANC 0.16 >   - trend ANC  - heme/onc eval (follows with Dr. Mendoza)     #HTN - hold losartan 100mg PO daily for now, if hypertensive can restart   #HLD - continue with atorvastatin while inpatient   #Depression - continue with wellbutrin xL 300mg PO daily   #Fibromyalgia- continue with duloxetine 60mg PO daily   #GERD - continue with protonix for now     # DVT ppx: HSQ   # GI ppx: protonix   # Activity: IAT  # Diet: DASH Puree as tolerated    # Dispo: acute, from home   # Code status: discussed with patient at bedside, full code.  71 year old female with PMH of IBS, pancreatitis, Breast Ca s/p radiation on Femara presented to the hospital after being sent in for Dr. Babin for severe александр esophagitis.     #Severe candida esophagitis   #Sepsis present on admission likely secondary to esophagitis  #HAGMA likely secondary to lactic acidosis   - Patient is Severely neutropenia ANC <500, hypothermic, tachycardic, with a lactic acidosis on admission   - was on PO diflucan ( since 12/4) with no symptomatic improvement   - EGD: 12/3 -> sever candida esophagitis (confirmed on biopsy), HP neg  - s/p 2L IVF, cefepime, and flagyl in the ED   - ID recs appreciated   - Blood Cx is negative d/c cefepime   - Discontinued flagyl- C. 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 Diff PCR, acid fast, culture, ova and parasites),   - F/u blood cultures, urine cultures, procal, fungitell   - Lactate trending down  - F/u MRSA nares    #Breast Cancer  #Leukopenia possibly secondary to breast Ca management  - neutropenia and leukopenia are possible side effects of femara   - ANC 0.16 >   - trend ANC  - heme/onc eval (follows with Dr. Mendoza)     #HTN   - losartan 100mg PO daily for now, if hypertensive can restart     #HLD  - continue with atorvastatin while inpatient     #Depression  - continue with wellbutrin xL 300mg PO daily     #Fibromyalgia  - continue with duloxetine 60mg PO daily     #Misc   - DVT ppx: HSQ   - GI ppx: protonix   - Activity: IAT  - Diet: DASH Puree as tolerated    - Dispo: acute, from home   - Code status: full code 71 year old female with PMH of IBS, pancreatitis, Breast Ca s/p radiation on Femara presented to the hospital after being sent in for Dr. Babin for severe александр esophagitis.     #Severe candida esophagitis   #Sepsis present on admission likely secondary to esophagitis  #HAGMA likely secondary to lactic acidosis   - Patient is Severely neutropenia ANC <500, hypothermic, tachycardic, with a lactic acidosis on admission   - was on PO diflucan ( since 12/4) with no symptomatic improvement   - EGD: 12/3 -> sever candida esophagitis (confirmed on biopsy), HP neg  - s/p 2L IVF, cefepime, and flagyl in the ED   - ID recs appreciated   - Blood Cx is negative d/c cefepime   - Discontinued flagyl- C. 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 Diff PCR, acid fast, culture, ova and parasites),   - Blood cultures NG   - F/u urine cultures, procal, fungitell   - Lactate trending down  - F/u MRSA nares    #Breast Cancer  #Leukopenia possibly secondary to breast Ca management  - neutropenia and leukopenia are possible side effects of femara   - ANC 0.16 >   - trend ANC  - heme/onc eval (follows with Dr. Mendoza)     #HTN   - losartan 100mg PO daily for now, if hypertensive can restart     #HLD  - continue with atorvastatin while inpatient     #Depression  - continue with wellbutrin xL 300mg PO daily     #Fibromyalgia  - continue with duloxetine 60mg PO daily     #Misc   - DVT ppx: HSQ   - GI ppx: protonix   - Activity: IAT  - Diet: DASH Puree as tolerated    - Dispo: acute, from home   - Code status: full code 71 year old female with PMH of IBS, pancreatitis, Breast Ca s/p radiation on Femara presented to the hospital after being sent in for Dr. Babin for severe александр esophagitis.     #Severe candida esophagitis   #Sepsis present on admission likely secondary to esophagitis  #HAGMA likely secondary to lactic acidosis   - Patient is Severely neutropenia ANC <500, hypothermic, tachycardic, with a lactic acidosis on admission   - was on PO diflucan ( since 12/4) with no symptomatic improvement   - EGD: 12/3 -> sever candida esophagitis (confirmed on biopsy), HP neg  - s/p 2L IVF, cefepime, and flagyl in the ED   - ID recs appreciated   - Blood Cx is negative d/c cefepime   - Discontinued flagyl- C. 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 Diff PCR, acid fast, culture, ova and parasites),   - Blood cultures NG   - F/u urine cultures, procal, fungitell   - Lactate trending down  - F/u MRSA nares    #Breast Cancer  #Leukopenia possibly secondary to breast Ca management  - neutropenia and leukopenia are possible side effects of femara   - ANC 0.16 >   - trend ANC  - heme/onc eval (follows with Dr. Mendoza)   - Spoke to Dr. Tang, will give patient 1 time dose of 400mcg Neupogen     #HTN   - losartan 100mg PO daily for now, if hypertensive can restart     #HLD  - continue with atorvastatin while inpatient     #Depression  - continue with wellbutrin xL 300mg PO daily     #Fibromyalgia  - continue with duloxetine 60mg PO daily     #Misc   - DVT ppx: HSQ   - GI ppx: protonix   - Activity: IAT  - Diet: DASH Puree as tolerated    - Dispo: acute, from home   - Code status: full code

## 2021-12-09 NOTE — PROGRESS NOTE ADULT - ASSESSMENT
71 year old female with PMH of IBS-D ( Imodium, amitriptyline) , pancreatitis one time due to gravol as per patient, Breast Ca s/p radiation on Letrozole presented to the hospital for Nausea and lower abdominal pain relieved after she had BM.    # Sever candida esophagitis:  - was on PO diflucan ( since 12/4) with no symptomatic improvement   - EGD: 12/3--> sever candida esophagitis ( confirmed on biopsy), HP neg  - patient currently has leukopenia   - high lactate on admission 4.1 --> trended down after IV fluid  - can tolerate PO intake ( had pancake for breakfast)  -HIV negative    Rec:  - Start IV diflucan ( double dose) 400 mg BID as did not respond to PO regular dose for total 14 days ( can switch to PO on discharge)  - hem/onc eval for leukopenia ( on letrozole , dose not cause leukopenia)  - zofran PRN  - advance diet as tolerated  - follow up as outpatient with GI    #IBS-D:  - c/w amitriptyline  - hold off imodium for now ( did not have BM for 3-4 days and then had 1 soft BM yesterday )    #Persona history of Colon polyps:  - colonoscopy 10/29/21--> 3 polyps, 5-10mm, tubular adenoma    Rec:  - repeat colonoscopy in 3 years  71 year old female with PMH of IBS-D ( Imodium, amitriptyline) , pancreatitis one time due to gravol as per patient, Breast Ca s/p radiation on Letrozole presented to the hospital for Nausea and lower abdominal pain relieved after she had BM.    # Sever candida esophagitis:  - was on PO diflucan ( since 12/4) with no symptomatic improvement   - EGD: 12/3--> sever candida esophagitis ( confirmed on biopsy), HP neg  - patient currently has leukopenia   - high lactate on admission 4.1 --> trended down after IV fluid  - can tolerate PO intake ( had pancake for breakfast)  -HIV negative    Rec:  - c/w IV fluconazole, can switch to PO on discharge for total of 14 days   - hem/onc eval for leukopenia ( on letrozole , dose not cause leukopenia)  - zofran PRN  - advance diet as tolerated  - follow up as outpatient with GI    #IBS-D:  - c/w amitriptyline  - hold off imodium for now ( did not have BM for 3-4 days and then had 1 soft BM yesterday )    #Persona history of Colon polyps:  - colonoscopy 10/29/21--> 3 polyps, 5-10mm, tubular adenoma    Rec:  - repeat colonoscopy in 3 years  71 year old female with PMH of IBS-D ( Imodium, amitriptyline) , pancreatitis one time due to gravol as per patient, Breast Ca s/p radiation on Letrozole presented to the hospital for Nausea and lower abdominal pain relieved after she had BM.    # Sever candida esophagitis:  - was on PO diflucan ( since 12/4) with no symptomatic improvement   - EGD: 12/3--> sever candida esophagitis ( confirmed on biopsy), HP neg  - patient currently has leukopenia   - high lactate on admission 4.1 --> trended down after IV fluid  - can tolerate PO intake ( had pancake for breakfast)  -HIV negative    Rec:  - c/w IV fluconazole, can switch to PO on discharge for total of 21 days   - hem/onc eval for leukopenia ( on letrozole , dose not cause leukopenia)  - zofran PRN  - advance diet as tolerated  - follow up as outpatient with GI    #IBS-D:  - c/w amitriptyline  - hold off imodium for now ( did not have BM for 3-4 days and then had 1 soft BM yesterday )    #Persona history of Colon polyps:  - colonoscopy 10/29/21--> 3 polyps, 5-10mm, tubular adenoma    Rec:  - repeat colonoscopy in 3 years  71 year old female with PMH of IBS-D ( Imodium, amitriptyline) , pancreatitis one time due to gravol as per patient, Breast Ca s/p radiation on Letrozole presented to the hospital for Nausea and lower abdominal pain relieved after she had BM.    # Sever candida esophagitis:  - was on PO diflucan ( since 12/4) with no symptomatic improvement   - EGD: 12/3--> sever candida esophagitis ( confirmed on biopsy), HP neg  - patient currently has leukopenia   - high lactate on admission 4.1 --> trended down after IV fluid  - can tolerate PO intake ( had pancake for breakfast)  -HIV negative    Rec:  - c/w IV fluconazole, can switch to PO on discharge for total of 21 days   - hem/onc eval for leukopenia ( on letrozole , dose not cause leukopenia)  - zofran PRN  - advance diet as tolerated  - follow up as outpatient with GI    #IBS-D:  - c/w amitriptyline  - hold off imodium for now ( did not have BM for 3-4 days and then had 1 soft BM yesterday )    #Persona history of Colon polyps:  - colonoscopy 10/29/21--> 3 polyps, 5-10mm, tubular adenoma  Rec:  - repeat colonoscopy in 3 years

## 2021-12-10 ENCOUNTER — TRANSCRIPTION ENCOUNTER (OUTPATIENT)
Age: 71
End: 2021-12-10

## 2021-12-10 LAB
ALBUMIN SERPL ELPH-MCNC: 3.9 G/DL — SIGNIFICANT CHANGE UP (ref 3.5–5.2)
ALP SERPL-CCNC: 63 U/L — SIGNIFICANT CHANGE UP (ref 30–115)
ALT FLD-CCNC: 8 U/L — SIGNIFICANT CHANGE UP (ref 0–41)
ANION GAP SERPL CALC-SCNC: 17 MMOL/L — HIGH (ref 7–14)
AST SERPL-CCNC: 12 U/L — SIGNIFICANT CHANGE UP (ref 0–41)
BASOPHILS # BLD AUTO: 0.03 K/UL — SIGNIFICANT CHANGE UP (ref 0–0.2)
BASOPHILS NFR BLD AUTO: 0.4 % — SIGNIFICANT CHANGE UP (ref 0–1)
BILIRUB SERPL-MCNC: 0.5 MG/DL — SIGNIFICANT CHANGE UP (ref 0.2–1.2)
BUN SERPL-MCNC: 7 MG/DL — LOW (ref 10–20)
CALCIUM SERPL-MCNC: 9.6 MG/DL — SIGNIFICANT CHANGE UP (ref 8.5–10.1)
CHLORIDE SERPL-SCNC: 106 MMOL/L — SIGNIFICANT CHANGE UP (ref 98–110)
CO2 SERPL-SCNC: 20 MMOL/L — SIGNIFICANT CHANGE UP (ref 17–32)
CREAT SERPL-MCNC: 0.7 MG/DL — SIGNIFICANT CHANGE UP (ref 0.7–1.5)
CULTURE RESULTS: NO GROWTH — SIGNIFICANT CHANGE UP
EOSINOPHIL # BLD AUTO: 0.04 K/UL — SIGNIFICANT CHANGE UP (ref 0–0.7)
EOSINOPHIL NFR BLD AUTO: 0.5 % — SIGNIFICANT CHANGE UP (ref 0–8)
GLUCOSE SERPL-MCNC: 91 MG/DL — SIGNIFICANT CHANGE UP (ref 70–99)
HCT VFR BLD CALC: 38.6 % — SIGNIFICANT CHANGE UP (ref 37–47)
HGB BLD-MCNC: 12.7 G/DL — SIGNIFICANT CHANGE UP (ref 12–16)
IMM GRANULOCYTES NFR BLD AUTO: 0.5 % — HIGH (ref 0.1–0.3)
LYMPHOCYTES # BLD AUTO: 1.21 K/UL — SIGNIFICANT CHANGE UP (ref 1.2–3.4)
LYMPHOCYTES # BLD AUTO: 15.8 % — LOW (ref 20.5–51.1)
MAGNESIUM SERPL-MCNC: 1.9 MG/DL — SIGNIFICANT CHANGE UP (ref 1.8–2.4)
MCHC RBC-ENTMCNC: 30.8 PG — SIGNIFICANT CHANGE UP (ref 27–31)
MCHC RBC-ENTMCNC: 32.9 G/DL — SIGNIFICANT CHANGE UP (ref 32–37)
MCV RBC AUTO: 93.5 FL — SIGNIFICANT CHANGE UP (ref 81–99)
MONOCYTES # BLD AUTO: 0.83 K/UL — HIGH (ref 0.1–0.6)
MONOCYTES NFR BLD AUTO: 10.9 % — HIGH (ref 1.7–9.3)
NEUTROPHILS # BLD AUTO: 5.49 K/UL — SIGNIFICANT CHANGE UP (ref 1.4–6.5)
NEUTROPHILS NFR BLD AUTO: 71.9 % — SIGNIFICANT CHANGE UP (ref 42.2–75.2)
NIGHT BLUE STAIN TISS: SIGNIFICANT CHANGE UP
NRBC # BLD: 0 /100 WBCS — SIGNIFICANT CHANGE UP (ref 0–0)
PLATELET # BLD AUTO: 251 K/UL — SIGNIFICANT CHANGE UP (ref 130–400)
POTASSIUM SERPL-MCNC: 4.3 MMOL/L — SIGNIFICANT CHANGE UP (ref 3.5–5)
POTASSIUM SERPL-SCNC: 4.3 MMOL/L — SIGNIFICANT CHANGE UP (ref 3.5–5)
PROT SERPL-MCNC: 6.2 G/DL — SIGNIFICANT CHANGE UP (ref 6–8)
RBC # BLD: 4.13 M/UL — LOW (ref 4.2–5.4)
RBC # FLD: 13.6 % — SIGNIFICANT CHANGE UP (ref 11.5–14.5)
SODIUM SERPL-SCNC: 143 MMOL/L — SIGNIFICANT CHANGE UP (ref 135–146)
SPECIMEN SOURCE: SIGNIFICANT CHANGE UP
SPECIMEN SOURCE: SIGNIFICANT CHANGE UP
WBC # BLD: 7.66 K/UL — SIGNIFICANT CHANGE UP (ref 4.8–10.8)
WBC # FLD AUTO: 7.66 K/UL — SIGNIFICANT CHANGE UP (ref 4.8–10.8)

## 2021-12-10 PROCEDURE — 99233 SBSQ HOSP IP/OBS HIGH 50: CPT

## 2021-12-10 PROCEDURE — 71260 CT THORAX DX C+: CPT | Mod: 26

## 2021-12-10 RX ORDER — VALACYCLOVIR 500 MG/1
1 TABLET, FILM COATED ORAL
Qty: 30 | Refills: 0
Start: 2021-12-10 | End: 2022-01-08

## 2021-12-10 RX ORDER — POLYETHYLENE GLYCOL 3350 17 G/17G
17 POWDER, FOR SOLUTION ORAL DAILY
Refills: 0 | Status: DISCONTINUED | OUTPATIENT
Start: 2021-12-10 | End: 2021-12-12

## 2021-12-10 RX ORDER — ALBUTEROL 90 UG/1
2.5 AEROSOL, METERED ORAL ONCE
Refills: 0 | Status: COMPLETED | OUTPATIENT
Start: 2021-12-10 | End: 2021-12-10

## 2021-12-10 RX ORDER — HYDRALAZINE HCL 50 MG
25 TABLET ORAL
Refills: 0 | Status: DISCONTINUED | OUTPATIENT
Start: 2021-12-10 | End: 2021-12-12

## 2021-12-10 RX ORDER — HYDRALAZINE HCL 50 MG
10 TABLET ORAL ONCE
Refills: 0 | Status: COMPLETED | OUTPATIENT
Start: 2021-12-10 | End: 2021-12-10

## 2021-12-10 RX ORDER — SODIUM CHLORIDE 9 MG/ML
4 INJECTION INTRAMUSCULAR; INTRAVENOUS; SUBCUTANEOUS ONCE
Refills: 0 | Status: DISCONTINUED | OUTPATIENT
Start: 2021-12-10 | End: 2021-12-12

## 2021-12-10 RX ORDER — ALPRAZOLAM 0.25 MG
0.25 TABLET ORAL ONCE
Refills: 0 | Status: DISCONTINUED | OUTPATIENT
Start: 2021-12-10 | End: 2021-12-10

## 2021-12-10 RX ADMIN — Medication 0.25 MILLIGRAM(S): at 09:00

## 2021-12-10 RX ADMIN — BUPROPION HYDROCHLORIDE 300 MILLIGRAM(S): 150 TABLET, EXTENDED RELEASE ORAL at 11:16

## 2021-12-10 RX ADMIN — POLYETHYLENE GLYCOL 3350 17 GRAM(S): 17 POWDER, FOR SOLUTION ORAL at 16:44

## 2021-12-10 RX ADMIN — VALACYCLOVIR 1000 MILLIGRAM(S): 500 TABLET, FILM COATED ORAL at 05:12

## 2021-12-10 RX ADMIN — FLUCONAZOLE 100 MILLIGRAM(S): 150 TABLET ORAL at 11:17

## 2021-12-10 RX ADMIN — Medication 25 MILLIGRAM(S): at 21:08

## 2021-12-10 RX ADMIN — Medication 10 MILLIGRAM(S): at 17:07

## 2021-12-10 RX ADMIN — DULOXETINE HYDROCHLORIDE 60 MILLIGRAM(S): 30 CAPSULE, DELAYED RELEASE ORAL at 11:16

## 2021-12-10 RX ADMIN — Medication 0.25 MILLIGRAM(S): at 21:08

## 2021-12-10 RX ADMIN — Medication 25 MILLIGRAM(S): at 20:09

## 2021-12-10 RX ADMIN — LOSARTAN POTASSIUM 100 MILLIGRAM(S): 100 TABLET, FILM COATED ORAL at 05:10

## 2021-12-10 RX ADMIN — ENOXAPARIN SODIUM 40 MILLIGRAM(S): 100 INJECTION SUBCUTANEOUS at 11:16

## 2021-12-10 RX ADMIN — ATORVASTATIN CALCIUM 80 MILLIGRAM(S): 80 TABLET, FILM COATED ORAL at 21:08

## 2021-12-10 RX ADMIN — PANTOPRAZOLE SODIUM 40 MILLIGRAM(S): 20 TABLET, DELAYED RELEASE ORAL at 07:57

## 2021-12-10 NOTE — PROVIDER CONTACT NOTE (OTHER) - SITUATION
Per MD Liang and ID note from today, patient ren snot require r/o cdiff/ contact at this time. Contact precautions not yet removed from patient's chart.
Patient in no acute distress, BP is 172/88 hr 62. No antihypertensives on order at this time, however, ppatient states she takes losartan 100 mg q daily at home.
Patient's blood pressure is 180/90 manual at this time (168/107 per electronic reading). Patient denies pain, is resting comfortably at this time but endorses worries about POC.
Patient BP is 181/98 hr 58 at this time. Patient denies pain and is in no acute distress resting comfortably in bed. Patient endorsed Losartan 100 mg q daily per home medication not on order.

## 2021-12-10 NOTE — PROGRESS NOTE ADULT - ASSESSMENT
71 year old female with PMH of IBS, pancreatitis, Breast Ca s/p radiation on Femara presented to the hospital after being sent in for Dr. Babin for severe александр esophagitis.     #Severe candida esophagitis   #Sepsis present on admission likely secondary to esophagitis  #HAGMA likely secondary to lactic acidosis   - Patient is Severely neutropenia ANC <500, hypothermic, tachycardic, with a lactic acidosis on admission   - was on PO diflucan ( since 12/4) with no symptomatic improvement   - EGD: 12/3 -> sever candida esophagitis (confirmed on biopsy), HP neg  - s/p 2L IVF, cefepime, and flagyl in the ED   - ID recs appreciated   - Blood Cx is negative d/c cefepime   - Discontinued flagyl- C. 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 Diff PCR, acid fast, culture, ova and parasites),   - Blood cultures NG   - UA negative   - F/u urine cultures, procal, fungitell   - Lactate trending down  - MRSA nares negative     #Breast Cancer  #Leukopenia possibly secondary to breast Ca management  - neutropenia and leukopenia   - ANC 0.16 >   - trend ANC  - heme/onc eval (follows with Dr. Mendoza)   - Spoke to Dr. Tang,   - Patient received 1 time dose of 400mcg Neupogen   - f/u CT chest     #HTN   - c/w losartan 100mg PO daily     #HLD  - continue with atorvastatin while inpatient     #Depression  - continue with wellbutrin xL 300mg PO daily     #Fibromyalgia  - continue with duloxetine 60mg PO daily     #Misc   - DVT ppx: HSQ   - GI ppx: protonix   - Activity: IAT  - Diet: DASH Puree as tolerated    - Dispo: acute, from home   - Code status: full code  pending: CT chest

## 2021-12-10 NOTE — PROGRESS NOTE ADULT - ASSESSMENT
pt wbc count better now s/p 1 dose of GCSF  ct chest still pending   will follow     gabriela sethi MD

## 2021-12-10 NOTE — DISCHARGE NOTE PROVIDER - PROVIDER TOKENS
PROVIDER:[TOKEN:[[id number]],FOLLOWUP:[1 week],ESTABLISHEDPATIENT:[T]],PROVIDER:[TOKEN:[[id number]],FOLLOWUP:[2 weeks],ESTABLISHEDPATIENT:[T]],PROVIDER:[TOKEN:[[id number]],FOLLOWUP:[2 weeks],ESTABLISHEDPATIENT:[T]] PROVIDER:[TOKEN:[[id number]],FOLLOWUP:[1 week],ESTABLISHEDPATIENT:[T]],PROVIDER:[TOKEN:[[id number]],FOLLOWUP:[2 weeks],ESTABLISHEDPATIENT:[T]],PROVIDER:[TOKEN:[[id number]],FOLLOWUP:[2 weeks],ESTABLISHEDPATIENT:[T]],PROVIDER:[TOKEN:[[id number]],FOLLOWUP:[1 week],ESTABLISHEDPATIENT:[T]]

## 2021-12-10 NOTE — PROGRESS NOTE ADULT - TIME BILLING
I have personally seen and examined this patient.  I have reviewed all pertinent clinical information and reviewed all relevant imaging and diagnostic studies personally.   I counseled the patient about diagnostic testing and treatment plan. All questions were answered.  I discussed recommendations with the primary team.
as above
Coordination of care
Coordination of care

## 2021-12-10 NOTE — PROVIDER CONTACT NOTE (OTHER) - ACTION/TREATMENT ORDERED:
Ok, I will look into this and place orders. TY
We are purposely holding her antihypertensives. Please reassess VS per routine.
Ok, I will place an order.
Ok, I will place the order. TY

## 2021-12-10 NOTE — DISCHARGE NOTE PROVIDER - CARE PROVIDER_API CALL
Franchesca Harvey)  Hematology; Internal Medicine; Medical Oncology  [address] [place] [address]  Phone: [phone]  Fax: [phone]  Established Patient  Follow Up Time: 1 week    Thomas Yang  Gastroenterology  [address] [place] [address]  Phone: [phone]  Fax: [phone]  Established Patient  Follow Up Time: 2 weeks    [facility] [address] [address]  Phone: [phone]  Fax: [phone]  Established Patient  Follow Up Time: 2 weeks   Franchesca Harvey)  Hematology; Internal Medicine; Medical Oncology  [address] [place] [address]  Phone: [phone]  Fax: [phone]  Established Patient  Follow Up Time: 1 week    Thomas Yang  Gastroenterology  [address] [place] [address]  Phone: [phone]  Fax: [phone]  Established Patient  Follow Up Time: 2 weeks    [facility] [address] [address]  Phone: [phone]  Fax: [phone]  Established Patient  Follow Up Time: 2 weeks    Gerri Tang  INTERNAL MEDICINE  [address] [place] [address]  Phone: [phone]  Fax: [phone]  Established Patient  Follow Up Time: 1 week

## 2021-12-10 NOTE — DISCHARGE NOTE PROVIDER - NSDCFUADDAPPT_GEN_ALL_CORE_FT
- follow up with Dr. Guzman after discharge  - follow up with Dr. Guzman or Dr. Tang after discharge   - Follow up with your primary care provider for management of blood pressure

## 2021-12-10 NOTE — DISCHARGE NOTE PROVIDER - NSDCCPCAREPLAN_GEN_ALL_CORE_FT
PRINCIPAL DISCHARGE DIAGNOSIS  Diagnosis: Esophageal candidiasis  Assessment and Plan of Treatment: Esophageal candidiasis is a fungal infection that affects the inside of your esophagus. You are being treated with antifungal medication. Please seek help if you have trouble swallowing and your jaw and neck are stiff. If you feel dizzy, thirsty, or have a dry mouth. If you have any fever.   Follow up with the GI team as outpatient.      SECONDARY DISCHARGE DIAGNOSES  Diagnosis: Neutropenia  Assessment and Plan of Treatment: Neutropenia is a condition that causes you to have a low number of neutrophils in your blood. Neutrophils are type of white blood cell made in the bone marrow. They help your body fight infection and bacteria.   please seek help from you primary care provider if you have any fever or chills. If you have a new cough, if you have sore throat or any new mouth sores.

## 2021-12-10 NOTE — DISCHARGE NOTE PROVIDER - HOSPITAL COURSE
71 year old female with PMH of IBS, pancreatitis, Breast Ca s/p radiation on Femara presented to the hospital after being sent in for Dr. Babin for severe александр esophagitis.     #Severe candida esophagitis   #Sepsis present on admission likely secondary to esophagitis  #HAGMA likely secondary to lactic acidosis   - Patient is Severely neutropenia ANC <500, hypothermic, tachycardic, with a lactic acidosis on admission   - was on PO diflucan ( since 12/4) with no symptomatic improvement   - EGD: 12/3 -> sever candida esophagitis (confirmed on biopsy), HP neg  - s/p 2L IVF, cefepime, and flagyl in the ED   - ID recs appreciated   - Blood Cx is negative d/c cefepime   - Discontinued flagyl- C. 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 Diff PCR, acid fast, culture, ova and parasites),   - Blood cultures NG   - UA negative   - F/u urine cultures, procal, fungitell   - Lactate trending down  - MRSA nares negative     #Breast Cancer  #Leukopenia possibly secondary to breast Ca management  - neutropenia and leukopenia   - ANC 0.16 >   - trend ANC  - heme/onc eval (follows with Dr. Mendoza)   - Spoke to Dr. Tang,   - Patient received 1 time dose of 400mcg Neupogen   - f/u CT chest     #HTN   - c/w losartan 100mg PO daily     #HLD  - continue with atorvastatin while inpatient     #Depression  - continue with wellbutrin xL 300mg PO daily     #Fibromyalgia  - continue with duloxetine 60mg PO daily      71 year old female with PMH of IBS, pancreatitis, Breast Ca s/p radiation on Femara presented to the hospital after being sent in for Dr. Babin for severe александр esophagitis.     #Severe candida esophagitis   #Sepsis present on admission likely secondary to esophagitis  #HAGMA likely secondary to lactic acidosis   - Patient is Severely neutropenia ANC <500, hypothermic, tachycardic, with a lactic acidosis on admission   - was on PO diflucan ( since 12/4) with no symptomatic improvement   - EGD: 12/3 -> sever candida esophagitis (confirmed on biopsy), HP neg  - s/p 2L IVF, cefepime, and flagyl in the ED   - ID recs appreciated   - Blood Cx is negative d/c cefepime   - Discontinued flagyl- C. 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 Mendoza)   - Spoke to Dr. Tang,   - Patient received 1 time dose of 400mcg Neupogen   - CT chest noted, patient will follow up with heme/onc as outpatient     #HTN   - c/w losartan 100mg PO daily   - added hydralazine     #HLD  - continue with atorvastatin while inpatient     #Depression  - continue with wellbutrin xL 300mg PO daily     #Fibromyalgia  - continue with duloxetine 60mg PO daily     #Misc   - DVT ppx: HSQ   - GI ppx: protonix   - Activity: IAT  - Diet: DASH Puree as tolerated    - Dispo: acute, from home   - Code status: full code  pending: discharge planning 71 year old female with PMH of IBS, pancreatitis, Breast Ca s/p radiation on Femara presented to the hospital after being sent in for Dr. Babin for severe александр esophagitis.     #Severe candida esophagitis   #Sepsis present on admission likely secondary to esophagitis  #HAGMA likely secondary to lactic acidosis   - Patient is Severely neutropenia ANC <500, hypothermic, tachycardic, with a lactic acidosis on admission   - was on PO diflucan ( since 12/4) with no symptomatic improvement   - EGD: 12/3 -> sever candida esophagitis (confirmed on biopsy), HP neg  - s/p 2L IVF, cefepime, and flagyl in the ED   - ID recs appreciated   - Blood Cx is negative d/c cefepime   - Discontinued flagyl- C. 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 Mendoza)   - Spoke to Dr. Tang,   - Patient received 1 time dose of 400mcg Neupogen   - CT chest noted, patient will follow up with heme/onc as outpatient     #HTN   - c/w losartan 100mg PO daily   - added hydralazine     #HLD  - continue with atorvastatin while inpatient     #Depression  - continue with wellbutrin xL 300mg PO daily     #Fibromyalgia  - continue with duloxetine 60mg PO daily       # constipation-- given enema-- feels better

## 2021-12-10 NOTE — DISCHARGE NOTE PROVIDER - NSDCMRMEDTOKEN_GEN_ALL_CORE_FT
amitriptyline 25 mg oral tablet: 1 tab(s) orally once a day (at bedtime)  DULoxetine 60 mg oral delayed release capsule: 1 cap(s) orally once a day  Femara 2.5 mg oral tablet: 1 tab(s) orally once a day  Imodium 2 mg oral capsule: 1 cap(s) orally every 4 hours, As Needed  losartan 100 mg oral tablet: 1 tab(s) orally once a day  omeprazole 40 mg oral delayed release capsule: 1 cap(s) orally once a day  rosuvastatin 20 mg oral tablet: 1 tab(s) orally once a day  valACYclovir 1 g oral tablet: 1 tab(s) orally every 24 hours  Wellbutrin  mg/24 hours oral tablet, extended release: 1 tab(s) orally every 24 hours  Xanax 0.25 mg oral tablet: 1 tab(s) orally once a day (at bedtime), As Needed   amitriptyline 25 mg oral tablet: 1 tab(s) orally once a day (at bedtime)  DULoxetine 60 mg oral delayed release capsule: 1 cap(s) orally once a day  Femara 2.5 mg oral tablet: 1 tab(s) orally once a day  hydrALAZINE 25 mg oral tablet: 1 tab(s) orally 2 times a day  Imodium 2 mg oral capsule: 1 cap(s) orally every 4 hours, As Needed  losartan 100 mg oral tablet: 1 tab(s) orally once a day  omeprazole 40 mg oral delayed release capsule: 1 cap(s) orally once a day  rosuvastatin 20 mg oral tablet: 1 tab(s) orally once a day  valACYclovir 1 g oral tablet: 1 tab(s) orally every 24 hours  Wellbutrin  mg/24 hours oral tablet, extended release: 1 tab(s) orally every 24 hours  Xanax 0.25 mg oral tablet: 1 tab(s) orally once a day (at bedtime), As Needed   amitriptyline 25 mg oral tablet: 1 tab(s) orally once a day (at bedtime)  Diflucan 200 mg oral tablet: 1 tab(s) orally once a day   DULoxetine 60 mg oral delayed release capsule: 1 cap(s) orally once a day  Femara 2.5 mg oral tablet: 1 tab(s) orally once a day  hydrALAZINE 25 mg oral tablet: 1 tab(s) orally 2 times a day  Imodium 2 mg oral capsule: 1 cap(s) orally every 4 hours, As Needed  losartan 100 mg oral tablet: 1 tab(s) orally once a day  omeprazole 40 mg oral delayed release capsule: 1 cap(s) orally once a day  rosuvastatin 20 mg oral tablet: 1 tab(s) orally once a day  valACYclovir 1 g oral tablet: 1 tab(s) orally every 24 hours  Wellbutrin  mg/24 hours oral tablet, extended release: 1 tab(s) orally every 24 hours  Xanax 0.25 mg oral tablet: 1 tab(s) orally once a day (at bedtime), As Needed   amitriptyline 25 mg oral tablet: 1 tab(s) orally once a day (at bedtime)  Diflucan 200 mg oral tablet: 1 tab(s) orally once a day   DULoxetine 60 mg oral delayed release capsule: 1 cap(s) orally once a day  Femara 2.5 mg oral tablet: 1 tab(s) orally once a day  hydrALAZINE 25 mg oral tablet: 1 tab(s) orally 2 times a day  Imodium 2 mg oral capsule: 1 cap(s) orally once a day, As Needed  losartan 100 mg oral tablet: 1 tab(s) orally once a day  omeprazole 40 mg oral delayed release capsule: 1 cap(s) orally once a day  rosuvastatin 20 mg oral tablet: 1 tab(s) orally once a day  valACYclovir 1 g oral tablet: 1 tab(s) orally every 24 hours  Wellbutrin  mg/24 hours oral tablet, extended release: 1 tab(s) orally every 24 hours  Xanax 0.25 mg oral tablet: 1 tab(s) orally once a day (at bedtime), As Needed

## 2021-12-10 NOTE — PROGRESS NOTE ADULT - ASSESSMENT
71 year old female with PMH of IBS-D ( Imodium, amitriptyline) , pancreatitis one time due to gravol as per patient, Breast Ca s/p radiation on Letrozole presented to the hospital for Nausea and lower abdominal pain relieved after she had BM.    # Sever candida esophagitis:  - was on PO diflucan ( since 12/4) with no symptomatic improvement   - EGD: 12/3--> sever candida esophagitis ( confirmed on biopsy), HP neg  - patient currently has leukopenia   - high lactate on admission 4.1 --> trended down after IV fluid  - can tolerate PO intake ( had pancake for breakfast)  -HIV negative    Rec:  - c/w IV fluconazole, can switch to PO on discharge for total of 21 days   - hem/onc follow up for leukopenia ( on letrozole , dose not cause leukopenia)  - zofran PRN  - advance diet as tolerated  - follow up as outpatient with GI    #IBS-D:  - c/w amitriptyline  - hold off imodium for now ( did not have BM for 3-4 days and then had 1 soft BM yesterday )    #Persona history of Colon polyps:  - colonoscopy 10/29/21--> 3 polyps, 5-10mm, tubular adenoma    Rec:  Repeat colonoscopy in 3 years    Please notify GI for any further questions    - Please call GI [phone] or MS teams during weekdays till 5pm or call [phone] after 5 PM on weekdays and weekends  - Follow up with our [facility] located at [address] [place] [address]. Phone Number: [phone]

## 2021-12-10 NOTE — DISCHARGE NOTE PROVIDER - CARE PROVIDERS DIRECT ADDRESSES
[email or web address],DirectAddress_Unknown,DirectAddress_Unknown

## 2021-12-11 LAB
ALBUMIN SERPL ELPH-MCNC: 4.1 G/DL — SIGNIFICANT CHANGE UP (ref 3.5–5.2)
ALP SERPL-CCNC: 99 U/L — SIGNIFICANT CHANGE UP (ref 30–115)
ALT FLD-CCNC: 11 U/L — SIGNIFICANT CHANGE UP (ref 0–41)
ANION GAP SERPL CALC-SCNC: 19 MMOL/L — HIGH (ref 7–14)
AST SERPL-CCNC: 23 U/L — SIGNIFICANT CHANGE UP (ref 0–41)
BASOPHILS # BLD AUTO: 0.07 K/UL — SIGNIFICANT CHANGE UP (ref 0–0.2)
BASOPHILS NFR BLD AUTO: 0.4 % — SIGNIFICANT CHANGE UP (ref 0–1)
BILIRUB SERPL-MCNC: 0.6 MG/DL — SIGNIFICANT CHANGE UP (ref 0.2–1.2)
BUN SERPL-MCNC: 7 MG/DL — LOW (ref 10–20)
CALCIUM SERPL-MCNC: 9.8 MG/DL — SIGNIFICANT CHANGE UP (ref 8.5–10.1)
CHLORIDE SERPL-SCNC: 102 MMOL/L — SIGNIFICANT CHANGE UP (ref 98–110)
CO2 SERPL-SCNC: 18 MMOL/L — SIGNIFICANT CHANGE UP (ref 17–32)
CREAT SERPL-MCNC: 0.8 MG/DL — SIGNIFICANT CHANGE UP (ref 0.7–1.5)
CULTURE RESULTS: SIGNIFICANT CHANGE UP
EOSINOPHIL # BLD AUTO: 0.07 K/UL — SIGNIFICANT CHANGE UP (ref 0–0.7)
EOSINOPHIL NFR BLD AUTO: 0.4 % — SIGNIFICANT CHANGE UP (ref 0–8)
GLUCOSE SERPL-MCNC: 89 MG/DL — SIGNIFICANT CHANGE UP (ref 70–99)
HCT VFR BLD CALC: 43.1 % — SIGNIFICANT CHANGE UP (ref 37–47)
HGB BLD-MCNC: 14.3 G/DL — SIGNIFICANT CHANGE UP (ref 12–16)
IMM GRANULOCYTES NFR BLD AUTO: 1.7 % — HIGH (ref 0.1–0.3)
LYMPHOCYTES # BLD AUTO: 12.3 % — LOW (ref 20.5–51.1)
LYMPHOCYTES # BLD AUTO: 2.33 K/UL — SIGNIFICANT CHANGE UP (ref 1.2–3.4)
MAGNESIUM SERPL-MCNC: 1.8 MG/DL — SIGNIFICANT CHANGE UP (ref 1.8–2.4)
MCHC RBC-ENTMCNC: 30.9 PG — SIGNIFICANT CHANGE UP (ref 27–31)
MCHC RBC-ENTMCNC: 33.2 G/DL — SIGNIFICANT CHANGE UP (ref 32–37)
MCV RBC AUTO: 93.1 FL — SIGNIFICANT CHANGE UP (ref 81–99)
MONOCYTES # BLD AUTO: 0.94 K/UL — HIGH (ref 0.1–0.6)
MONOCYTES NFR BLD AUTO: 5 % — SIGNIFICANT CHANGE UP (ref 1.7–9.3)
NEUTROPHILS # BLD AUTO: 15.19 K/UL — HIGH (ref 1.4–6.5)
NEUTROPHILS NFR BLD AUTO: 80.2 % — HIGH (ref 42.2–75.2)
NRBC # BLD: 0 /100 WBCS — SIGNIFICANT CHANGE UP (ref 0–0)
PLATELET # BLD AUTO: 283 K/UL — SIGNIFICANT CHANGE UP (ref 130–400)
POTASSIUM SERPL-MCNC: 5.3 MMOL/L — HIGH (ref 3.5–5)
POTASSIUM SERPL-SCNC: 5.3 MMOL/L — HIGH (ref 3.5–5)
PROT SERPL-MCNC: 6.5 G/DL — SIGNIFICANT CHANGE UP (ref 6–8)
RBC # BLD: 4.63 M/UL — SIGNIFICANT CHANGE UP (ref 4.2–5.4)
RBC # FLD: 14.2 % — SIGNIFICANT CHANGE UP (ref 11.5–14.5)
SODIUM SERPL-SCNC: 139 MMOL/L — SIGNIFICANT CHANGE UP (ref 135–146)
SPECIMEN SOURCE: SIGNIFICANT CHANGE UP
WBC # BLD: 18.93 K/UL — HIGH (ref 4.8–10.8)
WBC # FLD AUTO: 18.93 K/UL — HIGH (ref 4.8–10.8)

## 2021-12-11 PROCEDURE — 99233 SBSQ HOSP IP/OBS HIGH 50: CPT

## 2021-12-11 RX ORDER — KETOROLAC TROMETHAMINE 30 MG/ML
15 SYRINGE (ML) INJECTION ONCE
Refills: 0 | Status: DISCONTINUED | OUTPATIENT
Start: 2021-12-11 | End: 2021-12-11

## 2021-12-11 RX ORDER — ACETAMINOPHEN 500 MG
650 TABLET ORAL EVERY 6 HOURS
Refills: 0 | Status: DISCONTINUED | OUTPATIENT
Start: 2021-12-11 | End: 2021-12-12

## 2021-12-11 RX ORDER — HYDRALAZINE HCL 50 MG
1 TABLET ORAL
Qty: 60 | Refills: 0
Start: 2021-12-11 | End: 2022-01-09

## 2021-12-11 RX ORDER — FLUCONAZOLE 150 MG/1
1 TABLET ORAL
Qty: 21 | Refills: 0
Start: 2021-12-11 | End: 2021-12-31

## 2021-12-11 RX ADMIN — DULOXETINE HYDROCHLORIDE 60 MILLIGRAM(S): 30 CAPSULE, DELAYED RELEASE ORAL at 11:23

## 2021-12-11 RX ADMIN — ONDANSETRON 4 MILLIGRAM(S): 8 TABLET, FILM COATED ORAL at 08:41

## 2021-12-11 RX ADMIN — ONDANSETRON 4 MILLIGRAM(S): 8 TABLET, FILM COATED ORAL at 14:17

## 2021-12-11 RX ADMIN — Medication 650 MILLIGRAM(S): at 03:09

## 2021-12-11 RX ADMIN — ENOXAPARIN SODIUM 40 MILLIGRAM(S): 100 INJECTION SUBCUTANEOUS at 11:24

## 2021-12-11 RX ADMIN — ATORVASTATIN CALCIUM 80 MILLIGRAM(S): 80 TABLET, FILM COATED ORAL at 21:33

## 2021-12-11 RX ADMIN — ONDANSETRON 4 MILLIGRAM(S): 8 TABLET, FILM COATED ORAL at 20:21

## 2021-12-11 RX ADMIN — PANTOPRAZOLE SODIUM 40 MILLIGRAM(S): 20 TABLET, DELAYED RELEASE ORAL at 05:56

## 2021-12-11 RX ADMIN — Medication 0.25 MILLIGRAM(S): at 21:33

## 2021-12-11 RX ADMIN — FLUCONAZOLE 100 MILLIGRAM(S): 150 TABLET ORAL at 11:23

## 2021-12-11 RX ADMIN — VALACYCLOVIR 1000 MILLIGRAM(S): 500 TABLET, FILM COATED ORAL at 05:56

## 2021-12-11 RX ADMIN — Medication 25 MILLIGRAM(S): at 21:33

## 2021-12-11 RX ADMIN — Medication 15 MILLIGRAM(S): at 11:24

## 2021-12-11 RX ADMIN — POLYETHYLENE GLYCOL 3350 17 GRAM(S): 17 POWDER, FOR SOLUTION ORAL at 11:23

## 2021-12-11 RX ADMIN — Medication 25 MILLIGRAM(S): at 17:25

## 2021-12-11 RX ADMIN — BUPROPION HYDROCHLORIDE 300 MILLIGRAM(S): 150 TABLET, EXTENDED RELEASE ORAL at 11:23

## 2021-12-11 RX ADMIN — Medication 650 MILLIGRAM(S): at 03:40

## 2021-12-11 RX ADMIN — LOSARTAN POTASSIUM 100 MILLIGRAM(S): 100 TABLET, FILM COATED ORAL at 05:56

## 2021-12-11 RX ADMIN — Medication 25 MILLIGRAM(S): at 05:56

## 2021-12-11 NOTE — PROGRESS NOTE ADULT - ATTENDING COMMENTS
71 year old female with PMH of IBS, pancreatitis, Breast Ca s/p radiation on Femara presented to the hospital after being sent in for Dr. Babin for severe александр esophagitis.         1. 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  Hx of breast cancer--Neutropenia and leukopenia are possible side effects of femara, ANC 0.16 >, trend ANC, heme/onc eval (follows with Dr. Mendoza)  started on neupogen-- improved  CT chest with iv contrast today and bone marrow bx as outpatient.    3.  HTN--restart losartan 100mg PO daily,  started on hydralazine 25mg q12h  4. Fibromyalgia-- wellbutrin xL 300mg PO daily     5. duloxetine 60mg PO daily     6. protonix for now     DVT ppx: HSQ   GI ppx: protonix.  Dc planning weekend
71 year old female with PMH of IBS, pancreatitis, Breast Ca s/p radiation on Femara presented to the hospital after being sent in for Dr. Babin for severe александр esophagitis.         1. Candida esophagitis--was on PO diflucan ( since 12/4) with no symptomatic improvement, EGD: 12/3 -> sever candida esophagitis (confirmed on biopsy), HP neg  Patient hypothermic, tachycardic, leukopenic, with a lactic acidosis on admission, s/p 2L IVF, cefepime, and flagyl in the ED   c/w cefepime and flagyl for now, GI consult appreciated: Start IV diflucan (double dose) 400 mg BID as did not respond to PO regular dose  hem/onc eval for leukopenia (on letrozole, dose not cause leukopenia),  HIV negative  f/u stool studies (C. Diff PCR, acid fast, culture, ova and parasites),   f/u blood cultures, urine cultures, procal, fungitell   Lactate trending down    ID consult --DC cefepime    2.  Hx of breast cancer--Neutropenia and leukopenia are possible side effects of femara, ANC 0.16 >, trend ANC, heme/onc eval (follows with Dr. Mendoza)     3.  HTN--restart losartan 100mg PO daily for now  4. Fibromyalgia-- wellbutrin xL 300mg PO daily     5. duloxetine 60mg PO daily     6. protonix for now     DVT ppx: HSQ   GI ppx: protonix
71 year old female with PMH of IBS, pancreatitis, Breast Ca s/p radiation on Femara presented to the hospital after being sent in for Dr. Babin for severe александр esophagitis.         1. 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  Hx of breast cancer--Neutropenia and leukopenia are possible side effects of femara, ANC 0.16 >, trend ANC, heme/onc eval (follows with Dr. Mendoza)  started on neupogen-- improved  CT chest with iv contrast today and bone marrow bx as outpatient.    3.  HTN--restart losartan 100mg PO daily,  started on hydralazine 25mg q12h  4. Fibromyalgia-- wellbutrin xL 300mg PO daily and duloxetine 60mg PO daily       DVT ppx: HSQ   GI ppx: protonix.  Dc planning weekend .-- patient has migraine and is constipated-- DC plan AM
I edited the note
I edited the note
71 year old female with PMH of IBS, pancreatitis, Breast Ca s/p radiation on Femara presented to the hospital after being sent in for Dr. Babin for severe александр esophagitis.     1.Severe candida esophagitis, Sepsis present on admission likely secondary to esophagitis, HAGMA likely secondary to lactic acidosis   2.Breast Cancer, Leukopenia possibly secondary to breast Ca management  3.HTN/HLD  4.Depression  5.Fibromyalgia  6.GERD     1. was on PO diflucan ( since 12/4) with no symptomatic improvement, EGD: 12/3 -> sever candida esophagitis (confirmed on biopsy), HP neg  Patient hypothermic, tachycardic, leukopenic, with a lactic acidosis on admission, s/p 2L IVF, cefepime, and flagyl in the ED   c/w cefepime and flagyl for now, GI consult appreciated: Start IV diflucan (double dose) 400 mg BID as did not respond to PO regular dose  hem/onc eval for leukopenia (on letrozole, dose not cause leukopenia), f/u HIV  f/u stool studies (C. Diff PCR, acid fast, culture, ova and parasites),   f/u blood cultures, urine cultures, procal, fungitell   Lactate trending down  obtain MRSA nares  ID consult     2. neutropenia and leukopenia are possible side effects of femara, ANC 0.16 >, trend ANC, heme/onc eval (follows with Dr. Mendoza)     3. hold losartan 100mg PO daily for now, if hypertensive can restart, c/w atorvastatin while inpatient     4. wellbutrin xL 300mg PO daily     5. duloxetine 60mg PO daily     6. protonix for now     DVT ppx: HSQ   GI ppx: protonix   Dispo: acute, from home   Code status: discussed with patient at bedside, full code    Progress Note Handoff:   pending: Cultures, HIV  Dispo: acute   D/w patient at bedside

## 2021-12-11 NOTE — PROGRESS NOTE ADULT - ASSESSMENT
71 year old female with PMH of IBS, pancreatitis, Breast Ca s/p radiation on Femara presented to the hospital after being sent in for Dr. Babin for severe александр esophagitis.     #Severe candida esophagitis   #Sepsis present on admission likely secondary to esophagitis  #HAGMA likely secondary to lactic acidosis   - Patient is Severely neutropenia ANC <500, hypothermic, tachycardic, with a lactic acidosis on admission   - was on PO diflucan ( since 12/4) with no symptomatic improvement   - EGD: 12/3 -> sever candida esophagitis (confirmed on biopsy), HP neg  - s/p 2L IVF, cefepime, and flagyl in the ED   - ID recs appreciated   - Blood Cx is negative d/c cefepime   - Discontinued flagyl- C. 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 Mendoza)   - Spoke to Dr. Tang,   - Patient received 1 time dose of 400mcg Neupogen   - CT chest noted, patient will follow up with heme/onc as outpatient     #HTN   - c/w losartan 100mg PO daily   - added hydralazine     #HLD  - continue with atorvastatin while inpatient     #Depression  - continue with wellbutrin xL 300mg PO daily     #Fibromyalgia  - continue with duloxetine 60mg PO daily     #Misc   - DVT ppx: HSQ   - GI ppx: protonix   - Activity: IAT  - Diet: DASH Puree as tolerated    - Dispo: acute, from home   - Code status: full code  pending: discharge planning

## 2021-12-12 ENCOUNTER — TRANSCRIPTION ENCOUNTER (OUTPATIENT)
Age: 71
End: 2021-12-12

## 2021-12-12 VITALS
HEART RATE: 76 BPM | RESPIRATION RATE: 18 BRPM | SYSTOLIC BLOOD PRESSURE: 166 MMHG | TEMPERATURE: 98 F | DIASTOLIC BLOOD PRESSURE: 90 MMHG

## 2021-12-12 LAB
ALBUMIN SERPL ELPH-MCNC: 4 G/DL — SIGNIFICANT CHANGE UP (ref 3.5–5.2)
ALP SERPL-CCNC: 116 U/L — HIGH (ref 30–115)
ALT FLD-CCNC: 13 U/L — SIGNIFICANT CHANGE UP (ref 0–41)
ANION GAP SERPL CALC-SCNC: 19 MMOL/L — HIGH (ref 7–14)
AST SERPL-CCNC: 22 U/L — SIGNIFICANT CHANGE UP (ref 0–41)
BASOPHILS # BLD AUTO: 0.16 K/UL — SIGNIFICANT CHANGE UP (ref 0–0.2)
BASOPHILS NFR BLD AUTO: 0.9 % — SIGNIFICANT CHANGE UP (ref 0–1)
BILIRUB SERPL-MCNC: 0.6 MG/DL — SIGNIFICANT CHANGE UP (ref 0.2–1.2)
BUN SERPL-MCNC: 12 MG/DL — SIGNIFICANT CHANGE UP (ref 10–20)
CALCIUM SERPL-MCNC: 10.2 MG/DL — HIGH (ref 8.5–10.1)
CHLORIDE SERPL-SCNC: 105 MMOL/L — SIGNIFICANT CHANGE UP (ref 98–110)
CO2 SERPL-SCNC: 20 MMOL/L — SIGNIFICANT CHANGE UP (ref 17–32)
CREAT SERPL-MCNC: 1.2 MG/DL — SIGNIFICANT CHANGE UP (ref 0.7–1.5)
EOSINOPHIL # BLD AUTO: 0 K/UL — SIGNIFICANT CHANGE UP (ref 0–0.7)
EOSINOPHIL NFR BLD AUTO: 0 % — SIGNIFICANT CHANGE UP (ref 0–8)
GLUCOSE SERPL-MCNC: 88 MG/DL — SIGNIFICANT CHANGE UP (ref 70–99)
HCT VFR BLD CALC: 42.6 % — SIGNIFICANT CHANGE UP (ref 37–47)
HGB BLD-MCNC: 13.8 G/DL — SIGNIFICANT CHANGE UP (ref 12–16)
LYMPHOCYTES # BLD AUTO: 13.9 % — LOW (ref 20.5–51.1)
LYMPHOCYTES # BLD AUTO: 2.47 K/UL — SIGNIFICANT CHANGE UP (ref 1.2–3.4)
MAGNESIUM SERPL-MCNC: 1.8 MG/DL — SIGNIFICANT CHANGE UP (ref 1.8–2.4)
MANUAL SMEAR VERIFICATION: SIGNIFICANT CHANGE UP
MCHC RBC-ENTMCNC: 30.8 PG — SIGNIFICANT CHANGE UP (ref 27–31)
MCHC RBC-ENTMCNC: 32.4 G/DL — SIGNIFICANT CHANGE UP (ref 32–37)
MCV RBC AUTO: 95.1 FL — SIGNIFICANT CHANGE UP (ref 81–99)
METAMYELOCYTES # FLD: 0.9 % — HIGH (ref 0–0)
MONOCYTES # BLD AUTO: 0.78 K/UL — HIGH (ref 0.1–0.6)
MONOCYTES NFR BLD AUTO: 4.4 % — SIGNIFICANT CHANGE UP (ref 1.7–9.3)
NEUTROPHILS # BLD AUTO: 13.59 K/UL — HIGH (ref 1.4–6.5)
NEUTROPHILS NFR BLD AUTO: 76.5 % — HIGH (ref 42.2–75.2)
PLAT MORPH BLD: NORMAL — SIGNIFICANT CHANGE UP
PLATELET # BLD AUTO: 288 K/UL — SIGNIFICANT CHANGE UP (ref 130–400)
POLYCHROMASIA BLD QL SMEAR: SLIGHT — SIGNIFICANT CHANGE UP
POTASSIUM SERPL-MCNC: 4.5 MMOL/L — SIGNIFICANT CHANGE UP (ref 3.5–5)
POTASSIUM SERPL-SCNC: 4.5 MMOL/L — SIGNIFICANT CHANGE UP (ref 3.5–5)
PROMYELOCYTES # FLD: 1.7 % — HIGH (ref 0–0)
PROT SERPL-MCNC: 6.4 G/DL — SIGNIFICANT CHANGE UP (ref 6–8)
RBC # BLD: 4.48 M/UL — SIGNIFICANT CHANGE UP (ref 4.2–5.4)
RBC # FLD: 14.4 % — SIGNIFICANT CHANGE UP (ref 11.5–14.5)
RBC BLD AUTO: NORMAL — SIGNIFICANT CHANGE UP
SODIUM SERPL-SCNC: 144 MMOL/L — SIGNIFICANT CHANGE UP (ref 135–146)
VARIANT LYMPHS # BLD: 1.7 % — SIGNIFICANT CHANGE UP (ref 0–5)
WBC # BLD: 17.77 K/UL — HIGH (ref 4.8–10.8)
WBC # FLD AUTO: 17.77 K/UL — HIGH (ref 4.8–10.8)

## 2021-12-12 PROCEDURE — 99232 SBSQ HOSP IP/OBS MODERATE 35: CPT

## 2021-12-12 RX ORDER — LABETALOL HCL 100 MG
100 TABLET ORAL ONCE
Refills: 0 | Status: COMPLETED | OUTPATIENT
Start: 2021-12-12 | End: 2021-12-12

## 2021-12-12 RX ORDER — LABETALOL HCL 100 MG
1 TABLET ORAL
Qty: 60 | Refills: 0
Start: 2021-12-12 | End: 2022-01-10

## 2021-12-12 RX ORDER — LOPERAMIDE HCL 2 MG
1 TABLET ORAL
Qty: 0 | Refills: 0 | DISCHARGE

## 2021-12-12 RX ORDER — POLYETHYLENE GLYCOL 3350 17 G/17G
17 POWDER, FOR SOLUTION ORAL
Qty: 255 | Refills: 0
Start: 2021-12-12 | End: 2021-12-26

## 2021-12-12 RX ORDER — LABETALOL HCL 100 MG
100 TABLET ORAL
Refills: 0 | Status: DISCONTINUED | OUTPATIENT
Start: 2021-12-12 | End: 2021-12-12

## 2021-12-12 RX ADMIN — LOSARTAN POTASSIUM 100 MILLIGRAM(S): 100 TABLET, FILM COATED ORAL at 05:36

## 2021-12-12 RX ADMIN — POLYETHYLENE GLYCOL 3350 17 GRAM(S): 17 POWDER, FOR SOLUTION ORAL at 11:28

## 2021-12-12 RX ADMIN — ONDANSETRON 4 MILLIGRAM(S): 8 TABLET, FILM COATED ORAL at 08:15

## 2021-12-12 RX ADMIN — BUPROPION HYDROCHLORIDE 300 MILLIGRAM(S): 150 TABLET, EXTENDED RELEASE ORAL at 11:27

## 2021-12-12 RX ADMIN — ENOXAPARIN SODIUM 40 MILLIGRAM(S): 100 INJECTION SUBCUTANEOUS at 11:27

## 2021-12-12 RX ADMIN — VALACYCLOVIR 1000 MILLIGRAM(S): 500 TABLET, FILM COATED ORAL at 05:36

## 2021-12-12 RX ADMIN — DULOXETINE HYDROCHLORIDE 60 MILLIGRAM(S): 30 CAPSULE, DELAYED RELEASE ORAL at 11:27

## 2021-12-12 RX ADMIN — Medication 25 MILLIGRAM(S): at 05:35

## 2021-12-12 RX ADMIN — Medication 100 MILLIGRAM(S): at 16:18

## 2021-12-12 RX ADMIN — PANTOPRAZOLE SODIUM 40 MILLIGRAM(S): 20 TABLET, DELAYED RELEASE ORAL at 06:07

## 2021-12-12 RX ADMIN — Medication 1 ENEMA: at 15:46

## 2021-12-12 RX ADMIN — FLUCONAZOLE 100 MILLIGRAM(S): 150 TABLET ORAL at 11:27

## 2021-12-12 NOTE — PROGRESS NOTE ADULT - ASSESSMENT
71 year old female with PMH of IBS, pancreatitis, Breast Ca s/p radiation on Femara presented to the hospital after being sent in for Dr. Babin for severe александр esophagitis.         1. 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  Hx of breast cancer--Neutropenia and leukopenia are possible side effects of femara, ANC 0.16 >, trend ANC, heme/onc eval (follows with Dr. Mendoza)  started on neupogen-- improved  CT chest with iv contrast< from: CT Chest w/ IV Cont (12.10.21 @ 15:22) >  he esophagus is unremarkable. There is no evidence of hiatus hernia   or esophageal wall thickening. There is no evidence of mediastinal fluid   collection.    2. Stable right breast tissue/nodule measuring 2.6 cm in diameter. There   is no evidence of stranding in the surrounding soft tissues to suggest   inflammation. This finding may be related to previous breast surgery,   such as a seroma or area of fat necrosis. If clinically indicated, a   diagnostic mammogram and/or breast sonogram may be obtained.        and bone marrow bx as outpatient.    3.  HTN--restart losartan 100mg PO daily,  started on labetalol as hydralazine is not working  4. Fibromyalgia-- wellbutrin xL 300mg PO daily and duloxetine 60mg PO daily -- patient very anxoius  5. Morbid obesity is present.      DVT ppx: HSQ   GI ppx: protonix.  Dc planning weekend .-- patient has migraine and is constipated-- Did not take suppository-- enema ordered today     71 year old female with PMH of IBS, pancreatitis, Breast Ca s/p radiation on Femara presented to the hospital after being sent in for Dr. Babin for severe александр esophagitis.         1. 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  Hx of breast cancer--Neutropenia and leukopenia are possible side effects of femara, ANC 0.16 >, trend ANC, heme/onc eval (follows with Dr. Mendoza)  started on neupogen-- improved  CT chest with iv contrast< from: CT Chest w/ IV Cont (12.10.21 @ 15:22) >  he esophagus is unremarkable. There is no evidence of hiatus hernia   or esophageal wall thickening. There is no evidence of mediastinal fluid   collection.    2. Stable right breast tissue/nodule measuring 2.6 cm in diameter. There   is no evidence of stranding in the surrounding soft tissues to suggest   inflammation. This finding may be related to previous breast surgery,   such as a seroma or area of fat necrosis. If clinically indicated, a   diagnostic mammogram and/or breast sonogram may be obtained.        and bone marrow bx as outpatient.    3.  HTN--restart losartan 100mg PO daily,  started on labetalol as hydralazine is not working  4. Fibromyalgia-- wellbutrin xL 300mg PO daily and duloxetine 60mg PO daily -- patient very anxious  5. Morbid obesity is present.      DVT ppx: HSQ   GI ppx: protonix.  Dc planning weekend .-- patient has migraine and is constipated-- Did not take suppository-- enema ordered today

## 2021-12-12 NOTE — DISCHARGE NOTE NURSING/CASE MANAGEMENT/SOCIAL WORK - NSDCFUADDAPPT_GEN_ALL_CORE_FT
- follow up with Dr. Guzman or Dr. Tang after discharge   - Follow up with your primary care provider for management of blood pressure

## 2021-12-12 NOTE — PROGRESS NOTE ADULT - PROVIDER SPECIALTY LIST ADULT
Gastroenterology
Hospitalist
Gastroenterology
Internal Medicine
Heme/Onc
Internal Medicine
Infectious Disease

## 2021-12-12 NOTE — DISCHARGE NOTE NURSING/CASE MANAGEMENT/SOCIAL WORK - PATIENT PORTAL LINK FT
You can access the FollowMyHealth Patient Portal offered by [facility] by registering at the following website: [email or web address]. By joining [facility]’s FollowMyHealth portal, you will also be able to view your health information using other applications (apps) compatible with our system.

## 2021-12-12 NOTE — PROGRESS NOTE ADULT - SUBJECTIVE AND OBJECTIVE BOX
----------Daily Progress Note----------    HISTORY OF PRESENT ILLNESS:  Patient is a 71y old Female who presents with a chief complaint of Candida Esophagitis (08 Dec 2021 13:58)    Currently admitted to medicine with the primary diagnosis of Esophageal candidiasis      71 year old female with PMH of IBS, pancreatitis, Breast Ca s/p radiation on Femara and Letrozole presented to the hospital after being sent in for Dr. Babin for severe candida esophagitis. Patient's symptoms started after return from [place] on . She had many bouts of n/v with the absence of BMs. Patient came to the ED  for abdominal distension and inability to have a BM, CT AP did not show evidence of bowel obstruction or pneumoperitoneum at that time. Patient has since started having BMs and is able to tolerate a puree diet.  Patient had recent EGD with Dr. Babin on 12/3/21 which showed esophageal candidiasis and was started on PO diflucan. Patient was not responding to PO antifungals so she was sent in for further work up.     In the ED, /73, , RR 18, Temp 95.7F, SpO2 100% on RA. CT Abdomen and Pelvis with IV contrast showed no acute intra-abdominal pathology.  Labs showed leukopenia 0.95 and lactate 4.3. Patient received 2L of IVF, cefepime, and flagyl in the ED and was admitted to medicine.       Today is hospital day 3d.     INTERVAL HOSPITAL COURSE / OVERNIGHT EVENTS:    Patient was examined and seen at bedside. This morning she is resting comfortably in bed and reports no new issues or overnight events.     Review of Systems: Patient is feeling anxious this AM. patient denies any black stool, diarrhea n/v. Patient denies dysphagia. Patient denies abdominal pain, palpitation, SOB.     <<<<<PAST MEDICAL & SURGICAL HISTORY>>>>>  Pancreatitis, gallstone    Breast cancer  RT    Hypertension    Shingles rash    History of major abdominal surgery    History of seizures as a child    GERD (gastroesophageal reflux disease)     delivery delivered  x2    Abnormal cells in breast  RT breast bumpectomy      ALLERGIES  penicillin (Unknown)      Home Medications:  amitriptyline 25 mg oral tablet: 1 tab(s) orally once a day (at bedtime) (07 Dec 2021 20:50)  DULoxetine 60 mg oral delayed release capsule: 1 cap(s) orally once a day (07 Dec 2021 20:50)  Femara 2.5 mg oral tablet: 1 tab(s) orally once a day (07 Dec 2021 20:50)  Imodium 2 mg oral capsule: 1 cap(s) orally every 4 hours, As Needed (07 Dec 2021 20:50)  losartan 100 mg oral tablet: 1 tab(s) orally once a day (07 Dec 2021 20:50)  omeprazole 40 mg oral delayed release capsule: 1 cap(s) orally once a day (07 Dec 2021 20:50)  rosuvastatin 20 mg oral tablet: 1 tab(s) orally once a day (07 Dec 2021 20:50)  Wellbutrin  mg/24 hours oral tablet, extended release: 1 tab(s) orally every 24 hours (07 Dec 2021 20:50)  Xanax 0.25 mg oral tablet: 1 tab(s) orally once a day (at bedtime), As Needed (07 Dec 2021 20:51)        MEDICATIONS  STANDING MEDICATIONS  amitriptyline 25 milliGRAM(s) Oral at bedtime  atorvastatin 80 milliGRAM(s) Oral at bedtime  buPROPion XL (24-Hour) . 300 milliGRAM(s) Oral daily  DULoxetine 60 milliGRAM(s) Oral daily  enoxaparin Injectable 40 milliGRAM(s) SubCutaneous daily  fluconAZOLE IVPB 400 milliGRAM(s) IV Intermittent daily  losartan 100 milliGRAM(s) Oral daily  pantoprazole    Tablet 40 milliGRAM(s) Oral before breakfast  polyethylene glycol 3350 17 Gram(s) Oral daily  sodium chloride 3%  Inhalation 4 milliLiter(s) Inhalation once  valACYclovir 1000 milliGRAM(s) Oral every 24 hours    PRN MEDICATIONS  ALPRAZolam 0.25 milliGRAM(s) Oral at bedtime PRN  ondansetron Injectable 4 milliGRAM(s) IV Push every 4 hours PRN    VITALS:  T(F): 97.2  HR: 57  BP: 180/90  RR: 18  SpO2: 96%    <<<<<LABS>>>>>                        12.7   7.66  )-----------( 251      ( 10 Dec 2021 04:30 )             38.6     12-10    143  |  106  |  7<L>  ----------------------------<  91  4.3   |  20  |  0.7    Ca    9.6      10 Dec 2021 04:30  Mg     1.9     12-10    TPro  6.2  /  Alb  3.9  /  TBili  0.5  /  DBili  x   /  AST  12  /  ALT  8   /  AlkPhos  63  12-10      Urinalysis Basic - ( 09 Dec 2021 12:00 )    Color: Light Yellow / Appearance: Clear / S.012 / pH: x  Gluc: x / Ketone: Negative  / Bili: Negative / Urobili: <2 mg/dL   Blood: x / Protein: Trace / Nitrite: Negative   Leuk Esterase: Negative / RBC: x / WBC x   Sq Epi: x / Non Sq Epi: x / Bacteria: x            Culture - Blood (collected 07 Dec 2021 21:08)  Source: .Blood None  Preliminary Report (09 Dec 2021 08:00):    No growth to date.    [id number]        <<<<<RADIOLOGY>>>>>    < from: CT Abdomen and Pelvis w/ IV Cont (21 @ 19:30) >    IMPRESSION:    1. No acute intra-abdominal pathology.    2. Partially imaged right breast 2.4 cm soft tissue nodule, unchanged.    --- End of Report ---    < end of copied text >        <<<<<PHYSICAL EXAM>>>>>  GENERAL: Well developed, well nourished and in no acute distress. Resting comfortably in bed.  PULMONARY: Clear to auscultation bilaterally. No rales, rhonchi, or wheezing.  CARDIOVASCULAR: Regular rate and rhythm, S1-S2, no murmurs  GASTROINTESTINAL: Soft, non-tender, non-distended, no guarding.  SKIN/EXTREMITIES: No clubbing or edema  NEUROLOGIC/MUSCULOSKELETAL: AOx4, grossly moving all extremities, no focal deficits.        -----------------------------------------------------------------------------------------------------------------------------------------------------------------------------------------------
----------Daily Progress Note----------    HISTORY OF PRESENT ILLNESS:  Patient is a 71y old Female who presents with a chief complaint of Candida Esophagitis (08 Dec 2021 13:58)    Currently admitted to medicine with the primary diagnosis of Esophageal candidiasis      71 year old female with PMH of IBS, pancreatitis, Breast Ca s/p radiation on Femara and Letrozole presented to the hospital after being sent in for Dr. Babin for severe candida esophagitis. Patient's symptoms started after return from [place] on . She had many bouts of n/v with the absence of BMs. Patient came to the ED  for abdominal distension and inability to have a BM, CT AP did not show evidence of bowel obstruction or pneumoperitoneum at that time. Patient has since started having BMs and is able to tolerate a puree diet.  Patient had recent EGD with Dr. Babin on 12/3/21 which showed esophageal candidiasis and was started on PO diflucan. Patient was not responding to PO antifungals so she was sent in for further work up.     In the ED, /73, , RR 18, Temp 95.7F, SpO2 100% on RA. CT Abdomen and Pelvis with IV contrast showed no acute intra-abdominal pathology.  Labs showed leukopenia 0.95 and lactate 4.3. Patient received 2L of IVF, cefepime, and flagyl in the ED and was admitted to medicine.     Today is hospital day 2d.     INTERVAL HOSPITAL COURSE / OVERNIGHT EVENTS:    Patient was examined and seen at bedside. This morning she is resting comfortably in bed and reports no new issues or overnight events.     Review of Systems: patient denies any black stool, diarrhea n/v. Patient denies dysphagia. Patient denies abdominal pain, palpitation, SOB.     <<<<<PAST MEDICAL & SURGICAL HISTORY>>>>>  Pancreatitis, gallstone    Breast cancer  RT    Hypertension    Shingles rash    History of major abdominal surgery    History of seizures as a child    GERD (gastroesophageal reflux disease)     delivery delivered  x2    Abnormal cells in breast  RT breast bumpectomy      ALLERGIES  penicillin (Unknown)      Home Medications:  amitriptyline 25 mg oral tablet: 1 tab(s) orally once a day (at bedtime) (07 Dec 2021 20:50)  DULoxetine 60 mg oral delayed release capsule: 1 cap(s) orally once a day (07 Dec 2021 20:50)  Femara 2.5 mg oral tablet: 1 tab(s) orally once a day (07 Dec 2021 20:50)  Imodium 2 mg oral capsule: 1 cap(s) orally every 4 hours, As Needed (07 Dec 2021 20:50)  losartan 100 mg oral tablet: 1 tab(s) orally once a day (07 Dec 2021 20:50)  omeprazole 40 mg oral delayed release capsule: 1 cap(s) orally once a day (07 Dec 2021 20:50)  rosuvastatin 20 mg oral tablet: 1 tab(s) orally once a day (07 Dec 2021 20:50)  Wellbutrin  mg/24 hours oral tablet, extended release: 1 tab(s) orally every 24 hours (07 Dec 2021 20:50)  Xanax 0.25 mg oral tablet: 1 tab(s) orally once a day (at bedtime), As Needed (07 Dec 2021 20:51)        MEDICATIONS  STANDING MEDICATIONS  amitriptyline 25 milliGRAM(s) Oral at bedtime  atorvastatin 80 milliGRAM(s) Oral at bedtime  buPROPion XL (24-Hour) . 300 milliGRAM(s) Oral daily  cefepime   IVPB 2000 milliGRAM(s) IV Intermittent every 8 hours  DULoxetine 60 milliGRAM(s) Oral daily  enoxaparin Injectable 40 milliGRAM(s) SubCutaneous daily  fluconAZOLE IVPB 400 milliGRAM(s) IV Intermittent <User Schedule>  pantoprazole    Tablet 40 milliGRAM(s) Oral before breakfast  valACYclovir 1000 milliGRAM(s) Oral every 24 hours    PRN MEDICATIONS  ALPRAZolam 0.25 milliGRAM(s) Oral at bedtime PRN    VITALS:  T(F): 97.4  HR: 53  BP: 150/87  RR: --  SpO2: 93%    <<<<<LABS>>>>>                        11.6   1.46  )-----------( 237      ( 09 Dec 2021 04:30 )             35.9     12-08    139  |  106  |  14  ----------------------------<  98  4.0   |  18  |  1.1    Ca    8.6      08 Dec 2021 04:30  Mg     1.4     12-08    TPro  5.1<L>  /  Alb  3.2<L>  /  TBili  0.6  /  DBili  x   /  AST  12  /  ALT  7   /  AlkPhos  52  12-08            [id number]        <<<<<RADIOLOGY>>>>>  < from: CT Abdomen and Pelvis w/ IV Cont (21 @ 19:30) >    IMPRESSION:    1. No acute intra-abdominal pathology.    2. Partially imaged right breast 2.4 cm soft tissue nodule, unchanged.    --- End of Report ---    < end of copied text >        <<<<<PHYSICAL EXAM>>>>>  GENERAL: Well developed, well nourished and in no acute distress. Resting comfortably in bed.  PULMONARY: Clear to auscultation bilaterally. No rales, rhonchi, or wheezing.  CARDIOVASCULAR: Regular rate and rhythm, S1-S2, no murmurs  GASTROINTESTINAL: Soft, non-tender, non-distended, no guarding.  SKIN/EXTREMITIES: No clubbing or edema  NEUROLOGIC/MUSCULOSKELETAL: AOx4, grossly moving all extremities, no focal deficits.      -----------------------------------------------------------------------------------------------------------------------------------------------------------------------------------------------
----------Daily Progress Note----------    HISTORY OF PRESENT ILLNESS:  Patient is a 71y old Female who presents with a chief complaint of Candida Esophagitis (08 Dec 2021 13:58)    Currently admitted to medicine with the primary diagnosis of Esophageal candidiasis    71 year old female with PMH of IBS, pancreatitis, Breast Ca s/p radiation on Femara and Letrozole presented to the hospital after being sent in for Dr. Babin for severe candida esophagitis. Patient's symptoms started after return from [place] on . She had many bouts of n/v with the absence of BMs. Patient came to the ED  for abdominal distension and inability to have a BM, CT AP did not show evidence of bowel obstruction or pneumoperitoneum at that time. Patient has since started having BMs and is able to tolerate a puree diet.  Patient had recent EGD with Dr. Babin on 12/3/21 which showed esophageal candidiasis and was started on PO diflucan. Patient was not responding to PO antifungals so she was sent in for further work up.     In the ED, /73, , RR 18, Temp 95.7F, SpO2 100% on RA. CT Abdomen and Pelvis with IV contrast showed no acute intra-abdominal pathology.  Labs showed leukopenia 0.95 and lactate 4.3. Patient received 2L of IVF, cefepime, and flagyl in the ED and was admitted to medicine.           Today is hospital day 4d.     INTERVAL HOSPITAL COURSE / OVERNIGHT EVENTS:    Patient was examined and seen at bedside. This morning she is resting comfortably in bed and reports no new issues or overnight events.     Review of Systems: Patient is feeling anxious this AM, endorsing headache. patient denies any black stool, diarrhea n/v. Patient denies dysphagia. Patient denies abdominal pain, palpitation, SOB.         <<<<<PAST MEDICAL & SURGICAL HISTORY>>>>>  Pancreatitis, gallstone    Breast cancer  RT    Hypertension    Shingles rash    History of major abdominal surgery    History of seizures as a child    GERD (gastroesophageal reflux disease)     delivery delivered  x2    Abnormal cells in breast  RT breast bumpectomy      ALLERGIES  penicillin (Unknown)      Home Medications:  amitriptyline 25 mg oral tablet: 1 tab(s) orally once a day (at bedtime) (07 Dec 2021 20:50)  DULoxetine 60 mg oral delayed release capsule: 1 cap(s) orally once a day (07 Dec 2021 20:50)  Femara 2.5 mg oral tablet: 1 tab(s) orally once a day (07 Dec 2021 20:50)  Imodium 2 mg oral capsule: 1 cap(s) orally every 4 hours, As Needed (07 Dec 2021 20:50)  losartan 100 mg oral tablet: 1 tab(s) orally once a day (07 Dec 2021 20:50)  omeprazole 40 mg oral delayed release capsule: 1 cap(s) orally once a day (07 Dec 2021 20:50)  rosuvastatin 20 mg oral tablet: 1 tab(s) orally once a day (07 Dec 2021 20:50)  Wellbutrin  mg/24 hours oral tablet, extended release: 1 tab(s) orally every 24 hours (07 Dec 2021 20:50)  Xanax 0.25 mg oral tablet: 1 tab(s) orally once a day (at bedtime), As Needed (07 Dec 2021 20:51)        MEDICATIONS  STANDING MEDICATIONS  amitriptyline 25 milliGRAM(s) Oral at bedtime  atorvastatin 80 milliGRAM(s) Oral at bedtime  buPROPion XL (24-Hour) . 300 milliGRAM(s) Oral daily  DULoxetine 60 milliGRAM(s) Oral daily  enoxaparin Injectable 40 milliGRAM(s) SubCutaneous daily  fluconAZOLE IVPB 400 milliGRAM(s) IV Intermittent daily  hydrALAZINE 25 milliGRAM(s) Oral two times a day  ketorolac   Injectable 15 milliGRAM(s) IV Push once  losartan 100 milliGRAM(s) Oral daily  pantoprazole    Tablet 40 milliGRAM(s) Oral before breakfast  polyethylene glycol 3350 17 Gram(s) Oral daily  sodium chloride 3%  Inhalation 4 milliLiter(s) Inhalation once  valACYclovir 1000 milliGRAM(s) Oral every 24 hours    PRN MEDICATIONS  acetaminophen     Tablet .. 650 milliGRAM(s) Oral every 6 hours PRN  ALPRAZolam 0.25 milliGRAM(s) Oral at bedtime PRN  bisacodyl Suppository 10 milliGRAM(s) Rectal daily PRN  ondansetron Injectable 4 milliGRAM(s) IV Push every 4 hours PRN    VITALS:  T(F): 98.1  HR: 73  BP: 168/93  RR: 18  SpO2: 98%    <<<<<LABS>>>>>                        12.7   7.66  )-----------( 251      ( 10 Dec 2021 04:30 )             38.6     12-11    139  |  102  |  7<L>  ----------------------------<  89  5.3<H>   |  18  |  0.8    Ca    9.8      11 Dec 2021 04:30  Mg     1.8     12-11    TPro  6.5  /  Alb  4.1  /  TBili  0.6  /  DBili  x   /  AST  23  /  ALT  11  /  AlkPhos  99  12-11      Urinalysis Basic - ( 09 Dec 2021 12:00 )    Color: Light Yellow / Appearance: Clear / S.012 / pH: x  Gluc: x / Ketone: Negative  / Bili: Negative / Urobili: <2 mg/dL   Blood: x / Protein: Trace / Nitrite: Negative   Leuk Esterase: Negative / RBC: x / WBC x   Sq Epi: x / Non Sq Epi: x / Bacteria: x            Culture - Acid Fast - Stool w/Smear (collected 09 Dec 2021 17:20)  Source: .Stool Stool    Culture - Stool (collected 09 Dec 2021 17:20)  Source: .Stool Feces  Preliminary Report (10 Dec 2021 15:04):    No enteric pathogens to date: Final culture pending    Culture - Urine (collected 09 Dec 2021 12:00)  Source: Clean Catch Clean Catch (Midstream)  Final Report (10 Dec 2021 17:26):    No growth    [id number]        <<<<<RADIOLOGY>>>>>    < from: CT Abdomen and Pelvis w/ IV Cont (21 @ 19:30) >    IMPRESSION:    1. No acute intra-abdominal pathology.    2. Partially imaged right breast 2.4 cm soft tissue nodule, unchanged.    --- End of Report ---    < end of copied text >    < from: CT Chest w/ IV Cont (12.10.21 @ 15:22) >  IMPRESSION:    1. The esophagus is unremarkable. There is no evidence of hiatus hernia   or esophageal wall thickening. There is no evidence of mediastinal fluid   collection.    2. Stable right breast tissue/nodule measuring 2.6 cm in diameter. There   is no evidence of stranding in the surrounding soft tissues to suggest   inflammation. This finding may be related to previous breast surgery,   such as a seroma or area of fat necrosis. If clinically indicated, a   diagnostic mammogram and/or breast sonogram may be obtained.      --- End of Report ---    < end of copied text >        <<<<<PHYSICAL EXAM>>>>>  GENERAL: Well developed, well nourished and in no acute distress. Resting comfortably in bed.  PULMONARY: Clear to auscultation bilaterally. No rales, rhonchi, or wheezing.  CARDIOVASCULAR: Regular rate and rhythm, S1-S2, no murmurs  GASTROINTESTINAL: Soft, non-tender, non-distended, no guarding.  SKIN/EXTREMITIES: No clubbing or edema  NEUROLOGIC/MUSCULOSKELETAL: AOx4, grossly moving all extremities, no focal deficits.        -----------------------------------------------------------------------------------------------------------------------------------------------------------------------------------------------
Gastroenterology progress note:     Patient is a 71y old  Female who presents with a chief complaint of Candida Esophagitis (08 Dec 2021 13:58)       Admitted on: 21    We are following the patient for: candida esophagitis     Interval History:  can tolerate po intake, mild nausea after valacyclovir      PAST MEDICAL & SURGICAL HISTORY:  Pancreatitis, gallstone    Breast cancer  RT    Hypertension    Shingles rash    History of major abdominal surgery    History of seizures as a child    GERD (gastroesophageal reflux disease)     delivery delivered  x2    Abnormal cells in breast  RT breast bumpectomy        MEDICATIONS  (STANDING):  ALBUTerol    0.083%. 2.5 milliGRAM(s) Nebulizer once  amitriptyline 25 milliGRAM(s) Oral at bedtime  atorvastatin 80 milliGRAM(s) Oral at bedtime  buPROPion XL (24-Hour) . 300 milliGRAM(s) Oral daily  DULoxetine 60 milliGRAM(s) Oral daily  enoxaparin Injectable 40 milliGRAM(s) SubCutaneous daily  fluconAZOLE IVPB 400 milliGRAM(s) IV Intermittent daily  losartan 100 milliGRAM(s) Oral daily  pantoprazole    Tablet 40 milliGRAM(s) Oral before breakfast  sodium chloride 3%  Inhalation 4 milliLiter(s) Inhalation once  valACYclovir 1000 milliGRAM(s) Oral every 24 hours    MEDICATIONS  (PRN):  ALPRAZolam 0.25 milliGRAM(s) Oral at bedtime PRN insomnia  ondansetron Injectable 4 milliGRAM(s) IV Push every 4 hours PRN Nausea and/or Vomiting      Allergies  penicillin (Unknown)      Review of Systems:   Constitutional: Ne Fever, No chills, No weakness  ENT: No visual changes, No throat pain  Cardiovascular:  No Chest Pain, No Palpitations  Respiratory:  No Cough, No Dyspnea  Gastrointestinal:  As described in HPI  Neurological: No numbness or weakness  Skin: No rash, no itching    Physical Examination:  T(C): 36.2 (12-10-21 @ 05:07), Max: 36.7 (21 @ 15:25)  HR: 57 (12-10-21 @ 05:07) (52 - 62)  BP: 167/94 (12-10-21 @ 05:07) (145/60 - 172/88)  RR: 18 (12-10-21 @ 05:07) (18 - 18)  SpO2: 96% (12-10-21 @ 05:07) (96% - 96%)      Constitutional: No acute distress.  Respiratory:  No signs of respiratory distress. Lung sounds are clear bilaterally.  Cardiovascular:  S1 S2, Regular rate and rhythm.  Abdominal: Abdomen is soft, symmetric, and non-tender without distention. There are no visible lesions. Bowel sounds are present and normoactive in all four quadrants. No masses, hepatomegaly, or splenomegaly are noted.   Skin: No rashes, No Jaundice.  Neurology: AAOX3, Non-focal  Skin: No rash, No excoriation  Vascular: No varicose vein, No cyanosis, No edema        Data: (reviewed by attending)                        11.6   1.46  )-----------( 237      ( 09 Dec 2021 04:30 )             35.9     Hgb trend:  11.6  21 @ 04:30  11.3  21 @ 11:00  10.7  21 @ 04:30  15.4  21 @ 16:19      12    140  |  106  |  10  ----------------------------<  107<H>  4.2   |  19  |  0.8    Ca    8.8      09 Dec 2021 04:30  Mg     2.7     -    TPro  5.7<L>  /  Alb  3.5  /  TBili  0.5  /  DBili  x   /  AST  11  /  ALT  8   /  AlkPhos  56      Liver panel trend:  TBili 0.5   /   AST 11   /   ALT 8   /   AlkP 56   /   Tptn 5.7   /   Alb 3.5    /   DBili --        TBili 0.6   /   AST 12   /   ALT 7   /   AlkP 52   /   Tptn 5.1   /   Alb 3.2    /   DBili --        TBili 0.5   /   AST 21   /   ALT 10   /   AlkP 80   /   Tptn 7.0   /   Alb 4.3    /   DBili --                Culture - Blood (collected 07 Dec 2021 21:08)  Source: .Blood None  Preliminary Report (09 Dec 2021 08:00):    No growth to date.         Radiology: (reviewed by attending)      
MANDY THOMPSON  71y, Female  Allergy: penicillin (Unknown)      LOS  2d    CHIEF COMPLAINT: Candida Esophagitis (08 Dec 2021 13:58)      INTERVAL EVENTS/HPI  - No acute events overnight  - T(F): , Max: 97.8 (21 @ 07:50)  - Denies any worsening symptoms  - Tolerating medication  - WBC Count: 1.46 (21 @ 04:30)  WBC Count: 1.47 (21 @ 11:00)     - Creatinine, Serum: 0.8 (21 @ 04:30)  Creatinine, Serum: 1.1 (21 @ 04:30)       ROS  General: Denies rigors, nightsweats  HEENT: Denies headache, rhinorrhea, sore throat, eye pain  CV: Denies CP, palpitations  PULM: Denies wheezing, hemoptysis  GI: Denies hematemesis, hematochezia, melena  : Denies discharge, hematuria  MSK: Denies arthralgias, myalgias  SKIN: Denies rash, lesions  NEURO: Denies paresthesias, weakness  PSYCH: Denies depression, anxiety    VITALS:  T(F): 97.8, Max: 97.8 (21 @ 07:50)  HR: 62  BP: 172/88  RR: 18Vital Signs Last 24 Hrs  T(C): 36.6 (09 Dec 2021 07:50), Max: 36.6 (09 Dec 2021 07:50)  T(F): 97.8 (09 Dec 2021 07:50), Max: 97.8 (09 Dec 2021 07:50)  HR: 62 (09 Dec 2021 07:50) (53 - 62)  BP: 172/88 (09 Dec 2021 07:50) (140/78 - 172/88)  BP(mean): --  RR: 18 (09 Dec 2021 07:50) (18 - 18)  SpO2: 93% (09 Dec 2021 00:00) (93% - 93%)    PHYSICAL EXAM:  Gen: NAD, resting in bed  HEENT: Normocephalic, atraumatic  Neck: supple, no lymphadenopathy  CV: Regular rate & regular rhythm  Lungs: decreased BS at bases, no fremitus  Abdomen: Soft, BS present  Ext: Warm, well perfused  Neuro: non focal, awake  Skin: no rash, no erythema  Lines: no phlebitis    FH: Non-contributory  Social Hx: Non-contributory    TESTS & MEASUREMENTS:                        11.6   1.46  )-----------( 237      ( 09 Dec 2021 04:30 )             35.9         140  |  106  |  10  ----------------------------<  107<H>  4.2   |  19  |  0.8    Ca    8.8      09 Dec 2021 04:30  Mg     2.7         TPro  5.7<L>  /  Alb  3.5  /  TBili  0.5  /  DBili  x   /  AST  11  /  ALT  8   /  AlkPhos  56      eGFR if Non African American: 75 mL/min/1.73M2 (21 @ 04:30)  eGFR if African American: 87 mL/min/1.73M2 (21 @ 04:30)    LIVER FUNCTIONS - ( 09 Dec 2021 04:30 )  Alb: 3.5 g/dL / Pro: 5.7 g/dL / ALK PHOS: 56 U/L / ALT: 8 U/L / AST: 11 U/L / GGT: x           Urinalysis Basic - ( 09 Dec 2021 12:00 )    Color: Light Yellow / Appearance: Clear / S.012 / pH: x  Gluc: x / Ketone: Negative  / Bili: Negative / Urobili: <2 mg/dL   Blood: x / Protein: Trace / Nitrite: Negative   Leuk Esterase: Negative / RBC: x / WBC x   Sq Epi: x / Non Sq Epi: x / Bacteria: x        Culture - Blood (collected 21 @ 21:08)  Source: .Blood None  Preliminary Report (21 @ 08:00):    No growth to date.        Lactate, Blood: 2.6 mmol/L (21 @ 04:30)  Lactate, Blood: 3.4 mmol/L (21 @ 21:08)  Blood Gas Venous - Lactate: 2.70 mmol/L (21 @ 21:01)  Lactate, Blood: 5.2 mmol/L (21 @ 18:34)  Lactate, Blood: 4.1 mmol/L (21 @ 16:19)      INFECTIOUS DISEASES TESTING  HIV-1/2 Combo Result: Nonreact (21 @ 19:26)  COVID-19 PCR: NotDetec (21 @ 16:19)  Rapid RVP Result: NotDetec (20 @ 19:03)      INFLAMMATORY MARKERS      RADIOLOGY & ADDITIONAL TESTS:  I have personally reviewed the last available Chest xray  CXR      CT  CT Abdomen and Pelvis w/ IV Cont:   EXAM:  CT ABDOMEN AND PELVIS IC            PROCEDURE DATE:  2021            INTERPRETATION:  CLINICAL STATEMENT: Abdominal pain    TECHNIQUE: Contiguous axial CT images were obtained from the lower chest to the pubic symphysis following administration of 100cc Optiray 320 intravenous contrast.  Oral contrast was not administered.  Reformatted images in the coronal and sagittal planes were acquired.    COMPARISON CT: 2021    OTHER STUDIES USED FOR CORRELATION: None.      FINDINGS:    LOWER CHEST: Lingular atelectasis..    HEPATOBILIARY: No suspicious parenchymal lesion or biliary ductal dilatation.    SPLEEN: Unremarkable.    PANCREAS: Unremarkable.    ADRENAL GLANDS: Unremarkable.    KIDNEYS: Symmetric renal enhancement without hydronephrosis bilaterally.    ABDOMINOPELVIC NODES: Unremarkable.    PELVIC ORGANS: Left adnexal calcifications.    PERITONEUM/MESENTERY/BOWEL: No bowel obstruction, pneumoperitoneum or ascites. Sigmoid diverticulosis without diverticulitis. Normal caliber appendix..    BONES/SOFT TISSUES: Mild compression deformity T12 vertebral body, unchanged. Chronic degenerative changes throughout the spine..    OTHER: Midline abdominal surgical clips, unchanged. Right breast 2.4 cm soft tissue lesion(series 4 image 11)      IMPRESSION:    1. No acute intra-abdominal pathology.    2. Partially imaged right breast 2.4 cm soft tissue nodule, unchanged.    --- End of Report ---              ELLIOT LANDAU MD; Attending Radiologist  This document has been electronically signed. Dec  7 2021  8:09PM (21 @ 19:30)      CARDIOLOGY TESTING  12 Lead ECG:   Ventricular Rate 52 BPM    Atrial Rate 52 BPM    P-R Interval 152 ms    QRS Duration 142 ms    Q-T Interval 484 ms    QTC Calculation(Bazett) 450 ms    P Axis 48 degrees    R Axis 20 degrees    T Axis 38 degrees    Diagnosis Line Sinus bradycardia  Right bundle branch block  Abnormal ECG    Confirmed by Neil Watts ([id number]) on 2021 3:29:18 PM (21 @ 09:12)      MEDICATIONS  amitriptyline 25 Oral at bedtime  atorvastatin 80 Oral at bedtime  buPROPion XL (24-Hour) . 300 Oral daily  DULoxetine 60 Oral daily  enoxaparin Injectable 40 SubCutaneous daily  fluconAZOLE IVPB 400 IV Intermittent daily  mineral oil enema 133 Rectal once  pantoprazole    Tablet 40 Oral before breakfast  valACYclovir 1000 Oral every 24 hours      WEIGHT  Weight (kg): 81.6 (21 @ 14:35)  Creatinine, Serum: 0.8 mg/dL (21 @ 04:30)      ANTIBIOTICS:  fluconAZOLE IVPB 400 milliGRAM(s) IV Intermittent daily  valACYclovir 1000 milliGRAM(s) Oral every 24 hours      All available historical records have been reviewed      
This form was completed and signed at appt 9/20/21  Copy to scan
pt admitted with sev dysphagia ,little better on diflucan   also getting antibiotics
SUBJECTIVE:    Patient is a 71y old Female who presents with a chief complaint of Candida Esophagitis (08 Dec 2021 13:58)    Currently admitted to medicine with the primary diagnosis of Esophageal candidiasis       Today is hospital day 5d.     PAST MEDICAL & SURGICAL HISTORY  Pancreatitis, gallstone    Breast cancer  RT    Hypertension    Shingles rash    History of major abdominal surgery    History of seizures as a child    GERD (gastroesophageal reflux disease)     delivery delivered  x2    Abnormal cells in breast  RT breast bumpectomy      ALLERGIES:  penicillin (Unknown)    MEDICATIONS:  STANDING MEDICATIONS  amitriptyline 25 milliGRAM(s) Oral at bedtime  atorvastatin 80 milliGRAM(s) Oral at bedtime  buPROPion XL (24-Hour) . 300 milliGRAM(s) Oral daily  DULoxetine 60 milliGRAM(s) Oral daily  enoxaparin Injectable 40 milliGRAM(s) SubCutaneous daily  fluconAZOLE IVPB 400 milliGRAM(s) IV Intermittent daily  labetalol 100 milliGRAM(s) Oral two times a day  labetalol 100 milliGRAM(s) Oral once  losartan 100 milliGRAM(s) Oral daily  pantoprazole    Tablet 40 milliGRAM(s) Oral before breakfast  polyethylene glycol 3350 17 Gram(s) Oral daily  saline laxative (FLEET) Rectal Enema 1 Enema Rectal once  sodium chloride 3%  Inhalation 4 milliLiter(s) Inhalation once  valACYclovir 1000 milliGRAM(s) Oral every 24 hours    PRN MEDICATIONS  acetaminophen     Tablet .. 650 milliGRAM(s) Oral every 6 hours PRN  ALPRAZolam 0.25 milliGRAM(s) Oral at bedtime PRN  bisacodyl Suppository 10 milliGRAM(s) Rectal daily PRN  ondansetron Injectable 4 milliGRAM(s) IV Push every 4 hours PRN    VITALS:   T(F): 98.4  HR: 75  BP: 169/91  RR: 19  SpO2: 96%    LABS:                        13.8   17.77 )-----------( 288      ( 12 Dec 2021 05:53 )             42.6     12-    144  |  105  |  12  ----------------------------<  88  4.5   |  20  |  1.2    Ca    10.2<H>      12 Dec 2021 05:53  Mg     1.8     12-12    TPro  6.4  /  Alb  4.0  /  TBili  0.6  /  DBili  x   /  AST  22  /  ALT  13  /  AlkPhos  116<H>  12-12              Culture - Acid Fast - Stool w/Smear (collected 09 Dec 2021 17:20)  Source: .Stool Stool  Preliminary Report (11 Dec 2021 15:05):    Culture is being performed.    Culture - Stool (collected 09 Dec 2021 17:20)  Source: .Stool Feces  Final Report (11 Dec 2021 13:58):    No enteric pathogens isolated.    (Stool culture examined for Salmonella,    Shigella, Campylobacter, Aeromonas, Plesiomonas,    Vibrio, E.coli O157 and Yersinia)          RADIOLOGY:    PHYSICAL EXAM:  GEN: No acute distress  LUNGS: Clear to auscultation bilaterally   HEART: S1/S2 present. RRR.   ABD/ GI: Soft, non-tender, non-distended. Bowel sounds present  EXT: NC/NC/NE/2+PP/JONES  NEURO: AAOX3    
Gastroenterology progress note:     Patient is a 71y old  Female who presents with a chief complaint of Candida Esophagitis (08 Dec 2021 13:58)       Admitted on: 21    We are following the patient for: candida esophagitis     Interval History:  Patient nausea improved , she had half pancake for breakfast.       PAST MEDICAL & SURGICAL HISTORY:  Pancreatitis, gallstone    Breast cancer  RT    Hypertension    Shingles rash    History of major abdominal surgery    History of seizures as a child    GERD (gastroesophageal reflux disease)     delivery delivered  x2    Abnormal cells in breast  RT breast bumpectomy        MEDICATIONS  (STANDING):  amitriptyline 25 milliGRAM(s) Oral at bedtime  atorvastatin 80 milliGRAM(s) Oral at bedtime  buPROPion XL (24-Hour) . 300 milliGRAM(s) Oral daily  DULoxetine 60 milliGRAM(s) Oral daily  enoxaparin Injectable 40 milliGRAM(s) SubCutaneous daily  fluconAZOLE IVPB 400 milliGRAM(s) IV Intermittent daily  pantoprazole    Tablet 40 milliGRAM(s) Oral before breakfast  valACYclovir 1000 milliGRAM(s) Oral every 24 hours    MEDICATIONS  (PRN):  ALPRAZolam 0.25 milliGRAM(s) Oral at bedtime PRN insomnia  ondansetron Injectable 4 milliGRAM(s) IV Push every 4 hours PRN Nausea and/or Vomiting      Allergies  penicillin (Unknown)      Review of Systems:   Constitutional: Ne Fever, No chills, No weakness  ENT: No visual changes, No throat pain  Cardiovascular:  No Chest Pain, No Palpitations  Respiratory:  No Cough, No Dyspnea  Gastrointestinal:  As described in HPI  Neurological: No numbness or weakness  Skin: No rash, no itching    Physical Examination:  T(C): 36.6 (21 @ 07:50), Max: 36.6 (21 @ 07:50)  HR: 62 (21 @ 07:50) (53 - 62)  BP: 172/88 (21 @ 07:50) (140/78 - 172/88)  RR: 18 (21 @ 07:50) (18 - 18)  SpO2: 93% (21 @ 00:00) (93% - 93%)      Constitutional: No acute distress.  Respiratory:  No signs of respiratory distress. Lung sounds are clear bilaterally.  Cardiovascular:  S1 S2, Regular rate and rhythm.  Abdominal: Abdomen is soft, symmetric, and non-tender without distention. There are no visible lesions. Bowel sounds are present and normoactive in all four quadrants. No masses, hepatomegaly, or splenomegaly are noted.   Skin: No rashes, No Jaundice.  Neurology: AAOX3, Non-focal  Skin: No rash, No excoriation  Vascular: No varicose vein, No cyanosis, No edema        Data: (reviewed by attending)                        11.6   1.46  )-----------( 237      ( 09 Dec 2021 04:30 )             35.9     Hgb trend:  11.6  21 @ 04:30  11.3  21 @ 11:00  10.7  21 @ 04:30  15.4  21 @ 16:19      12-    140  |  106  |  10  ----------------------------<  107<H>  4.2   |  19  |  0.8    Ca    8.8      09 Dec 2021 04:30  Mg     2.7         TPro  5.7<L>  /  Alb  3.5  /  TBili  0.5  /  DBili  x   /  AST  11  /  ALT  8   /  AlkPhos  56      Liver panel trend:  TBili 0.5   /   AST 11   /   ALT 8   /   AlkP 56   /   Tptn 5.7   /   Alb 3.5    /   DBili --        TBili 0.6   /   AST 12   /   ALT 7   /   AlkP 52   /   Tptn 5.1   /   Alb 3.2    /   DBili --        TBili 0.5   /   AST 21   /   ALT 10   /   AlkP 80   /   Tptn 7.0   /   Alb 4.3    /   DBili --                Culture - Blood (collected 07 Dec 2021 21:08)  Source: .Blood None  Preliminary Report (09 Dec 2021 08:00):    No growth to date.               
SUBJECTIVE:  HPI:  71 year old female with PMH of IBS, pancreatitis, Breast Ca s/p radiation on Femara and Letrozole presented to the hospital after being sent in for Dr. Babin for severe candida esophagitis. Patient's symptoms started after return from [place] on . She had many bouts of n/v with the absence of BMs. Patient came to the ED  for abdominal distension and inability to have a BM, CT AP did not show evidence of bowel obstruction or pneumoperitoneum at that time. Patient has since started having BMs and is able to tolerate a puree diet.  Patient had recent EGD with Dr. Babin on 12/3/21 which showed esophageal candidiasis and was started on PO diflucan. Patient was not responding to PO antifungals so she was sent in for further work up.     In the ED, /73, , RR 18, Temp 95.7F, SpO2 100% on RA. CT Abdomen and Pelvis with IV contrast showed no acute intra-abdominal pathology.  Labs showed leukopenia 0.95 and lactate 4.3. Patient received 2L of IVF, cefepime, and flagyl in the ED and was admitted to medicine. (07 Dec 2021 18:53)      PAST MEDICAL & SURGICAL HISTORY  PAST MEDICAL & SURGICAL HISTORY:  Pancreatitis, gallstone    Breast cancer  RT    Hypertension    Shingles rash    History of major abdominal surgery    History of seizures as a child    GERD (gastroesophageal reflux disease)     delivery delivered  x2    Abnormal cells in breast  RT breast bumpectomy      SOCIAL HISTORY:    ALLERGIES:  penicillin (Unknown)    MEDICATIONS:  STANDING MEDICATIONS  amitriptyline 25 milliGRAM(s) Oral at bedtime  atorvastatin 80 milliGRAM(s) Oral at bedtime  buPROPion XL (24-Hour) . 300 milliGRAM(s) Oral daily  cefepime   IVPB 2000 milliGRAM(s) IV Intermittent every 8 hours  DULoxetine 60 milliGRAM(s) Oral daily  enoxaparin Injectable 40 milliGRAM(s) SubCutaneous daily  fluconAZOLE IVPB 400 milliGRAM(s) IV Intermittent every 24 hours  metroNIDAZOLE    Tablet 500 milliGRAM(s) Oral every 8 hours  pantoprazole    Tablet 40 milliGRAM(s) Oral before breakfast    PRN MEDICATIONS  ALPRAZolam 0.25 milliGRAM(s) Oral at bedtime PRN  ondansetron Injectable 4 milliGRAM(s) IV Push every 6 hours PRN    VITALS:   T(F): 97.8  HR: 66  BP: 111/62  RR: 18  SpO2: 98%    LABS:                        11.3   1.47  )-----------( 219      ( 08 Dec 2021 11:00 )             35.3         139  |  106  |  14  ----------------------------<  98  4.0   |  18  |  1.1    Ca    8.6      08 Dec 2021 04:30  Mg     1.4         TPro  5.1<L>  /  Alb  3.2<L>  /  TBili  0.6  /  DBili  x   /  AST  12  /  ALT  7   /  AlkPhos  52            Lactate, Blood: 2.6 mmol/L *H* (21 @ 04:30)  Lactate, Blood: 3.4 mmol/L *H* (21 @ 21:08)  Lactate, Blood: 5.2 mmol/L *HH* (21 @ 18:34)  Lactate, Blood: 4.1 mmol/L *HH* (21 @ 16:19)          RADIOLOGY:    PHYSICAL EXAM:  GEN: No acute distress  HEENT: AT/NC PEERLA, EOMI  LUNGS: Clear to auscultation bilaterally  HEART: S1/S2 present  ABD: Soft, non-tender, non-distended. Bowel sounds present in all 4 quadrants  EXT: No edema, no rashes, no cyanosis  NEURO: AAOX3

## 2021-12-12 NOTE — DISCHARGE NOTE NURSING/CASE MANAGEMENT/SOCIAL WORK - NSDCPEFALRISK_GEN_ALL_CORE
For information on Fall & Injury Prevention, visit: [email or web address] OR  [email or web address] OR  https://www.cdc.gov/steadi/patient.html

## 2021-12-13 LAB
CULTURE RESULTS: SIGNIFICANT CHANGE UP
CULTURE RESULTS: SIGNIFICANT CHANGE UP
SPECIMEN SOURCE: SIGNIFICANT CHANGE UP
SPECIMEN SOURCE: SIGNIFICANT CHANGE UP

## 2021-12-14 ENCOUNTER — INPATIENT (INPATIENT)
Facility: HOSPITAL | Age: 71
LOS: 5 days | Discharge: HOME | End: 2021-12-20
Attending: HOSPITALIST | Admitting: HOSPITALIST
Payer: MEDICARE

## 2021-12-14 VITALS
DIASTOLIC BLOOD PRESSURE: 79 MMHG | HEIGHT: 62 IN | TEMPERATURE: 96 F | RESPIRATION RATE: 20 BRPM | OXYGEN SATURATION: 98 % | HEART RATE: 90 BPM | SYSTOLIC BLOOD PRESSURE: 115 MMHG

## 2021-12-14 DIAGNOSIS — N64.59 OTHER SIGNS AND SYMPTOMS IN BREAST: Chronic | ICD-10-CM

## 2021-12-14 LAB
ALBUMIN SERPL ELPH-MCNC: 4.3 G/DL — SIGNIFICANT CHANGE UP (ref 3.5–5.2)
ALP SERPL-CCNC: 128 U/L — HIGH (ref 30–115)
ALT FLD-CCNC: 13 U/L — SIGNIFICANT CHANGE UP (ref 0–41)
ANION GAP SERPL CALC-SCNC: 23 MMOL/L — HIGH (ref 7–14)
ANISOCYTOSIS BLD QL: SLIGHT — SIGNIFICANT CHANGE UP
APPEARANCE UR: CLEAR — SIGNIFICANT CHANGE UP
AST SERPL-CCNC: 20 U/L — SIGNIFICANT CHANGE UP (ref 0–41)
BACTERIA STL CULT: NORMAL
BASE EXCESS BLDV CALC-SCNC: -4.1 MMOL/L — LOW (ref -2–3)
BASOPHILS # BLD AUTO: 0 K/UL — SIGNIFICANT CHANGE UP (ref 0–0.2)
BASOPHILS NFR BLD AUTO: 0 % — SIGNIFICANT CHANGE UP (ref 0–1)
BILIRUB SERPL-MCNC: 0.4 MG/DL — SIGNIFICANT CHANGE UP (ref 0.2–1.2)
BILIRUB UR-MCNC: NEGATIVE — SIGNIFICANT CHANGE UP
BUN SERPL-MCNC: 17 MG/DL — SIGNIFICANT CHANGE UP (ref 10–20)
C DIFF TOXIN B QL PCR REFLEX: NORMAL
CA-I SERPL-SCNC: 1.15 MMOL/L — SIGNIFICANT CHANGE UP (ref 1.15–1.33)
CALCIUM SERPL-MCNC: 9.6 MG/DL — SIGNIFICANT CHANGE UP (ref 8.5–10.1)
CHLORIDE SERPL-SCNC: 102 MMOL/L — SIGNIFICANT CHANGE UP (ref 98–110)
CO2 SERPL-SCNC: 14 MMOL/L — LOW (ref 17–32)
COLOR SPEC: SIGNIFICANT CHANGE UP
CREAT SERPL-MCNC: 0.9 MG/DL — SIGNIFICANT CHANGE UP (ref 0.7–1.5)
DIFF PNL FLD: NEGATIVE — SIGNIFICANT CHANGE UP
EOSINOPHIL # BLD AUTO: 0.11 K/UL — SIGNIFICANT CHANGE UP (ref 0–0.7)
EOSINOPHIL NFR BLD AUTO: 7.1 % — SIGNIFICANT CHANGE UP (ref 0–8)
GAS PNL BLDV: 137 MMOL/L — SIGNIFICANT CHANGE UP (ref 136–145)
GAS PNL BLDV: SIGNIFICANT CHANGE UP
GDH ANTIGEN: NOT DETECTED
GIANT PLATELETS BLD QL SMEAR: PRESENT — SIGNIFICANT CHANGE UP
GLUCOSE SERPL-MCNC: 133 MG/DL — HIGH (ref 70–99)
GLUCOSE UR QL: NEGATIVE — SIGNIFICANT CHANGE UP
HCO3 BLDV-SCNC: 22 MMOL/L — SIGNIFICANT CHANGE UP (ref 22–29)
HCT VFR BLD CALC: 41.9 % — SIGNIFICANT CHANGE UP (ref 37–47)
HCT VFR BLDA CALC: 34 % — LOW (ref 34.5–46.5)
HGB BLD CALC-MCNC: 11.2 G/DL — LOW (ref 11.7–16.1)
HGB BLD-MCNC: 14 G/DL — SIGNIFICANT CHANGE UP (ref 12–16)
KETONES UR-MCNC: NEGATIVE — SIGNIFICANT CHANGE UP
LACTATE BLDV-MCNC: 3.5 MMOL/L — HIGH (ref 0.5–2)
LACTATE SERPL-SCNC: 3.8 MMOL/L — HIGH (ref 0.7–2)
LEUKOCYTE ESTERASE UR-ACNC: NEGATIVE — SIGNIFICANT CHANGE UP
LIDOCAIN IGE QN: 74 U/L — HIGH (ref 7–60)
LYMPHOCYTES # BLD AUTO: 0.34 K/UL — LOW (ref 1.2–3.4)
LYMPHOCYTES # BLD AUTO: 21.4 % — SIGNIFICANT CHANGE UP (ref 20.5–51.1)
MANUAL SMEAR VERIFICATION: SIGNIFICANT CHANGE UP
MCHC RBC-ENTMCNC: 30.6 PG — SIGNIFICANT CHANGE UP (ref 27–31)
MCHC RBC-ENTMCNC: 33.4 G/DL — SIGNIFICANT CHANGE UP (ref 32–37)
MCV RBC AUTO: 91.7 FL — SIGNIFICANT CHANGE UP (ref 81–99)
METAMYELOCYTES # FLD: 3.6 % — HIGH (ref 0–0)
MICROCYTES BLD QL: SLIGHT — SIGNIFICANT CHANGE UP
MONOCYTES # BLD AUTO: 0.23 K/UL — SIGNIFICANT CHANGE UP (ref 0.1–0.6)
MONOCYTES NFR BLD AUTO: 14.3 % — HIGH (ref 1.7–9.3)
MYELOCYTES NFR BLD: 5.4 % — HIGH (ref 0–0)
NEUTROPHILS # BLD AUTO: 0.73 K/UL — LOW (ref 1.4–6.5)
NEUTROPHILS NFR BLD AUTO: 39.3 % — LOW (ref 42.2–75.2)
NEUTS BAND # BLD: 7.1 % — HIGH (ref 0–6)
NITRITE UR-MCNC: NEGATIVE — SIGNIFICANT CHANGE UP
NRBC # BLD: 3 /100 — HIGH (ref 0–0)
NRBC # BLD: SIGNIFICANT CHANGE UP /100 WBCS (ref 0–0)
OVALOCYTES BLD QL SMEAR: SLIGHT — SIGNIFICANT CHANGE UP
PCO2 BLDV: 41 MMHG — SIGNIFICANT CHANGE UP (ref 39–42)
PH BLDV: 7.33 — SIGNIFICANT CHANGE UP (ref 7.32–7.43)
PH UR: 6 — SIGNIFICANT CHANGE UP (ref 5–8)
PLAT MORPH BLD: NORMAL — SIGNIFICANT CHANGE UP
PLATELET # BLD AUTO: 182 K/UL — SIGNIFICANT CHANGE UP (ref 130–400)
PO2 BLDV: 31 MMHG — SIGNIFICANT CHANGE UP
POLYCHROMASIA BLD QL SMEAR: SLIGHT — SIGNIFICANT CHANGE UP
POTASSIUM BLDV-SCNC: 3.1 MMOL/L — LOW (ref 3.5–5.1)
POTASSIUM SERPL-MCNC: 4.4 MMOL/L — SIGNIFICANT CHANGE UP (ref 3.5–5)
POTASSIUM SERPL-SCNC: 4.4 MMOL/L — SIGNIFICANT CHANGE UP (ref 3.5–5)
PROT SERPL-MCNC: 6.7 G/DL — SIGNIFICANT CHANGE UP (ref 6–8)
PROT UR-MCNC: SIGNIFICANT CHANGE UP
RBC # BLD: 4.57 M/UL — SIGNIFICANT CHANGE UP (ref 4.2–5.4)
RBC # FLD: 14.2 % — SIGNIFICANT CHANGE UP (ref 11.5–14.5)
RBC BLD AUTO: ABNORMAL
SAO2 % BLDV: 49.8 % — SIGNIFICANT CHANGE UP
SARS-COV-2 RNA SPEC QL NAA+PROBE: SIGNIFICANT CHANGE UP
SCHISTOCYTES BLD QL AUTO: SLIGHT — SIGNIFICANT CHANGE UP
SODIUM SERPL-SCNC: 139 MMOL/L — SIGNIFICANT CHANGE UP (ref 135–146)
SP GR SPEC: 1.05 — HIGH (ref 1.01–1.03)
TOXIN A AND B: NOT DETECTED
TROPONIN T SERPL-MCNC: <0.01 NG/ML — SIGNIFICANT CHANGE UP
UROBILINOGEN FLD QL: SIGNIFICANT CHANGE UP
VARIANT LYMPHS # BLD: 1.8 % — SIGNIFICANT CHANGE UP (ref 0–5)
WBC # BLD: 1.58 K/UL — LOW (ref 4.8–10.8)
WBC # FLD AUTO: 1.58 K/UL — LOW (ref 4.8–10.8)

## 2021-12-14 PROCEDURE — 70450 CT HEAD/BRAIN W/O DYE: CPT | Mod: 26,MA

## 2021-12-14 PROCEDURE — 74177 CT ABD & PELVIS W/CONTRAST: CPT | Mod: 26,MA

## 2021-12-14 PROCEDURE — 99223 1ST HOSP IP/OBS HIGH 75: CPT

## 2021-12-14 PROCEDURE — 99285 EMERGENCY DEPT VISIT HI MDM: CPT

## 2021-12-14 RX ORDER — FAMOTIDINE 10 MG/ML
20 INJECTION INTRAVENOUS ONCE
Refills: 0 | Status: COMPLETED | OUTPATIENT
Start: 2021-12-14 | End: 2021-12-14

## 2021-12-14 RX ORDER — LABETALOL HCL 100 MG
100 TABLET ORAL
Refills: 0 | Status: DISCONTINUED | OUTPATIENT
Start: 2021-12-14 | End: 2021-12-14

## 2021-12-14 RX ORDER — VALACYCLOVIR 500 MG/1
1000 TABLET, FILM COATED ORAL EVERY 24 HOURS
Refills: 0 | Status: DISCONTINUED | OUTPATIENT
Start: 2021-12-14 | End: 2021-12-16

## 2021-12-14 RX ORDER — LETROZOLE 2.5 MG/1
2.5 TABLET, FILM COATED ORAL DAILY
Refills: 0 | Status: DISCONTINUED | OUTPATIENT
Start: 2021-12-14 | End: 2021-12-20

## 2021-12-14 RX ORDER — HEPARIN SODIUM 5000 [USP'U]/ML
5000 INJECTION INTRAVENOUS; SUBCUTANEOUS EVERY 12 HOURS
Refills: 0 | Status: DISCONTINUED | OUTPATIENT
Start: 2021-12-14 | End: 2021-12-20

## 2021-12-14 RX ORDER — BUPROPION HYDROCHLORIDE 150 MG/1
300 TABLET, EXTENDED RELEASE ORAL DAILY
Refills: 0 | Status: DISCONTINUED | OUTPATIENT
Start: 2021-12-14 | End: 2021-12-20

## 2021-12-14 RX ORDER — ONDANSETRON 8 MG/1
4 TABLET, FILM COATED ORAL ONCE
Refills: 0 | Status: COMPLETED | OUTPATIENT
Start: 2021-12-14 | End: 2021-12-14

## 2021-12-14 RX ORDER — PANTOPRAZOLE SODIUM 20 MG/1
40 TABLET, DELAYED RELEASE ORAL DAILY
Refills: 0 | Status: DISCONTINUED | OUTPATIENT
Start: 2021-12-14 | End: 2021-12-20

## 2021-12-14 RX ORDER — SODIUM CHLORIDE 9 MG/ML
1000 INJECTION INTRAMUSCULAR; INTRAVENOUS; SUBCUTANEOUS ONCE
Refills: 0 | Status: COMPLETED | OUTPATIENT
Start: 2021-12-14 | End: 2021-12-14

## 2021-12-14 RX ORDER — ATORVASTATIN CALCIUM 80 MG/1
80 TABLET, FILM COATED ORAL AT BEDTIME
Refills: 0 | Status: DISCONTINUED | OUTPATIENT
Start: 2021-12-14 | End: 2021-12-20

## 2021-12-14 RX ORDER — ACETAMINOPHEN 500 MG
650 TABLET ORAL EVERY 6 HOURS
Refills: 0 | Status: DISCONTINUED | OUTPATIENT
Start: 2021-12-14 | End: 2021-12-20

## 2021-12-14 RX ORDER — FLUCONAZOLE 150 MG/1
200 TABLET ORAL EVERY 24 HOURS
Refills: 0 | Status: DISCONTINUED | OUTPATIENT
Start: 2021-12-14 | End: 2021-12-16

## 2021-12-14 RX ORDER — PANTOPRAZOLE SODIUM 20 MG/1
40 TABLET, DELAYED RELEASE ORAL
Refills: 0 | Status: DISCONTINUED | OUTPATIENT
Start: 2021-12-14 | End: 2021-12-14

## 2021-12-14 RX ORDER — LOSARTAN POTASSIUM 100 MG/1
100 TABLET, FILM COATED ORAL DAILY
Refills: 0 | Status: DISCONTINUED | OUTPATIENT
Start: 2021-12-14 | End: 2021-12-20

## 2021-12-14 RX ORDER — METOCLOPRAMIDE HCL 10 MG
10 TABLET ORAL ONCE
Refills: 0 | Status: COMPLETED | OUTPATIENT
Start: 2021-12-14 | End: 2021-12-14

## 2021-12-14 RX ORDER — VALACYCLOVIR 500 MG/1
1000 TABLET, FILM COATED ORAL ONCE
Refills: 0 | Status: DISCONTINUED | OUTPATIENT
Start: 2021-12-14 | End: 2021-12-14

## 2021-12-14 RX ORDER — ONDANSETRON 8 MG/1
4 TABLET, FILM COATED ORAL EVERY 6 HOURS
Refills: 0 | Status: DISCONTINUED | OUTPATIENT
Start: 2021-12-14 | End: 2021-12-20

## 2021-12-14 RX ORDER — AMITRIPTYLINE HCL 25 MG
25 TABLET ORAL AT BEDTIME
Refills: 0 | Status: DISCONTINUED | OUTPATIENT
Start: 2021-12-14 | End: 2021-12-20

## 2021-12-14 RX ORDER — DULOXETINE HYDROCHLORIDE 30 MG/1
60 CAPSULE, DELAYED RELEASE ORAL DAILY
Refills: 0 | Status: DISCONTINUED | OUTPATIENT
Start: 2021-12-14 | End: 2021-12-20

## 2021-12-14 RX ORDER — SODIUM CHLORIDE 9 MG/ML
1000 INJECTION INTRAMUSCULAR; INTRAVENOUS; SUBCUTANEOUS
Refills: 0 | Status: DISCONTINUED | OUTPATIENT
Start: 2021-12-14 | End: 2021-12-17

## 2021-12-14 RX ADMIN — SODIUM CHLORIDE 1000 MILLILITER(S): 9 INJECTION INTRAMUSCULAR; INTRAVENOUS; SUBCUTANEOUS at 16:41

## 2021-12-14 RX ADMIN — DULOXETINE HYDROCHLORIDE 60 MILLIGRAM(S): 30 CAPSULE, DELAYED RELEASE ORAL at 21:33

## 2021-12-14 RX ADMIN — ONDANSETRON 4 MILLIGRAM(S): 8 TABLET, FILM COATED ORAL at 15:22

## 2021-12-14 RX ADMIN — ONDANSETRON 4 MILLIGRAM(S): 8 TABLET, FILM COATED ORAL at 16:36

## 2021-12-14 RX ADMIN — FAMOTIDINE 100 MILLIGRAM(S): 10 INJECTION INTRAVENOUS at 16:41

## 2021-12-14 RX ADMIN — SODIUM CHLORIDE 1000 MILLILITER(S): 9 INJECTION INTRAMUSCULAR; INTRAVENOUS; SUBCUTANEOUS at 15:21

## 2021-12-14 RX ADMIN — FLUCONAZOLE 100 MILLIGRAM(S): 150 TABLET ORAL at 21:33

## 2021-12-14 RX ADMIN — Medication 104 MILLIGRAM(S): at 16:41

## 2021-12-14 RX ADMIN — HEPARIN SODIUM 5000 UNIT(S): 5000 INJECTION INTRAVENOUS; SUBCUTANEOUS at 21:43

## 2021-12-14 RX ADMIN — ONDANSETRON 4 MILLIGRAM(S): 8 TABLET, FILM COATED ORAL at 21:33

## 2021-12-14 RX ADMIN — LOSARTAN POTASSIUM 100 MILLIGRAM(S): 100 TABLET, FILM COATED ORAL at 21:34

## 2021-12-14 RX ADMIN — ATORVASTATIN CALCIUM 80 MILLIGRAM(S): 80 TABLET, FILM COATED ORAL at 21:34

## 2021-12-14 NOTE — H&P ADULT - HISTORY OF PRESENT ILLNESS
Patient is a 71 year old female with PMH of IBS, pancreatitis, Breast Ca s/p radiation on Letrozole presents for persistent nausea and vomiting. HPI starts on 12/9 when patient was sent to the hospital by  for  severe александр esophagitis. Patient's symptoms started after she returned from [place] on 11/14. She had many bouts of n/v with the absence of BMs. Patient came to the ED 11/26 for abdominal distension and inability to have a BM, CT AP did not show evidence of bowel obstruction or pneumoperitoneum at that time. Patient had recent EGD with Dr. Babin on 12/3/21 which showed esophageal candidiasis and was started on PO diflucan. Patient was not responding to PO antifungals so she was sent in for further work up on 12/9. 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 Patients nausea was treated with zofran, and she was discharged with rx of PO zofran. During this admission patient was also found to be neutropenic, she was evaluated by hem onc and given Neupogen. Since discharge on 12/12/21, patient has had worsening nausea and vomiting unable to tolerated PO.    In the ED, /77, HR 88, RR 20, SpO2 99% on RA, afebrile. CT Abdomen and Pelvis with IV contrast showed no acute intra-abdominal pathology.  Labs showed leukopenia and lactate3.8. Patient received 2L of IVF and anti-emetics in ED. Admitted to medicine for further management of candida esophagitis.

## 2021-12-14 NOTE — ED ADULT TRIAGE NOTE - RESPIRATORY RATE (BREATHS/MIN)
Intake talked with Dr Perrin who recommended IP at [facility] for depression and detox.  [facility] has a bed available later today, they have no beds available at this time.   20

## 2021-12-14 NOTE — ED PROVIDER NOTE - PROGRESS NOTE DETAILS
I have personally performed a history and physical exam on this patient and personally directed the management of the patient. 72yo woman h/o breast CA on chemo, recent admission for severe candidal esophagitis, neutropenia, discharged a few days ago now returns for persistent nausea and vomiting. No fever, some abd pain, but major complaint is vomiting. On exam VS as noted, pt looks uncomfortable, actively vomiting, lungs CTA, CVS1S2 RRR abd soft, mild diffuse tenderness, no peritoneal signs. Will check labs, manage symptoms, imaging, likely admission.

## 2021-12-14 NOTE — ED PROVIDER NOTE - OBJECTIVE STATEMENT
71 y.o. F, pmh of breast ca, pancreatitis, ibs , recently dc'd from hospital for sepsis 2/2 candidal esophagitis here with 2 days of persistent N/V and diarrhea despite zofran po at home. + decreased po intake. No fevers or chills no cp or sob.

## 2021-12-14 NOTE — ED PROVIDER NOTE - PHYSICAL EXAMINATION
Physical Exam: CONSTITUTIONAL: No acute distress, well-developed, well-groomed, AAOx3  HEAD: Atraumatic, normocephalic  EYES: EOM intact, PERRLA, conjunctiva and sclera clear  ENT: Supple, no masses, no thyromegaly, no bruits, no JVD; moist mucous membranes  PULMONARY: Clear to auscultation bilaterally; no wheezes, rales, or rhonchi  CARDIOVASCULAR: Regular rate and rhythm; no murmurs, rubs, or gallops  GASTROINTESTINAL: Soft, non-tender, non-distended; bowel sounds present  MUSCULOSKELETAL: 2+ peripheral pulses; no clubbing, no cyanosis, no edema  NEUROLOGY: non-focal  SKIN: No rashes or lesions; warm and dry

## 2021-12-14 NOTE — ED PROVIDER NOTE - CARE PLAN
1 Principal Discharge DX:	Decreased oral intake  Secondary Diagnosis:	Intractable nausea and vomiting  Secondary Diagnosis:	Weakness

## 2021-12-14 NOTE — H&P ADULT - ATTENDING COMMENTS
Patient seen and examined on 12/14/2021 at 21:50    HPI:  71 year old female with PMH of IBS, neutropenia (unknown cause), esophageal candidiasis (currently on fluconazole), HSV (on valcyclovir), hx of pancreatitis, Breast Ca s/p radiation on Letrozole admitted for persistent nausea and vomiting. Additionally she notes constipation and abdominal distention for the last 4 days, which resolved today when she started having soft bowel movements (no straining) since coming to the ED. Her nausea was not improved with sublingual zofran at home though has been improved with IV zofran. She reports no significant odynophagia and able to swallow soft foods without difficulty but has poor oral intake due to the vomiting.   Her symptoms began after returning from [place] back in Nov (see above notes for details). Recent hospitalization notable for esophageal candidiasis follow EGD, HSV on buttocks (on valcyclovir), and neutropenia which responded to GCS stimulator. She has not followed back up with Heme-onc yet.   Her IBS is typically consists of multiple soft bowel movements per day (~6). Reports no abdominal pain currently, having bowel movements with ease, and zofran IV is controlling her nausea.   CBC showed recurrence of isolated neutropenia. HAGMA with lactic acidosis.  CT a/p and CTH negative for acute changes.      REVIEW OF SYSTEMS:  CONSTITUTIONAL:  No weakness, fevers, chills, night sweats, weight loss  EYES/ENT: No visual changes. No vertigo or dysphagia  NECK: No neck pain or stiffness  RESPIRATORY: No cough, wheezing, hemoptysis. No shortness of breath  CARDIOVASCULAR: No chest pain or palpitations. No lower extremity edema  GASTROINTESTINAL: +abdominal pain, constipation, nausea, vomiting, no diarrhea, or hematemesis  GENITOURINARY: No dysuria or hematuria   NEUROLOGICAL: No focal numbness or weakness  SKIN: No rashes or itching  HEMATOLOGIC: No easy bruising or prolonged bleeding.      PHYSICAL EXAM:  GENERAL: NAD, obese, Non-toxic, stated age   HEAD:  Atraumatic, Normocephalic  EYES: EOMI, Sclera White   NECK: Supple, No JVD  CHEST/LUNG: Clear to auscultation bilaterally; No wheezing, rhonchi, or crackles  HEART: Regular rate and rhythm; s1, s2, No murmurs, rubs, or gallops  ABDOMEN: Soft, Nontender, distended; Bowel sounds present, No rebound or guarding noted   EXTREMITIES:  No lower extremity edema or calf tenderness to palpation.  No clubbing or cyanosis  PSYCH: AAOx3, pleasant, cooperative, not anxious  NEUROLOGY: 5/5 strength in all extremities, no downward drift. Sensation grossly intact.   SKIN: No rashes or lesions      ASSESSMENT AND PLAN:  Persistent nausea/vomiting --> ?related to constipation/IBS  Constipation --> now resolved  Hx of IBS   Esophageal candidiasis   -Cont with fluconazole IV until consistently tolerating PO, then can transition back to oral   -Start senna, trial of bentyl, simethicone  -Cont IV zofran   -GI eval   -Full liquid diet for now, advance as tolerated   -Monitor for diarrhea (reporting formed but soft stool, so I doubt overflow incontinence)     HAGMA w/Lactic acidosis: no hypotension or sepsis   -Cont IV hydration   -Repeat lactate   -bicarb 14 --> ph is 7.33. hold off on supplementation for now. Repeat CMP when n/v controlled and lactate normalized     Leukopenia: source unclear  -Needs further heme-onc w/ Dr. Mcdonough work up as an out patient (has Dec 22nd appointment)  -HIV, AFB, negative. Previous stool studies negative (culture still pending final results)  -Other cell lines maintained, previous CT chest negative for LAD  -Letrozole does not cause leukopenia    HSV: cont with valacyclovir    Breast cancer: Cont with letrozole   -Heme-onc out patient follow up     Depression  Fibromyalgia   -Cont with amitriptyline, duloxetine, and buproprion    HTN/HLD: cont losartan and atorvastatin     DVT ppx: Lovenox/Heparin  GI ppx: Not indicated  GOC: Full code.     My note supersedes the residents in the event of a discrepancy. Patient seen and examined on 12/14/2021 at 21:50    HPI:  71 year old female with PMH of IBS, neutropenia (unknown cause), esophageal candidiasis (currently on fluconazole), HSV (on valcyclovir), hx of pancreatitis, Breast Ca s/p radiation on Letrozole admitted for persistent nausea and vomiting. Additionally she notes constipation and abdominal distention for the last 4 days, which resolved today when she started having soft bowel movements (no straining) since coming to the ED. Her nausea was not improved with sublingual zofran at home though has been improved with IV zofran. She reports no significant odynophagia and able to swallow soft foods without difficulty but has poor oral intake due to the vomiting.   Her symptoms began after returning from [place] back in Nov (see above notes for details). Recent hospitalization notable for esophageal candidiasis follow EGD, HSV on buttocks (on valcyclovir), and neutropenia which responded to GCS stimulator. She has not followed back up with Heme-onc yet.   Her IBS is typically consists of multiple soft bowel movements per day (~6). Reports no abdominal pain currently, having bowel movements with ease, and zofran IV is controlling her nausea.   CBC showed recurrence of isolated neutropenia. HAGMA with lactic acidosis.  CT a/p and CTH negative for acute changes.      REVIEW OF SYSTEMS:  CONSTITUTIONAL:  No weakness, fevers, chills, night sweats, weight loss  EYES/ENT: No visual changes. No vertigo or dysphagia  NECK: No neck pain or stiffness  RESPIRATORY: No cough, wheezing, hemoptysis. No shortness of breath  CARDIOVASCULAR: No chest pain or palpitations. No lower extremity edema  GASTROINTESTINAL: +abdominal pain, constipation, nausea, vomiting, no diarrhea, or hematemesis  GENITOURINARY: No dysuria or hematuria   NEUROLOGICAL: No focal numbness or weakness  SKIN: No rashes or itching  HEMATOLOGIC: No easy bruising or prolonged bleeding.      PHYSICAL EXAM:  GENERAL: NAD, obese, Non-toxic, stated age   HEAD:  Atraumatic, Normocephalic  EYES: EOMI, Sclera White   NECK: Supple, No JVD  CHEST/LUNG: Clear to auscultation bilaterally; No wheezing, rhonchi, or crackles  HEART: Regular rate and rhythm; s1, s2, No murmurs, rubs, or gallops  ABDOMEN: Soft, Nontender, distended; Bowel sounds present, No rebound or guarding noted   EXTREMITIES:  No lower extremity edema or calf tenderness to palpation.  No clubbing or cyanosis  PSYCH: AAOx3, pleasant, cooperative, not anxious  NEUROLOGY: 5/5 strength in all extremities, no downward drift. Sensation grossly intact.   SKIN: No rashes or lesions      ASSESSMENT AND PLAN:  Persistent nausea/vomiting --> ?related to constipation/IBS  Constipation --> now resolved  Hx of IBS   Esophageal candidiasis   -Cont with fluconazole IV until consistently tolerating PO, then can transition back to oral   -Start senna, trial of bentyl, simethicone  -Cont IV zofran and home ppi  -GI eval   -Full liquid diet for now, advance as tolerated   -Monitor for diarrhea (reporting formed but soft stool, so I doubt overflow incontinence)     HAGMA w/Lactic acidosis: no hypotension or sepsis   -Cont IV hydration   -Repeat lactate   -bicarb 14 --> ph is 7.33. hold off on supplementation for now. Repeat CMP when n/v controlled and lactate normalized     Leukopenia: source unclear  -Needs further heme-onc w/ Dr. Mcdonough work up as an out patient (has Dec 22nd appointment)  -HIV, AFB, negative. Previous stool studies negative (culture still pending final results)  -Other cell lines maintained, previous CT chest negative for LAD  -Letrozole does not cause leukopenia    HSV: cont with valacyclovir    Breast cancer: Cont with letrozole   -Heme-onc out patient follow up     Depression  Fibromyalgia   -Cont with amitriptyline, duloxetine, and buproprion    HTN/HLD: cont losartan and atorvastatin     DVT ppx: Lovenox/Heparin  GI ppx: home ppi  GOC: Full code.     My note supersedes the residents in the event of a discrepancy.

## 2021-12-14 NOTE — H&P ADULT - NSHPREVIEWOFSYSTEMS_GEN_ALL_CORE
CONSTITUTIONAL: No weakness, fevers or chills; No headaches  EYES: No visual changes, eye pain, or discharge  ENT: No vertigo; No ear pain or change in hearing; No sore throat or difficulty swallowing  NECK: No pain or stiffness  RESPIRATORY: No cough, wheezing, or hemoptysis; No shortness of breath  CARDIOVASCULAR: No chest pain or palpitations  GASTROINTESTINAL:+ N/V   GENITOURINARY: No dysuria, frequency or hematuria  MUSCULOSKELETAL: No joint pain, no muscle pain, no weakness  NEUROLOGICAL: No numbness or weakness  SKIN: No itching or rashes

## 2021-12-14 NOTE — H&P ADULT - ASSESSMENT
Patient is a 71 year old female with PMH of IBS, pancreatitis, Breast Ca s/p radiation on Letrozole presents for persistent nausea and vomiting. HPI starts on 12/9 when patient was sent to the hospital by  for  severe александр esophagitis. Patient's symptoms started after she returned from [place] on 11/14. She had many bouts of n/v with the absence of BMs. Patient came to the ED 11/26 for abdominal distension and inability to have a BM, CT AP did not show evidence of bowel obstruction or pneumoperitoneum at that time. Patient had recent EGD with Dr. Babin on 12/3/21 which showed esophageal candidiasis and was started on PO diflucan. Patient was not responding to PO antifungals so she was sent in for further work up on 12/9. 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 Patients nausea was treated with zofran, and she was discharged with rx of PO zofran. During this admission patient was also found to be neutropenic, she was evaluated by hem onc and given Neupogen. Since discharge on 12/12/21, patient has had worsening nausea and vomiting unable to tolerated PO.    In the ED, /77, HR 88, RR 20, SpO2 99% on RA, afebrile. CT Abdomen and Pelvis with IV contrast showed no acute intra-abdominal pathology.  Labs showed leukopenia and lactate3.8. Patient received 2L of IVF and anti-emetics in ED. Admitted to medicine for further management of candida esophagitis.       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 Guzman)   - WBC improved temporarily on recent admission due to Neupogen x1  - CT chest noted, patient will follow up with heme/onc as outpatient   - consider Hem Onc consult for need for further Neupogen given leukopenia     #HTN   - c/w losartan 100mg    #HLD  - continue with atorvastatin while inpatient     #Depression  - continue with wellbutrin xL 300mg PO daily     #Fibromyalgia  - continue with duloxetine 60mg PO daily     #Misc   - DVT ppx: HSQ   - GI ppx: protonix   - Activity: IAT  - Diet: CLD, advance as tolerated   - Dispo: acute, from home   - Code status: full code  - Med Rec: completed with patient  Patient is a 71 year old female with PMH of IBS, pancreatitis, Breast Ca s/p radiation on Letrozole presents for persistent nausea and vomiting. HPI starts on 12/9 when patient was sent to the hospital by  for  severe александр esophagitis. Patient's symptoms started after she returned from [place] on 11/14. She had many bouts of n/v with the absence of BMs. Patient came to the ED 11/26 for abdominal distension and inability to have a BM, CT AP did not show evidence of bowel obstruction or pneumoperitoneum at that time. Patient had recent EGD with Dr. Babin on 12/3/21 which showed esophageal candidiasis and was started on PO diflucan. Patient was not responding to PO antifungals so she was sent in for further work up on 12/9. 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 Patients nausea was treated with zofran, and she was discharged with rx of PO zofran. During this admission patient was also found to be neutropenic, she was evaluated by hem onc and given Neupogen. Since discharge on 12/12/21, patient has had worsening nausea and vomiting unable to tolerated PO.    In the ED, /77, HR 88, RR 20, SpO2 99% on RA, afebrile. CT Abdomen and Pelvis with IV contrast showed no acute intra-abdominal pathology.  Labs showed leukopenia and lactate3.8. Patient received 2L of IVF and anti-emetics in ED. Admitted to medicine for further management of candida esophagitis.       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 Guzman)   - WBC improved temporarily on recent admission due to Neupogen x1  - CT chest noted, patient will follow up with heme/onc as outpatient   - consider Hem Onc consult for need for further Neupogen given leukopenia     #HTN   - c/w losartan 100mg    #HLD  - continue with atorvastatin while inpatient     #Depression  - continue with wellbutrin xL 300mg PO daily     #Fibromyalgia  - continue with duloxetine 60mg PO daily     #Misc   - DVT ppx: HSQ   - GI ppx: protonix   - Activity: IAT  - Diet: CLD, advance as tolerated   - Dispo: acute, from home   - Code status: full code  - Med Rec: completed with patient

## 2021-12-14 NOTE — H&P ADULT - NSHPLABSRESULTS_GEN_ALL_CORE
VITAL SIGNS: Last 24 Hours  T(C): 35.6 (14 Dec 2021 14:19), Max: 35.6 (14 Dec 2021 14:19)  T(F): 96 (14 Dec 2021 14:19), Max: 96 (14 Dec 2021 14:19)  HR: 90 (14 Dec 2021 14:19) (90 - 90)  BP: 115/79 (14 Dec 2021 14:19) (115/79 - 115/79)  BP(mean): --  RR: 20 (14 Dec 2021 14:19) (20 - 20)  SpO2: 98% (14 Dec 2021 14:19) (98% - 98%)    LABS:                        14.0   1.58  )-----------( 182      ( 14 Dec 2021 15:16 )             41.9     12-14    139  |  102  |  17  ----------------------------<  133<H>  4.4   |  14<L>  |  0.9    Ca    9.6      14 Dec 2021 15:16    TPro  6.7  /  Alb  4.3  /  TBili  0.4  /  DBili  x   /  AST  20  /  ALT  13  /  AlkPhos  128<H>  12-14          Lactate, Blood: 3.8 mmol/L *H* (12-14-21 @ 17:12)  Troponin T, Serum: <0.01 ng/mL (12-14-21 @ 15:16)      CARDIAC MARKERS ( 14 Dec 2021 15:16 )  x     / <0.01 ng/mL / x     / x     / x

## 2021-12-14 NOTE — ED PROVIDER NOTE - CLINICAL SUMMARY MEDICAL DECISION MAKING FREE TEXT BOX
yes
71-year-old female presents to the ED for recurrent nausea vomiting.  Patient was recently scoped by GI with a diagnosis of esophagitis.  Patient was evaluated by the initial team who ordered labs and CT imaging.  Labs reviewed noted to have neutropenia which is consistent with baseline.,  Elevated lactate–3.8.  CT abdomen pelvis negative for intra-abdominal pathology.  CTH neg for ich, midline shift, or hydrocephalus.  Patient noted to have recurrent nausea vomiting for which fluids and Zofran were administered multiple times.  On reevaluation patient feels slightly better.  Patient still unable to tolerate p.o.  Decision made to admit the patient for esophagitis and inability to tolerate p.o.

## 2021-12-15 LAB
ALBUMIN SERPL ELPH-MCNC: 3.5 G/DL — SIGNIFICANT CHANGE UP (ref 3.5–5.2)
ALP SERPL-CCNC: 70 U/L — SIGNIFICANT CHANGE UP (ref 30–115)
ALT FLD-CCNC: 11 U/L — SIGNIFICANT CHANGE UP (ref 0–41)
ANION GAP SERPL CALC-SCNC: 15 MMOL/L — HIGH (ref 7–14)
AST SERPL-CCNC: 33 U/L — SIGNIFICANT CHANGE UP (ref 0–41)
BASOPHILS # BLD AUTO: 0.03 K/UL — SIGNIFICANT CHANGE UP (ref 0–0.2)
BASOPHILS NFR BLD AUTO: 0.7 % — SIGNIFICANT CHANGE UP (ref 0–1)
BILIRUB SERPL-MCNC: 0.5 MG/DL — SIGNIFICANT CHANGE UP (ref 0.2–1.2)
BUN SERPL-MCNC: 13 MG/DL — SIGNIFICANT CHANGE UP (ref 10–20)
C DIFF BY PCR RESULT: NEGATIVE — SIGNIFICANT CHANGE UP
C DIFF TOX GENS STL QL NAA+PROBE: SIGNIFICANT CHANGE UP
CALCIUM SERPL-MCNC: 8.5 MG/DL — SIGNIFICANT CHANGE UP (ref 8.5–10.1)
CHLORIDE SERPL-SCNC: 108 MMOL/L — SIGNIFICANT CHANGE UP (ref 98–110)
CO2 SERPL-SCNC: 14 MMOL/L — LOW (ref 17–32)
CREAT SERPL-MCNC: 0.9 MG/DL — SIGNIFICANT CHANGE UP (ref 0.7–1.5)
EOSINOPHIL # BLD AUTO: 0.13 K/UL — SIGNIFICANT CHANGE UP (ref 0–0.7)
EOSINOPHIL NFR BLD AUTO: 2.9 % — SIGNIFICANT CHANGE UP (ref 0–8)
GLUCOSE SERPL-MCNC: 88 MG/DL — SIGNIFICANT CHANGE UP (ref 70–99)
HCT VFR BLD CALC: 36.1 % — LOW (ref 37–47)
HCV AB S/CO SERPL IA: 0.04 COI — SIGNIFICANT CHANGE UP
HCV AB SERPL-IMP: SIGNIFICANT CHANGE UP
HGB BLD-MCNC: 11.7 G/DL — LOW (ref 12–16)
IMM GRANULOCYTES NFR BLD AUTO: 3.8 % — HIGH (ref 0.1–0.3)
LACTATE SERPL-SCNC: 0.8 MMOL/L — SIGNIFICANT CHANGE UP (ref 0.7–2)
LYMPHOCYTES # BLD AUTO: 0.94 K/UL — LOW (ref 1.2–3.4)
LYMPHOCYTES # BLD AUTO: 21 % — SIGNIFICANT CHANGE UP (ref 20.5–51.1)
MAGNESIUM SERPL-MCNC: 1.6 MG/DL — LOW (ref 1.8–2.4)
MCHC RBC-ENTMCNC: 30.9 PG — SIGNIFICANT CHANGE UP (ref 27–31)
MCHC RBC-ENTMCNC: 32.4 G/DL — SIGNIFICANT CHANGE UP (ref 32–37)
MCV RBC AUTO: 95.3 FL — SIGNIFICANT CHANGE UP (ref 81–99)
MONOCYTES # BLD AUTO: 0.42 K/UL — SIGNIFICANT CHANGE UP (ref 0.1–0.6)
MONOCYTES NFR BLD AUTO: 9.4 % — HIGH (ref 1.7–9.3)
NEUTROPHILS # BLD AUTO: 2.78 K/UL — SIGNIFICANT CHANGE UP (ref 1.4–6.5)
NEUTROPHILS NFR BLD AUTO: 62.2 % — SIGNIFICANT CHANGE UP (ref 42.2–75.2)
NRBC # BLD: 0 /100 WBCS — SIGNIFICANT CHANGE UP (ref 0–0)
PHOSPHATE SERPL-MCNC: 2.7 MG/DL — SIGNIFICANT CHANGE UP (ref 2.1–4.9)
PLATELET # BLD AUTO: 177 K/UL — SIGNIFICANT CHANGE UP (ref 130–400)
POTASSIUM SERPL-MCNC: 4.9 MMOL/L — SIGNIFICANT CHANGE UP (ref 3.5–5)
POTASSIUM SERPL-SCNC: 4.9 MMOL/L — SIGNIFICANT CHANGE UP (ref 3.5–5)
PROT SERPL-MCNC: 6.1 G/DL — SIGNIFICANT CHANGE UP (ref 6–8)
RBC # BLD: 3.79 M/UL — LOW (ref 4.2–5.4)
RBC # FLD: 14.4 % — SIGNIFICANT CHANGE UP (ref 11.5–14.5)
SODIUM SERPL-SCNC: 137 MMOL/L — SIGNIFICANT CHANGE UP (ref 135–146)
WBC # BLD: 4.47 K/UL — LOW (ref 4.8–10.8)
WBC # FLD AUTO: 4.47 K/UL — LOW (ref 4.8–10.8)

## 2021-12-15 PROCEDURE — 99233 SBSQ HOSP IP/OBS HIGH 50: CPT

## 2021-12-15 RX ORDER — SIMETHICONE 80 MG/1
80 TABLET, CHEWABLE ORAL THREE TIMES A DAY
Refills: 0 | Status: DISCONTINUED | OUTPATIENT
Start: 2021-12-15 | End: 2021-12-20

## 2021-12-15 RX ORDER — ALPRAZOLAM 0.25 MG
0.5 TABLET ORAL ONCE
Refills: 0 | Status: DISCONTINUED | OUTPATIENT
Start: 2021-12-15 | End: 2021-12-15

## 2021-12-15 RX ORDER — MAGNESIUM SULFATE 500 MG/ML
2 VIAL (ML) INJECTION
Refills: 0 | Status: COMPLETED | OUTPATIENT
Start: 2021-12-15 | End: 2021-12-15

## 2021-12-15 RX ORDER — CEFEPIME 1 G/1
INJECTION, POWDER, FOR SOLUTION INTRAMUSCULAR; INTRAVENOUS
Refills: 0 | Status: DISCONTINUED | OUTPATIENT
Start: 2021-12-15 | End: 2021-12-16

## 2021-12-15 RX ORDER — SENNA PLUS 8.6 MG/1
2 TABLET ORAL AT BEDTIME
Refills: 0 | Status: DISCONTINUED | OUTPATIENT
Start: 2021-12-15 | End: 2021-12-15

## 2021-12-15 RX ORDER — CEFEPIME 1 G/1
1000 INJECTION, POWDER, FOR SOLUTION INTRAMUSCULAR; INTRAVENOUS ONCE
Refills: 0 | Status: COMPLETED | OUTPATIENT
Start: 2021-12-15 | End: 2021-12-15

## 2021-12-15 RX ORDER — CEFEPIME 1 G/1
1000 INJECTION, POWDER, FOR SOLUTION INTRAMUSCULAR; INTRAVENOUS EVERY 8 HOURS
Refills: 0 | Status: DISCONTINUED | OUTPATIENT
Start: 2021-12-16 | End: 2021-12-16

## 2021-12-15 RX ADMIN — Medication 25 GRAM(S): at 23:36

## 2021-12-15 RX ADMIN — SIMETHICONE 80 MILLIGRAM(S): 80 TABLET, CHEWABLE ORAL at 05:53

## 2021-12-15 RX ADMIN — LETROZOLE 2.5 MILLIGRAM(S): 2.5 TABLET, FILM COATED ORAL at 12:52

## 2021-12-15 RX ADMIN — HEPARIN SODIUM 5000 UNIT(S): 5000 INJECTION INTRAVENOUS; SUBCUTANEOUS at 05:57

## 2021-12-15 RX ADMIN — SIMETHICONE 80 MILLIGRAM(S): 80 TABLET, CHEWABLE ORAL at 01:32

## 2021-12-15 RX ADMIN — LETROZOLE 2.5 MILLIGRAM(S): 2.5 TABLET, FILM COATED ORAL at 05:53

## 2021-12-15 RX ADMIN — CEFEPIME 100 MILLIGRAM(S): 1 INJECTION, POWDER, FOR SOLUTION INTRAMUSCULAR; INTRAVENOUS at 21:01

## 2021-12-15 RX ADMIN — PANTOPRAZOLE SODIUM 40 MILLIGRAM(S): 20 TABLET, DELAYED RELEASE ORAL at 12:55

## 2021-12-15 RX ADMIN — BUPROPION HYDROCHLORIDE 300 MILLIGRAM(S): 150 TABLET, EXTENDED RELEASE ORAL at 12:54

## 2021-12-15 RX ADMIN — ATORVASTATIN CALCIUM 80 MILLIGRAM(S): 80 TABLET, FILM COATED ORAL at 21:22

## 2021-12-15 RX ADMIN — Medication 650 MILLIGRAM(S): at 08:15

## 2021-12-15 RX ADMIN — Medication 10 MILLIGRAM(S): at 01:32

## 2021-12-15 RX ADMIN — DULOXETINE HYDROCHLORIDE 60 MILLIGRAM(S): 30 CAPSULE, DELAYED RELEASE ORAL at 12:52

## 2021-12-15 RX ADMIN — Medication 25 MILLIGRAM(S): at 00:40

## 2021-12-15 RX ADMIN — Medication 25 GRAM(S): at 21:28

## 2021-12-15 RX ADMIN — Medication 25 MILLIGRAM(S): at 21:22

## 2021-12-15 RX ADMIN — HEPARIN SODIUM 5000 UNIT(S): 5000 INJECTION INTRAVENOUS; SUBCUTANEOUS at 18:02

## 2021-12-15 RX ADMIN — SODIUM CHLORIDE 75 MILLILITER(S): 9 INJECTION INTRAMUSCULAR; INTRAVENOUS; SUBCUTANEOUS at 00:40

## 2021-12-15 RX ADMIN — Medication 10 MILLIGRAM(S): at 05:56

## 2021-12-15 RX ADMIN — Medication 25 GRAM(S): at 17:58

## 2021-12-15 RX ADMIN — SODIUM CHLORIDE 75 MILLILITER(S): 9 INJECTION INTRAMUSCULAR; INTRAVENOUS; SUBCUTANEOUS at 04:10

## 2021-12-15 RX ADMIN — FLUCONAZOLE 100 MILLIGRAM(S): 150 TABLET ORAL at 21:02

## 2021-12-15 RX ADMIN — Medication 10 MILLIGRAM(S): at 12:54

## 2021-12-15 RX ADMIN — Medication 0.5 MILLIGRAM(S): at 01:37

## 2021-12-15 RX ADMIN — Medication 10 MILLIGRAM(S): at 17:58

## 2021-12-15 RX ADMIN — SIMETHICONE 80 MILLIGRAM(S): 80 TABLET, CHEWABLE ORAL at 21:25

## 2021-12-15 RX ADMIN — SENNA PLUS 2 TABLET(S): 8.6 TABLET ORAL at 01:33

## 2021-12-15 RX ADMIN — SODIUM CHLORIDE 150 MILLILITER(S): 9 INJECTION INTRAMUSCULAR; INTRAVENOUS; SUBCUTANEOUS at 12:48

## 2021-12-15 RX ADMIN — SIMETHICONE 80 MILLIGRAM(S): 80 TABLET, CHEWABLE ORAL at 13:03

## 2021-12-15 NOTE — PROGRESS NOTE ADULT - ASSESSMENT
Patient is a 71 year old female with PMH of IBS, pancreatitis, Breast Ca s/p radiation on Letrozole presents for persistent nausea and vomiting. HPI starts on 12/9 when patient was sent to the hospital by  for  severe александр esophagitis. Patient's symptoms started after she returned from [place] on 11/14. She had many bouts of n/v with the absence of BMs.   In the ED, /77, HR 88, RR 20, SpO2 99% on RA, afebrile. CT Abdomen and Pelvis with IV contrast showed no acute intra-abdominal pathology.  Labs showed leukopenia and lactate3.8. Patient received 2L of IVF and anti-emetics in ED. Admitted to medicine for further management of candida esophagitis.       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 Guzman)   - WBC improved temporarily on recent admission due to Neupogen x1  - CT chest noted, patient will follow up with heme/onc as outpatient   - consider Hem Onc consult for need for further Neupogen given leukopenia     #HTN   - c/w losartan 100mg    #HLD  - continue with atorvastatin while inpatient     #Depression  - continue with wellbutrin xL 300mg PO daily     #Fibromyalgia  - continue with duloxetine 60mg PO daily     #Misc   - DVT ppx: HSQ   - GI ppx: protonix   - Activity: IAT  - Diet: CLD, advance as tolerated   - Dispo: acute, from home   - Code status: full code

## 2021-12-15 NOTE — PATIENT PROFILE ADULT - FALL HARM RISK - UNIVERSAL INTERVENTIONS
Bed in lowest position, wheels locked, appropriate side rails in place/Call bell, personal items and telephone in reach/Instruct patient to call for assistance before getting out of bed or chair/Non-slip footwear when patient is out of bed/[place] to call system/Physically safe environment - no spills, clutter or unnecessary equipment/Purposeful Proactive Rounding/Room/bathroom lighting operational, light cord in reach

## 2021-12-15 NOTE — PATIENT PROFILE ADULT - SAFE PLACE TO LIVE
[facility] [place] INTERNAL MEDICINE  BRAN MARTINEZ M.D.      Brayan Gonzalezali / 43 y.o. / male  07/13/2020      CHIEF COMPLAINT     Hypothyroidism and IFG (impaired fasting glucose)      ASSESSMENT & PLAN     Problem List Items Addressed This Visit        High    Hypothyroidism - Primary (Chronic)    Current Assessment & Plan     Noted wt loss of 23 lbs since 1/2020.   Check TSH and Free T4     Lab Results   Component Value Date    TSH 3.110 01/13/2020    TSH 2.270 08/30/2019    TSH 3.450 04/05/2019    FREET4 1.34 01/13/2020    FREET4 1.34 04/05/2019    FREET4 1.47 07/30/2018             Relevant Medications    levothyroxine (SYNTHROID, LEVOTHROID) 88 MCG tablet    Other Relevant Orders    TSH+Free T4    Rapid weight loss    Current Assessment & Plan     Wt loss of 23 lbs since 1/2020.   Wt Readings from Last 3 Encounters:   07/13/20 64.9 kg (143 lb)   01/13/20 75.3 kg (166 lb)   09/11/19 77.9 kg (171 lb 12.8 oz)        Avoid intermittent fasting. Check lab(s). Monitor closely for ongoing wt loss. Follow-up 3 months.          Relevant Orders    Comprehensive Metabolic Panel    CBC & Differential       Medium    IFG (impaired fasting glucose) (Chronic)    Current Assessment & Plan     Wt loss of 23 lbs since 1/2020 with intermittent fasting and avoidance of rice.   Check A1c. Due to wt loss advised against fasting.           Relevant Orders    Hemoglobin A1c        Orders Placed This Encounter   Procedures   • Hemoglobin A1c   • Comprehensive Metabolic Panel   • TSH+Free T4   • CBC & Differential     No orders of the defined types were placed in this encounter.      Summary/Discussion:  •     Advance Care Planning        Next Appointment with me: Visit date not found    Return in about 3 months (around 10/13/2020) for abnormal weight loss.      MEDICATIONS     Current Outpatient Medications   Medication Sig Dispense Refill   • levothyroxine (SYNTHROID, LEVOTHROID) 88 MCG tablet Take 1 tablet by mouth Every Morning. 30 tablet 3     No  "current facility-administered medications for this visit.           VITAL SIGNS     Visit Vitals  /66   Pulse 56   Temp 96.3 °F (35.7 °C)   Ht 172.7 cm (67.99\")   Wt 64.9 kg (143 lb)   SpO2 99%   BMI 21.75 kg/m²       BP Readings from Last 3 Encounters:   07/13/20 100/66   01/13/20 100/60   09/11/19 122/78     Wt Readings from Last 3 Encounters:   07/13/20 64.9 kg (143 lb)   01/13/20 75.3 kg (166 lb)   09/11/19 77.9 kg (171 lb 12.8 oz)      Body mass index is 21.75 kg/m².      HISTORY OF PRESENT ILLNESS     Hypothyroidism: wt loss of 23 lbs since 1/2020. Reports intermittent fasting and avoidance of eating rice. Weighted < 140 lbs during college years. Denies loss of appetite, night sweat, fatigue, abdominal pain, nausea/vomiting/diarrhea, polyuria. History of hypothyroidism on levothyroxine 88 mcg (stable dose) and history of IFG.       Patient Care Team:  Kyrie Ayala MD as PCP - General (Internal Medicine)      REVIEW OF SYSTEMS     Review of Systems  Wt loss of 23 lbs, no fever, night sweat, loss of appetite  GI neg   neg  Lymph neg   Skin neg   Psych neg       PHYSICAL EXAMINATION     Physical Exam  Constitutional  No distress, noted wt loss   Lymph no cervical, clavicular lymphadenopathy   Skin no rash or jaundice   Cardiovascular Rate  normal . Rhythm: regular . Heart sounds:  normal  Pulmonary/Chest  Effort normal. Breath sounds:  Normal  Abdomen: Soft and nontender, no palpable mass, no hepatomegaly.   Psychiatric  Alert. Judgment and thought content normal. Mood normal     REVIEWED DATA     Labs:     Lab Results   Component Value Date     08/30/2019    K 4.4 08/30/2019    CALCIUM 9.5 08/30/2019    AST 15 08/30/2019    ALT 13 08/30/2019    BUN 10 08/30/2019    CREATININE 0.92 08/30/2019    CREATININE 1.04 03/22/2018    EGFRIFNONA 91 08/30/2019    EGFRIFAFRI 110 08/30/2019       Lab Results   Component Value Date    HGBA1C 5.5 09/11/2019    HGBA1C 4.97 03/22/2018     (H) 08/30/2019    " GLU 98 03/22/2018       Lab Results   Component Value Date    LDL 97 08/30/2019     (H) 03/22/2018    HDL 36 (L) 08/30/2019    HDL 39 (L) 03/22/2018    TRIG 119 08/30/2019    TRIG 71 03/22/2018    CHOLHDLRATIO 4.36 08/30/2019    CHOLHDLRATIO 3.97 03/22/2018       Lab Results   Component Value Date    TSH 3.110 01/13/2020    FREET4 1.34 01/13/2020       Lab Results   Component Value Date    WBC 4.39 08/30/2019    HGB 14.1 08/30/2019    HGB 14.2 03/22/2018     08/30/2019       Lab Results   Component Value Date    PROTEIN Negative 08/30/2019    GLUCOSEU Negative 08/30/2019    BLOODU Negative 08/30/2019    NITRITEU Negative 08/30/2019    LEUKOCYTESUR Negative 08/30/2019       Imaging:         Medical Tests:         Summary of old records / correspondence / consultant report:         Request outside records:         ALLERGIES  No Known Allergies     PFSH:     The following portions of the patient's history were reviewed and updated as appropriate: Allergies / Current Medications / Past Medical History / Surgical History / Social History / Family History    PROBLEM LIST   Patient Active Problem List   Diagnosis   • Hypothyroidism   • Vitamin D deficiency   • IFG (impaired fasting glucose)   • Rapid weight loss       PAST MEDICAL HISTORY  Past Medical History:   Diagnosis Date   • Hypothyroidism    • Kidney stone 2008    passed       SURGICAL HISTORY  Past Surgical History:   Procedure Laterality Date   • CIRCUMCISION  2009       SOCIAL HISTORY  Social History     Socioeconomic History   • Marital status:      Spouse name: Mikki   • Number of children: 2   • Years of education: Not on file   • Highest education level: Not on file   Occupational History   • Occupation:  ([employer])   Tobacco Use   • Smoking status: Never Smoker   • Smokeless tobacco: Never Used   Substance and Sexual Activity   • Alcohol use: Yes     Comment: occ   • Drug use: No   • Sexual activity: Yes     Partners: Female      Birth control/protection: Surgical   Lifestyle   • Physical activity:     Days per week: 7 days     Minutes per session: Not on file   • Stress: To some extent       FAMILY HISTORY  Family History   Problem Relation Age of Onset   • Hypertension Father    • Coronary artery disease Father 50        s/p cabg   • No Known Problems Mother    • No Known Problems Sister    • No Known Problems Maternal Grandmother    • No Known Problems Maternal Grandfather    • No Known Problems Paternal Grandmother    • Hypertension Paternal Grandfather    • Coronary artery disease Paternal Aunt    • Diabetes Neg Hx    • Thyroid disease Neg Hx    • Cancer Neg Hx          **Dragon Disclaimer:   Much of this encounter note is an electronic transcription/translation of spoken language to printed text. The electronic translation of spoken language may permit erroneous, or at times, nonsensical words or phrases to be inadvertently transcribed. Although I have reviewed the note for such errors, some may still exist.     Template created by Milton Ayala MD      no

## 2021-12-15 NOTE — PROGRESS NOTE ADULT - SUBJECTIVE AND OBJECTIVE BOX
MANDY THOMPSON 71y Female  MRN#: [id number]     Hospital Day: 1d    Pt is currently admitted with the primary diagnosis of  DECREASED ORAL INTAKE;INTRACTABLE NAUSEA AND VOMITING;WEAKNESS        SUBJECTIVE     Overnight events  Diarrhea and vomiting multiple episodes      Subjective complaints  Pt was complaining of multiple episodes of diarrhea @ night and vomiting everything she would eat.                                              ----------------------------------------------------------  OBJECTIVE  PAST MEDICAL & SURGICAL HISTORY  Pancreatitis, gallstone    Breast cancer  RT    Hypertension    Shingles rash    History of major abdominal surgery    History of seizures as a child    GERD (gastroesophageal reflux disease)     delivery delivered  x2    Abnormal cells in breast  RT breast bumpectomy                                              -----------------------------------------------------------  ALLERGIES:  penicillin (Unknown)                                            ------------------------------------------------------------    HOME MEDICATIONS  Home Medications:  amitriptyline 25 mg oral tablet: 1 tab(s) orally once a day (at bedtime) (07 Dec 2021 20:50)  DULoxetine 60 mg oral delayed release capsule: 1 cap(s) orally once a day (07 Dec 2021 20:50)  Femara 2.5 mg oral tablet: 1 tab(s) orally once a day (07 Dec 2021 20:50)  losartan 100 mg oral tablet: 1 tab(s) orally once a day (07 Dec 2021 20:50)  omeprazole 40 mg oral delayed release capsule: 1 cap(s) orally once a day (07 Dec 2021 20:50)  rosuvastatin 20 mg oral tablet: 1 tab(s) orally once a day (07 Dec 2021 20:50)  Wellbutrin  mg/24 hours oral tablet, extended release: 1 tab(s) orally every 24 hours (07 Dec 2021 20:50)  Xanax 0.25 mg oral tablet: 1 tab(s) orally once a day (at bedtime), As Needed (07 Dec 2021 20:51)                           MEDICATIONS:  STANDING MEDICATIONS  amitriptyline 25 milliGRAM(s) Oral at bedtime  atorvastatin 80 milliGRAM(s) Oral at bedtime  buPROPion XL (24-Hour) . 300 milliGRAM(s) Oral daily  dicyclomine 10 milliGRAM(s) Oral three times a day before meals  DULoxetine 60 milliGRAM(s) Oral daily  fluconAZOLE IVPB 200 milliGRAM(s) IV Intermittent every 24 hours  heparin   Injectable 5000 Unit(s) SubCutaneous every 12 hours  letrozole 2.5 milliGRAM(s) Oral daily  losartan 100 milliGRAM(s) Oral daily  pantoprazole  Injectable 40 milliGRAM(s) IV Push daily  simethicone 80 milliGRAM(s) Chew three times a day  sodium chloride 0.9%. 1000 milliLiter(s) IV Continuous <Continuous>  valACYclovir 1000 milliGRAM(s) Oral every 24 hours    PRN MEDICATIONS  acetaminophen     Tablet .. 650 milliGRAM(s) Oral every 6 hours PRN  ondansetron Injectable 4 milliGRAM(s) IV Push every 6 hours PRN                                            ------------------------------------------------------------  VITAL SIGNS: Last 24 Hours  T(C): 36.2 (15 Dec 2021 05:37), Max: 37.3 (15 Dec 2021 00:55)  T(F): 97.2 (15 Dec 2021 05:37), Max: 99.1 (15 Dec 2021 00:55)  HR: 60 (15 Dec 2021 05:37) (60 - 90)  BP: 142/79 (15 Dec 2021 05:37) (115/79 - 142/79)  BP(mean): --  RR: 18 (15 Dec 2021 00:55) (18 - 20)  SpO2: 96% (15 Dec 2021 00:55) (96% - 99%)                                             --------------------------------------------------------------  LABS:                        14.0   1.58  )-----------( 182      ( 14 Dec 2021 15:16 )             41.9         139  |  102  |  17  ----------------------------<  133<H>  4.4   |  14<L>  |  0.9    Ca    9.6      14 Dec 2021 15:16    TPro  6.7  /  Alb  4.3  /  TBili  0.4  /  DBili  x   /  AST  20  /  ALT  13  /  AlkPhos  128<H>        Urinalysis Basic - ( 14 Dec 2021 15:16 )    Color: Light Yellow / Appearance: Clear / S.048 / pH: x  Gluc: x / Ketone: Negative  / Bili: Negative / Urobili: <2 mg/dL   Blood: x / Protein: Trace / Nitrite: Negative   Leuk Esterase: Negative / RBC: x / WBC x   Sq Epi: x / Non Sq Epi: x / Bacteria: x        Lactate, Blood: 3.8 mmol/L *H* (21 @ 17:12)  Troponin T, Serum: <0.01 ng/mL (21 @ 15:16)          CARDIAC MARKERS ( 14 Dec 2021 15:16 )  x     / <0.01 ng/mL / x     / x     / x                                                  -------------------------------------------------------------  RADIOLOGY:  < from: CT Abdomen and Pelvis w/ IV Cont (21 @ 17:21) >  No evidence of acute abdominal or pelvic mass or inflammatory process    < end of copied text >  < from: CT Head No Cont (21 @ 17:20) >    No acute intracranial hemorrhage or midline shift.    Redemonstration of left middle cranial fossa arachnoid cyst as seen on   prior MRI.    < end of copied text >                                            --------------------------------------------------------------    PHYSICAL EXAM:  GENERAL: NAD, lying in bed comfortably  HEAD:  Atraumatic, Normocephalic  EYES: EOMI, PERRLA, conjunctiva and sclera clear  ENT: Moist mucous membranes  NECK: Supple, No JVD  CHEST/LUNG: Clear to auscultation bilaterally; No rales, rhonchi, wheezing, or rubs. Unlabored respirations  HEART: regular rate and rhythm; No murmurs, rubs, or gallops  ABDOMEN: Bowel sounds present; Soft, Nontender, Nondistended.    EXTREMITIES: + Peripheral Pulses, brisk capillary refill. No clubbing, cyanosis, or edema  NERVOUS SYSTEM:  Alert & Oriented X3, speech clear. No focal deficits   SKIN: No rashes or lesions                                           --------------------------------------------------------------

## 2021-12-16 LAB
ALBUMIN SERPL ELPH-MCNC: 3.7 G/DL — SIGNIFICANT CHANGE UP (ref 3.5–5.2)
ALP SERPL-CCNC: 64 U/L — SIGNIFICANT CHANGE UP (ref 30–115)
ALT FLD-CCNC: 11 U/L — SIGNIFICANT CHANGE UP (ref 0–41)
ANION GAP SERPL CALC-SCNC: 15 MMOL/L — HIGH (ref 7–14)
AST SERPL-CCNC: 22 U/L — SIGNIFICANT CHANGE UP (ref 0–41)
B-OH-BUTYR SERPL-SCNC: <0.2 MMOL/L — SIGNIFICANT CHANGE UP
BASOPHILS # BLD AUTO: 0.03 K/UL — SIGNIFICANT CHANGE UP (ref 0–0.2)
BASOPHILS NFR BLD AUTO: 0.7 % — SIGNIFICANT CHANGE UP (ref 0–1)
BILIRUB SERPL-MCNC: 0.4 MG/DL — SIGNIFICANT CHANGE UP (ref 0.2–1.2)
BUN SERPL-MCNC: 5 MG/DL — LOW (ref 10–20)
CALCIUM SERPL-MCNC: 8.8 MG/DL — SIGNIFICANT CHANGE UP (ref 8.5–10.1)
CHLORIDE SERPL-SCNC: 108 MMOL/L — SIGNIFICANT CHANGE UP (ref 98–110)
CO2 SERPL-SCNC: 17 MMOL/L — SIGNIFICANT CHANGE UP (ref 17–32)
CREAT SERPL-MCNC: 0.6 MG/DL — LOW (ref 0.7–1.5)
CULTURE RESULTS: SIGNIFICANT CHANGE UP
EOSINOPHIL # BLD AUTO: 0.09 K/UL — SIGNIFICANT CHANGE UP (ref 0–0.7)
EOSINOPHIL NFR BLD AUTO: 2.2 % — SIGNIFICANT CHANGE UP (ref 0–8)
GLUCOSE SERPL-MCNC: 122 MG/DL — HIGH (ref 70–99)
HCT VFR BLD CALC: 35 % — LOW (ref 37–47)
HGB BLD-MCNC: 11.5 G/DL — LOW (ref 12–16)
IMM GRANULOCYTES NFR BLD AUTO: 1.5 % — HIGH (ref 0.1–0.3)
LYMPHOCYTES # BLD AUTO: 1.03 K/UL — LOW (ref 1.2–3.4)
LYMPHOCYTES # BLD AUTO: 25.6 % — SIGNIFICANT CHANGE UP (ref 20.5–51.1)
MAGNESIUM SERPL-MCNC: 2 MG/DL — SIGNIFICANT CHANGE UP (ref 1.8–2.4)
MCHC RBC-ENTMCNC: 30.9 PG — SIGNIFICANT CHANGE UP (ref 27–31)
MCHC RBC-ENTMCNC: 32.9 G/DL — SIGNIFICANT CHANGE UP (ref 32–37)
MCV RBC AUTO: 94.1 FL — SIGNIFICANT CHANGE UP (ref 81–99)
MONOCYTES # BLD AUTO: 0.42 K/UL — SIGNIFICANT CHANGE UP (ref 0.1–0.6)
MONOCYTES NFR BLD AUTO: 10.4 % — HIGH (ref 1.7–9.3)
NEUTROPHILS # BLD AUTO: 2.4 K/UL — SIGNIFICANT CHANGE UP (ref 1.4–6.5)
NEUTROPHILS NFR BLD AUTO: 59.6 % — SIGNIFICANT CHANGE UP (ref 42.2–75.2)
NRBC # BLD: 0 /100 WBCS — SIGNIFICANT CHANGE UP (ref 0–0)
PLATELET # BLD AUTO: 191 K/UL — SIGNIFICANT CHANGE UP (ref 130–400)
POTASSIUM SERPL-MCNC: 4.5 MMOL/L — SIGNIFICANT CHANGE UP (ref 3.5–5)
POTASSIUM SERPL-SCNC: 4.5 MMOL/L — SIGNIFICANT CHANGE UP (ref 3.5–5)
PROT SERPL-MCNC: 5.9 G/DL — LOW (ref 6–8)
RBC # BLD: 3.72 M/UL — LOW (ref 4.2–5.4)
RBC # FLD: 14.2 % — SIGNIFICANT CHANGE UP (ref 11.5–14.5)
SODIUM SERPL-SCNC: 140 MMOL/L — SIGNIFICANT CHANGE UP (ref 135–146)
SPECIMEN SOURCE: SIGNIFICANT CHANGE UP
WBC # BLD: 4.03 K/UL — LOW (ref 4.8–10.8)
WBC # FLD AUTO: 4.03 K/UL — LOW (ref 4.8–10.8)

## 2021-12-16 PROCEDURE — 99223 1ST HOSP IP/OBS HIGH 75: CPT

## 2021-12-16 PROCEDURE — 99233 SBSQ HOSP IP/OBS HIGH 50: CPT

## 2021-12-16 RX ORDER — ALPRAZOLAM 0.25 MG
0.5 TABLET ORAL AT BEDTIME
Refills: 0 | Status: DISCONTINUED | OUTPATIENT
Start: 2021-12-16 | End: 2021-12-20

## 2021-12-16 RX ORDER — SODIUM BICARBONATE 1 MEQ/ML
325 SYRINGE (ML) INTRAVENOUS THREE TIMES A DAY
Refills: 0 | Status: DISCONTINUED | OUTPATIENT
Start: 2021-12-16 | End: 2021-12-20

## 2021-12-16 RX ORDER — FLUCONAZOLE 150 MG/1
400 TABLET ORAL DAILY
Refills: 0 | Status: DISCONTINUED | OUTPATIENT
Start: 2021-12-17 | End: 2021-12-20

## 2021-12-16 RX ADMIN — LETROZOLE 2.5 MILLIGRAM(S): 2.5 TABLET, FILM COATED ORAL at 13:28

## 2021-12-16 RX ADMIN — Medication 10 MILLIGRAM(S): at 06:17

## 2021-12-16 RX ADMIN — Medication 325 MILLIGRAM(S): at 18:54

## 2021-12-16 RX ADMIN — HEPARIN SODIUM 5000 UNIT(S): 5000 INJECTION INTRAVENOUS; SUBCUTANEOUS at 05:55

## 2021-12-16 RX ADMIN — HEPARIN SODIUM 5000 UNIT(S): 5000 INJECTION INTRAVENOUS; SUBCUTANEOUS at 18:23

## 2021-12-16 RX ADMIN — Medication 25 MILLIGRAM(S): at 21:50

## 2021-12-16 RX ADMIN — SIMETHICONE 80 MILLIGRAM(S): 80 TABLET, CHEWABLE ORAL at 16:05

## 2021-12-16 RX ADMIN — PANTOPRAZOLE SODIUM 40 MILLIGRAM(S): 20 TABLET, DELAYED RELEASE ORAL at 13:09

## 2021-12-16 RX ADMIN — BUPROPION HYDROCHLORIDE 300 MILLIGRAM(S): 150 TABLET, EXTENDED RELEASE ORAL at 13:09

## 2021-12-16 RX ADMIN — Medication 325 MILLIGRAM(S): at 21:50

## 2021-12-16 RX ADMIN — SIMETHICONE 80 MILLIGRAM(S): 80 TABLET, CHEWABLE ORAL at 05:55

## 2021-12-16 RX ADMIN — CEFEPIME 100 MILLIGRAM(S): 1 INJECTION, POWDER, FOR SOLUTION INTRAMUSCULAR; INTRAVENOUS at 06:23

## 2021-12-16 RX ADMIN — LOSARTAN POTASSIUM 100 MILLIGRAM(S): 100 TABLET, FILM COATED ORAL at 05:55

## 2021-12-16 RX ADMIN — Medication 0.5 MILLIGRAM(S): at 21:57

## 2021-12-16 RX ADMIN — Medication 10 MILLIGRAM(S): at 13:09

## 2021-12-16 RX ADMIN — SODIUM CHLORIDE 75 MILLILITER(S): 9 INJECTION INTRAMUSCULAR; INTRAVENOUS; SUBCUTANEOUS at 22:29

## 2021-12-16 RX ADMIN — DULOXETINE HYDROCHLORIDE 60 MILLIGRAM(S): 30 CAPSULE, DELAYED RELEASE ORAL at 13:09

## 2021-12-16 RX ADMIN — ATORVASTATIN CALCIUM 80 MILLIGRAM(S): 80 TABLET, FILM COATED ORAL at 21:50

## 2021-12-16 RX ADMIN — SIMETHICONE 80 MILLIGRAM(S): 80 TABLET, CHEWABLE ORAL at 21:51

## 2021-12-16 RX ADMIN — Medication 10 MILLIGRAM(S): at 18:23

## 2021-12-16 NOTE — CONSULT NOTE ADULT - SUBJECTIVE AND OBJECTIVE BOX
MANDY THOMPSON  71y, Female  Allergy: penicillin (Unknown)    CHIEF COMPLAINT: N/V (16 Dec 2021 09:16)    HPI:  HPI:  Patient is a 71 year old female with PMH of IBS, pancreatitis, Breast Ca s/p radiation on Letrozole presents for persistent nausea and vomiting. HPI starts on  when patient was sent to the hospital by  for  severe александр esophagitis. Patient's symptoms started after she returned from [place] on . She had many bouts of n/v with the absence of BMs. Patient came to the ED  for abdominal distension and inability to have a BM, CT AP did not show evidence of bowel obstruction or pneumoperitoneum at that time. Patient had recent EGD with Dr. Babin on 12/3/21 which showed esophageal candidiasis and was started on PO diflucan. Patient was not responding to PO antifungals so she was sent in for further work up on . 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 Patients nausea was treated with zofran, and she was discharged with rx of PO zofran. During this admission patient was also found to be neutropenic, she was evaluated by hem onc and given Neupogen. Since discharge on 21, patient has had worsening nausea and vomiting unable to tolerated PO.    In the ED, /77, HR 88, RR 20, SpO2 99% on RA, afebrile. CT Abdomen and Pelvis with IV contrast showed no acute intra-abdominal pathology.  Labs showed leukopenia and lactate3.8. Patient received 2L of IVF and anti-emetics in ED. Admitted to medicine for further management of candida esophagitis.  (14 Dec 2021 20:12)      Infectious Diseases History:  Old Micro Data/Cultures:     FAMILY HISTORY:    PAST MEDICAL & SURGICAL HISTORY:  Pancreatitis, gallstone    Breast cancer  RT    Hypertension    Shingles rash    History of major abdominal surgery    History of seizures as a child    GERD (gastroesophageal reflux disease)     delivery delivered  x2    Abnormal cells in breast  RT breast bumpectomy        SOCIAL HISTORY  Social History:  Denies smoking and drug use.  occasional alcohol use (07 Dec 2021 18:53)      Recent Travel:  Other Exposures:     ROS  General: Denies rigors, nightsweats  HEENT: Denies headache, rhinorrhea, sore throat, eye pain  CV: Denies CP, palpitations  PULM: Denies wheezing, hemoptysis  GI: Denies hematemesis, hematochezia, melena  : Denies discharge, hematuria  MSK: Denies arthralgias, myalgias  SKIN: Denies rash, lesions  NEURO: Denies paresthesias, weakness  PSYCH: Denies depression, anxiety    VITALS:  T(F): 96, Max: 97.5 (12-15-21 @ 19:58)  HR: 56  BP: 140/66  RR: 18Vital Signs Last 24 Hrs  T(C): 35.6 (16 Dec 2021 04:51), Max: 36.4 (15 Dec 2021 19:58)  T(F): 96 (16 Dec 2021 04:51), Max: 97.5 (15 Dec 2021 19:58)  HR: 56 (16 Dec 2021 04:51) (52 - 57)  BP: 140/66 (16 Dec 2021 04:51) (139/78 - 178/101)  BP(mean): --  RR: 18 (16 Dec 2021 04:51) (18 - 18)  SpO2: 95% (16 Dec 2021 04:51) (93% - 95%)    PHYSICAL EXAM:  Gen: NAD, resting in bed  HEENT: Normocephalic, atraumatic  Neck: supple, no lymphadenopathy  CV: Regular rate & regular rhythm  Lungs: CTAB  Abdomen: Soft, BS present  Ext: Warm, well perfused  Neuro: non focal, awake  Skin: no rash, no lesions  Lines: no phlebitis    TESTS & MEASUREMENTS:                        11.7   4.47  )-----------( 177      ( 15 Dec 2021 11:00 )             36.1     12-15    137  |  108  |  13  ----------------------------<  88  4.9   |  14<L>  |  0.9    Ca    8.5      15 Dec 2021 11:00  Phos  2.7     12-15  Mg     1.6     -15    TPro  6.1  /  Alb  3.5  /  TBili  0.5  /  DBili  x   /  AST  33  /  ALT  11  /  AlkPhos  70  -15    eGFR if Non African American: 65 mL/min/1.73M2 (12-15-21 @ 11:00)  eGFR if : 75 mL/min/1.73M2 (12-15-21 @ 11:00)    LIVER FUNCTIONS - ( 15 Dec 2021 11:00 )  Alb: 3.5 g/dL / Pro: 6.1 g/dL / ALK PHOS: 70 U/L / ALT: 11 U/L / AST: 33 U/L / GGT: x           Urinalysis Basic - ( 14 Dec 2021 15:16 )    Color: Light Yellow / Appearance: Clear / S.048 / pH: x  Gluc: x / Ketone: Negative  / Bili: Negative / Urobili: <2 mg/dL   Blood: x / Protein: Trace / Nitrite: Negative   Leuk Esterase: Negative / RBC: x / WBC x   Sq Epi: x / Non Sq Epi: x / Bacteria: x        GI PCR Panel, Stool (collected 12-15-21 @ 17:54)  Source: .Stool Feces  Final Report (21 @ 04:53):    GI PCR Results: NOT detected    *******Please Note:*******    GI panel PCR evaluates for:    Campylobacter, Plesiomonas shigelloides, Salmonella,    Vibrio, Yersinia enterocolitica, Enteroaggregative    Escherichia coli (EAEC), Enteropathogenic E.coli (EPEC),    Enterotoxigenic E. coli (ETEC) lt/st, Shiga-like    toxin-producing E. coli (STEC) stx1/stx2,    Shigella/ Enteroinvasive E. coli (EIEC), Cryptosporidium,    Cyclospora cayetanensis, Entamoeba histolytica,    Giardia lamblia, Adenovirus F 40/41, Astrovirus,    Norovirus GI/GII, Rotavirus A, Sapovirus    Culture - Acid Fast - Stool w/Smear (collected 21 @ 17:20)  Source: .Stool Stool  Preliminary Report (21 @ 15:05):    Culture is being performed.    Culture - Stool (collected 21 @ 17:20)  Source: .Stool Feces  Final Report (21 @ 13:58):    No enteric pathogens isolated.    (Stool culture examined for Salmonella,    Shigella, Campylobacter, Aeromonas, Plesiomonas,    Vibrio, E.coli O157 and Yersinia)    Culture - Urine (collected 21 @ 12:00)  Source: Clean Catch Clean Catch (Midstream)  Final Report (12-10-21 @ 17:26):    No growth        Lactate, Blood: 0.8 mmol/L (12-15-21 @ 11:00)  Blood Gas Venous - Lactate: 3.50 mmol/L (21 @ 20:54)  Lactate, Blood: 3.8 mmol/L (21 @ 17:12)      INFECTIOUS DISEASES TESTING  MRSA PCR Result.: Negative (21 @ 12:00)  HIV-1/2 Combo Result: Nonreact (21 @ 19:26)      RADIOLOGY & ADDITIONAL TESTS:  I have personally reviewed the last available Chest xray  CXR      CT  CT Abdomen and Pelvis w/ IV Cont:   ACC: [id number] EXAM:  CT ABDOMEN AND PELVIS IC                          PROCEDURE DATE:  2021          INTERPRETATION:  REASON FOR EXAM / CLINICAL STATEMENT:  Nausea vomiting   diarrhea. WBC 1.58    PMHx of Rt breast cancer. HTN    TECHNIQUE:Contiguous axial CT images were obtained from the lower chest   to the pubic symphysis with IV contrast.  Reformatted images in the   coronal and sagittal planes were acquired.      COMPARISON CT: CT scan of the chest dated 12/10/2021, and CT of the   abdomen and pelvis dated 2021    OTHER STUDIES USED FOR CORRELATION: None.    FINDINGS:    TUBES AND LINES: None.    LOWER CHEST: There are mild dependent atelectatic changes at the lung   bases. No pleural or pericardial effusion.    HEPATIC: The liver is normal in size with no evidence of solid mass or   bile duct dilatation. Mild hepatic steatosis is noted. The portal vein is   patent. The hepatic veins are opacified.    BILIARY: No calcified gallstones are noted.    SPLEEN: Unremarkable.    PANCREAS: The pancreas is normal in size and configuration. No evidence   of mass or pancreatitis.    ADRENAL GLANDS: Unremarkable.    KIDNEYS: There is symmetric renal enhancement. No evidence of   hydronephrosis, calcified stones, or solid mass.    ABDOMINOPELVIC NODES: Unremarkable.    PELVIC ORGANS: No evidence of pelvic mass, lymphadenopathy, or fluid   collection.    BLADDER: Unremarkable.    PERITONEUM/MESENTERY/BOWEL: Sigmoid diverticula with no evidence of   diverticulitis.    No evidence of bowel obstruction, colitis, inflammatory process, or   ascites. No pneumoperitoneum.  The appendix is normal in appearance.    BONES/SOFT TISSUES: Degenerative changes of the spine are noted. Stable   deformity of the inferior endplate of T11. Mild anterolisthesis at L4-5.   Surgical clips noted in the anterior abdominal wall.    OTHER: Aortoiliac calcifications are noted with no evidence of abdominal   aortic aneurysm.      IMPRESSION:    No evidence of acute abdominal or pelvic mass or inflammatory process    --- End of Report ---            CHUCK VASQUEZ MD; Attending Interventional Radiologist  This document has been electronically signed. Dec 14 2021  7:19PM (21 @ 17:21)      CARDIOLOGY TESTING  12 Lead ECG:   Ventricular Rate 52 BPM    Atrial Rate 52 BPM    P-R Interval 152 ms    QRS Duration 142 ms    Q-T Interval 484 ms    QTC Calculation(Bazett) 450 ms    P Axis 48 degrees    R Axis 20 degrees    T Axis 38 degrees    Diagnosis Line Sinus bradycardia  Right bundle branch block  Abnormal ECG    Confirmed by Neil Watts ([id number]) on 2021 3:29:18 PM (21 @ 09:12)      All available historical records have been reviewed    MEDICATIONS  amitriptyline 25  atorvastatin 80  buPROPion XL (24-Hour) . 300  cefepime   IVPB   cefepime   IVPB 1000  dicyclomine 10  DULoxetine 60  heparin   Injectable 5000  letrozole 2.5  losartan 100  pantoprazole  Injectable 40  simethicone 80  sodium chloride 0.9%. 1000  valACYclovir 1000      ANTIBIOTICS:  cefepime   IVPB      cefepime   IVPB 1000 milliGRAM(s) IV Intermittent every 8 hours  valACYclovir 1000 milliGRAM(s) Oral every 24 hours      All available historical data has been reviewed    ASSESSMENT  71yFemale with a PMH of....... (fill in)    IMPRESSION  # (?) Sepsis on admission (2 or more of the following T<96.8F, T>101F, Pulse>90, Resp Rate>20, WBC>12, wbc<4, Bands>10%), PLUS (+) lactic acidosis, OR metabolic encephalopathy, OR LUCERO, OR bacteremia . Otherwise just SIRS on admission, sepsis ruled out  #  #    RECOMMENDATIONS  - f/u pending cultures  - ***    This is a pended note. All final recommendations to follow pending discussion with ID Attending    MANDY THOMPSON  71y, Female  Allergy: penicillin (Unknown)    CHIEF COMPLAINT: N/V (16 Dec 2021 09:16)    HPI:  HPI:  Patient is a 71 year old female with PMH of IBS, pancreatitis, Breast Ca s/p radiation on Letrozole presents for persistent nausea and vomiting. HPI starts on  when patient was sent to the hospital by  for  severe александр esophagitis. Patient's symptoms started after she returned from [place] on . She had many bouts of n/v with the absence of BMs. Patient came to the ED  for abdominal distension and inability to have a BM, CT AP did not show evidence of bowel obstruction or pneumoperitoneum at that time. Patient had recent EGD with Dr. Babin on 12/3/21 which showed esophageal candidiasis and was started on PO diflucan. Patient was not responding to PO antifungals so she was sent in for further work up on . 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 Patients nausea was treated with zofran, and she was discharged with rx of PO zofran. During this admission patient was also found to be neutropenic, she was evaluated by hem onc and given Neupogen. Since discharge on 21, patient has had worsening nausea and vomiting unable to tolerated PO.    In the ED, /77, HR 88, RR 20, SpO2 99% on RA, afebrile. CT Abdomen and Pelvis with IV contrast showed no acute intra-abdominal pathology.  Labs showed leukopenia and lactate3.8. Patient received 2L of IVF and anti-emetics in ED. Admitted to medicine for further management of candida esophagitis.  (14 Dec 2021 20:12)      Infectious Diseases History:  Old Micro Data/Cultures:     FAMILY HISTORY:    PAST MEDICAL & SURGICAL HISTORY:  Pancreatitis, gallstone    Breast cancer  RT    Hypertension    Shingles rash    History of major abdominal surgery    History of seizures as a child    GERD (gastroesophageal reflux disease)     delivery delivered  x2    Abnormal cells in breast  RT breast bumpectomy        SOCIAL HISTORY  Social History:  Denies smoking and drug use.  occasional alcohol use (07 Dec 2021 18:53)      Recent Travel:  Other Exposures:     ROS  General: Denies rigors, nightsweats  HEENT: Denies headache, rhinorrhea, sore throat, eye pain  CV: Denies CP, palpitations  PULM: Denies wheezing, hemoptysis  GI: Denies hematemesis, hematochezia, melena  : Denies discharge, hematuria  MSK: Denies arthralgias, myalgias  SKIN: Denies rash, lesions  NEURO: Denies paresthesias, weakness  PSYCH: Denies depression, anxiety    VITALS:  T(F): 96, Max: 97.5 (12-15-21 @ 19:58)  HR: 56  BP: 140/66  RR: 18Vital Signs Last 24 Hrs  T(C): 35.6 (16 Dec 2021 04:51), Max: 36.4 (15 Dec 2021 19:58)  T(F): 96 (16 Dec 2021 04:51), Max: 97.5 (15 Dec 2021 19:58)  HR: 56 (16 Dec 2021 04:51) (52 - 57)  BP: 140/66 (16 Dec 2021 04:51) (139/78 - 178/101)  BP(mean): --  RR: 18 (16 Dec 2021 04:51) (18 - 18)  SpO2: 95% (16 Dec 2021 04:51) (93% - 95%)    PHYSICAL EXAM:  Gen: NAD, resting in bed  HEENT: Normocephalic, atraumatic  Neck: supple, no lymphadenopathy  CV: Regular rate & regular rhythm  Lungs: CTAB  Abdomen: distended, soft, BS present  Ext: Warm, well perfused  Neuro: non focal, awake  Skin: no rash, no lesions    TESTS & MEASUREMENTS:                        11.7   4.47  )-----------( 177      ( 15 Dec 2021 11:00 )             36.1     12-15    137  |  108  |  13  ----------------------------<  88  4.9   |  14<L>  |  0.9    Ca    8.5      15 Dec 2021 11:00  Phos  2.7     -15  Mg     1.6     12-15    TPro  6.1  /  Alb  3.5  /  TBili  0.5  /  DBili  x   /  AST  33  /  ALT  11  /  AlkPhos  70  12-15    eGFR if Non African American: 65 mL/min/1.73M2 (12-15-21 @ 11:00)  eGFR if : 75 mL/min/1.73M2 (12-15-21 @ 11:00)    LIVER FUNCTIONS - ( 15 Dec 2021 11:00 )  Alb: 3.5 g/dL / Pro: 6.1 g/dL / ALK PHOS: 70 U/L / ALT: 11 U/L / AST: 33 U/L / GGT: x           Urinalysis Basic - ( 14 Dec 2021 15:16 )    Color: Light Yellow / Appearance: Clear / S.048 / pH: x  Gluc: x / Ketone: Negative  / Bili: Negative / Urobili: <2 mg/dL   Blood: x / Protein: Trace / Nitrite: Negative   Leuk Esterase: Negative / RBC: x / WBC x   Sq Epi: x / Non Sq Epi: x / Bacteria: x        GI PCR Panel, Stool (collected 12-15-21 @ 17:54)  Source: .Stool Feces  Final Report (21 @ 04:53):    GI PCR Results: NOT detected    *******Please Note:*******    GI panel PCR evaluates for:    Campylobacter, Plesiomonas shigelloides, Salmonella,    Vibrio, Yersinia enterocolitica, Enteroaggregative    Escherichia coli (EAEC), Enteropathogenic E.coli (EPEC),    Enterotoxigenic E. coli (ETEC) lt/st, Shiga-like    toxin-producing E. coli (STEC) stx1/stx2,    Shigella/ Enteroinvasive E. coli (EIEC), Cryptosporidium,    Cyclospora cayetanensis, Entamoeba histolytica,    Giardia lamblia, Adenovirus F 40/41, Astrovirus,    Norovirus GI/GII, Rotavirus A, Sapovirus    Culture - Acid Fast - Stool w/Smear (collected 21 @ 17:20)  Source: .Stool Stool  Preliminary Report (21 @ 15:05):    Culture is being performed.    Culture - Stool (collected 21 @ 17:20)  Source: .Stool Feces  Final Report (21 @ 13:58):    No enteric pathogens isolated.    (Stool culture examined for Salmonella,    Shigella, Campylobacter, Aeromonas, Plesiomonas,    Vibrio, E.coli O157 and Yersinia)    Culture - Urine (collected 21 @ 12:00)  Source: Clean Catch Clean Catch (Midstream)  Final Report (12-10-21 @ 17:26):    No growth        Lactate, Blood: 0.8 mmol/L (12-15-21 @ 11:00)  Blood Gas Venous - Lactate: 3.50 mmol/L (21 @ 20:54)  Lactate, Blood: 3.8 mmol/L (21 @ 17:12)      INFECTIOUS DISEASES TESTING  MRSA PCR Result.: Negative (21 @ 12:00)  HIV-1/2 Combo Result: Nonreact (21 @ 19:26)      RADIOLOGY & ADDITIONAL TESTS:    CT  CT Abdomen and Pelvis w/ IV Cont:   ACC: [id number] EXAM:  CT ABDOMEN AND PELVIS IC                          PROCEDURE DATE:  2021          INTERPRETATION:  REASON FOR EXAM / CLINICAL STATEMENT:  Nausea vomiting   diarrhea. WBC 1.58    PMHx of Rt breast cancer. HTN    TECHNIQUE:Contiguous axial CT images were obtained from the lower chest   to the pubic symphysis with IV contrast.  Reformatted images in the   coronal and sagittal planes were acquired.      COMPARISON CT: CT scan of the chest dated 12/10/2021, and CT of the   abdomen and pelvis dated 2021    OTHER STUDIES USED FOR CORRELATION: None.    FINDINGS:    TUBES AND LINES: None.    LOWER CHEST: There are mild dependent atelectatic changes at the lung   bases. No pleural or pericardial effusion.    HEPATIC: The liver is normal in size with no evidence of solid mass or   bile duct dilatation. Mild hepatic steatosis is noted. The portal vein is   patent. The hepatic veins are opacified.    BILIARY: No calcified gallstones are noted.    SPLEEN: Unremarkable.    PANCREAS: The pancreas is normal in size and configuration. No evidence   of mass or pancreatitis.    ADRENAL GLANDS: Unremarkable.    KIDNEYS: There is symmetric renal enhancement. No evidence of   hydronephrosis, calcified stones, or solid mass.    ABDOMINOPELVIC NODES: Unremarkable.    PELVIC ORGANS: No evidence of pelvic mass, lymphadenopathy, or fluid   collection.    BLADDER: Unremarkable.    PERITONEUM/MESENTERY/BOWEL: Sigmoid diverticula with no evidence of   diverticulitis.    No evidence of bowel obstruction, colitis, inflammatory process, or   ascites. No pneumoperitoneum.  The appendix is normal in appearance.    BONES/SOFT TISSUES: Degenerative changes of the spine are noted. Stable   deformity of the inferior endplate of T11. Mild anterolisthesis at L4-5.   Surgical clips noted in the anterior abdominal wall.    OTHER: Aortoiliac calcifications are noted with no evidence of abdominal   aortic aneurysm.      IMPRESSION:    No evidence of acute abdominal or pelvic mass or inflammatory process    --- End of Report ---      CHUCK VASQUEZ MD; Attending Interventional Radiologist  This document has been electronically signed. Dec 14 2021  7:19PM (21 @ 17:21)      CARDIOLOGY TESTING  12 Lead ECG:   Ventricular Rate 52 BPM    Atrial Rate 52 BPM    P-R Interval 152 ms    QRS Duration 142 ms    Q-T Interval 484 ms    QTC Calculation(Bazett) 450 ms    P Axis 48 degrees    R Axis 20 degrees    T Axis 38 degrees    Diagnosis Line Sinus bradycardia  Right bundle branch block  Abnormal ECG    Confirmed by Neil Watts ([id number]) on 2021 3:29:18 PM (21 @ 09:12)      All available historical records have been reviewed    MEDICATIONS  amitriptyline 25  atorvastatin 80  buPROPion XL (24-Hour) . 300  cefepime   IVPB   cefepime   IVPB 1000  dicyclomine 10  DULoxetine 60  heparin   Injectable 5000  letrozole 2.5  losartan 100  pantoprazole  Injectable 40  simethicone 80  sodium chloride 0.9%. 1000  valACYclovir 1000      ANTIBIOTICS:  cefepime   IVPB      cefepime   IVPB 1000 milliGRAM(s) IV Intermittent every 8 hours  valACYclovir 1000 milliGRAM(s) Oral every 24 hours      All available historical data has been reviewed    ASSESSMENT  71yFemale with a PMH of breast cancer on letrozole and severe candida esophagitis presents with persistent nausea, vomiting, and diarrhea.     IMPRESSION    #diarrhea  -patient still having diarrhea ~4 times/day  -GI PCR and C dif negative    #candida esophagitis  -s/p EGD and biopsy on 12/3 which showed esophageal candidiasis, non-erosive gastritis, and normal duodenal mucosa  -patient was started on diflucan 200mg PO daily; per GI, 21 day course starting from 12/3  -reports clinical improvement within 4 days of starting antifungal and her odynophagia has resolved    #recurrent HSV  - currently asymptomatic; patient says she get lesions in buttock region    RECOMMENDATIONS  -PO dilflucan 400mg PO qd; no need for IV antifungal as patient has shown clinical improvement  -d/c cefepime  -no need for HSV prophylaxis with valtrex    This is a pended note. All final recommendations to follow pending discussion with ID Attending    MANDY THOMPSON  71y, Female  Allergy: penicillin (Unknown)    CHIEF COMPLAINT: N/V (16 Dec 2021 09:16)    HPI:  HPI:  Patient is a 71 year old female with PMH of IBS, pancreatitis, Breast Ca s/p radiation on Letrozole presents for persistent nausea and vomiting. HPI starts on  when patient was sent to the hospital by  for  severe александр esophagitis. Patient's symptoms started after she returned from [place] on . She had many bouts of n/v with the absence of BMs. Patient came to the ED  for abdominal distension and inability to have a BM, CT AP did not show evidence of bowel obstruction or pneumoperitoneum at that time. Patient had recent EGD with Dr. Babin on 12/3/21 which showed esophageal candidiasis and was started on PO diflucan. Patient was not responding to PO antifungals so she was sent in for further work up on . 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 Patients nausea was treated with zofran, and she was discharged with rx of PO zofran. During this admission patient was also found to be neutropenic, she was evaluated by hem onc and given Neupogen. Since discharge on 21, patient has had worsening nausea and vomiting unable to tolerated PO.    In the ED, /77, HR 88, RR 20, SpO2 99% on RA, afebrile. CT Abdomen and Pelvis with IV contrast showed no acute intra-abdominal pathology.  Labs showed leukopenia and lactate3.8. Patient received 2L of IVF and anti-emetics in ED. Admitted to medicine for further management of candida esophagitis.  (14 Dec 2021 20:12)      Infectious Diseases History:  Old Micro Data/Cultures:     FAMILY HISTORY:    PAST MEDICAL & SURGICAL HISTORY:  Pancreatitis, gallstone    Breast cancer  RT    Hypertension    Shingles rash    History of major abdominal surgery    History of seizures as a child    GERD (gastroesophageal reflux disease)     delivery delivered  x2    Abnormal cells in breast  RT breast bumpectomy        SOCIAL HISTORY  Social History:  Denies smoking and drug use.  occasional alcohol use (07 Dec 2021 18:53)      Recent Travel:  Other Exposures:     ROS  General: Denies rigors, nightsweats  HEENT: Denies headache, rhinorrhea, sore throat, eye pain  CV: Denies CP, palpitations  PULM: Denies wheezing, hemoptysis  GI: Denies hematemesis, hematochezia, melena  : Denies discharge, hematuria  MSK: Denies arthralgias, myalgias  SKIN: Denies rash, lesions  NEURO: Denies paresthesias, weakness  PSYCH: Denies depression, anxiety    VITALS:  T(F): 96, Max: 97.5 (12-15-21 @ 19:58)  HR: 56  BP: 140/66  RR: 18Vital Signs Last 24 Hrs  T(C): 35.6 (16 Dec 2021 04:51), Max: 36.4 (15 Dec 2021 19:58)  T(F): 96 (16 Dec 2021 04:51), Max: 97.5 (15 Dec 2021 19:58)  HR: 56 (16 Dec 2021 04:51) (52 - 57)  BP: 140/66 (16 Dec 2021 04:51) (139/78 - 178/101)  BP(mean): --  RR: 18 (16 Dec 2021 04:51) (18 - 18)  SpO2: 95% (16 Dec 2021 04:51) (93% - 95%)    PHYSICAL EXAM:  Gen: NAD, resting in bed  HEENT: Normocephalic, atraumatic  Neck: supple, no lymphadenopathy  CV: Regular rate & regular rhythm  Lungs: CTAB  Abdomen: distended, soft, BS present  Ext: Warm, well perfused  Neuro: non focal, awake  Skin: no rash, no lesions    TESTS & MEASUREMENTS:                        11.7   4.47  )-----------( 177      ( 15 Dec 2021 11:00 )             36.1     12-15    137  |  108  |  13  ----------------------------<  88  4.9   |  14<L>  |  0.9    Ca    8.5      15 Dec 2021 11:00  Phos  2.7     -15  Mg     1.6     12-15    TPro  6.1  /  Alb  3.5  /  TBili  0.5  /  DBili  x   /  AST  33  /  ALT  11  /  AlkPhos  70  12-15    eGFR if Non African American: 65 mL/min/1.73M2 (12-15-21 @ 11:00)  eGFR if : 75 mL/min/1.73M2 (12-15-21 @ 11:00)    LIVER FUNCTIONS - ( 15 Dec 2021 11:00 )  Alb: 3.5 g/dL / Pro: 6.1 g/dL / ALK PHOS: 70 U/L / ALT: 11 U/L / AST: 33 U/L / GGT: x           Urinalysis Basic - ( 14 Dec 2021 15:16 )    Color: Light Yellow / Appearance: Clear / S.048 / pH: x  Gluc: x / Ketone: Negative  / Bili: Negative / Urobili: <2 mg/dL   Blood: x / Protein: Trace / Nitrite: Negative   Leuk Esterase: Negative / RBC: x / WBC x   Sq Epi: x / Non Sq Epi: x / Bacteria: x        GI PCR Panel, Stool (collected 12-15-21 @ 17:54)  Source: .Stool Feces  Final Report (21 @ 04:53):    GI PCR Results: NOT detected    *******Please Note:*******    GI panel PCR evaluates for:    Campylobacter, Plesiomonas shigelloides, Salmonella,    Vibrio, Yersinia enterocolitica, Enteroaggregative    Escherichia coli (EAEC), Enteropathogenic E.coli (EPEC),    Enterotoxigenic E. coli (ETEC) lt/st, Shiga-like    toxin-producing E. coli (STEC) stx1/stx2,    Shigella/ Enteroinvasive E. coli (EIEC), Cryptosporidium,    Cyclospora cayetanensis, Entamoeba histolytica,    Giardia lamblia, Adenovirus F 40/41, Astrovirus,    Norovirus GI/GII, Rotavirus A, Sapovirus    Culture - Acid Fast - Stool w/Smear (collected 21 @ 17:20)  Source: .Stool Stool  Preliminary Report (21 @ 15:05):    Culture is being performed.    Culture - Stool (collected 21 @ 17:20)  Source: .Stool Feces  Final Report (21 @ 13:58):    No enteric pathogens isolated.    (Stool culture examined for Salmonella,    Shigella, Campylobacter, Aeromonas, Plesiomonas,    Vibrio, E.coli O157 and Yersinia)    Culture - Urine (collected 21 @ 12:00)  Source: Clean Catch Clean Catch (Midstream)  Final Report (12-10-21 @ 17:26):    No growth        Lactate, Blood: 0.8 mmol/L (12-15-21 @ 11:00)  Blood Gas Venous - Lactate: 3.50 mmol/L (21 @ 20:54)  Lactate, Blood: 3.8 mmol/L (21 @ 17:12)      INFECTIOUS DISEASES TESTING  MRSA PCR Result.: Negative (21 @ 12:00)  HIV-1/2 Combo Result: Nonreact (21 @ 19:26)      RADIOLOGY & ADDITIONAL TESTS:    CT  CT Abdomen and Pelvis w/ IV Cont:   ACC: [id number] EXAM:  CT ABDOMEN AND PELVIS IC                          PROCEDURE DATE:  2021          INTERPRETATION:  REASON FOR EXAM / CLINICAL STATEMENT:  Nausea vomiting   diarrhea. WBC 1.58    PMHx of Rt breast cancer. HTN    TECHNIQUE:Contiguous axial CT images were obtained from the lower chest   to the pubic symphysis with IV contrast.  Reformatted images in the   coronal and sagittal planes were acquired.      COMPARISON CT: CT scan of the chest dated 12/10/2021, and CT of the   abdomen and pelvis dated 2021    OTHER STUDIES USED FOR CORRELATION: None.    FINDINGS:    TUBES AND LINES: None.    LOWER CHEST: There are mild dependent atelectatic changes at the lung   bases. No pleural or pericardial effusion.    HEPATIC: The liver is normal in size with no evidence of solid mass or   bile duct dilatation. Mild hepatic steatosis is noted. The portal vein is   patent. The hepatic veins are opacified.    BILIARY: No calcified gallstones are noted.    SPLEEN: Unremarkable.    PANCREAS: The pancreas is normal in size and configuration. No evidence   of mass or pancreatitis.    ADRENAL GLANDS: Unremarkable.    KIDNEYS: There is symmetric renal enhancement. No evidence of   hydronephrosis, calcified stones, or solid mass.    ABDOMINOPELVIC NODES: Unremarkable.    PELVIC ORGANS: No evidence of pelvic mass, lymphadenopathy, or fluid   collection.    BLADDER: Unremarkable.    PERITONEUM/MESENTERY/BOWEL: Sigmoid diverticula with no evidence of   diverticulitis.    No evidence of bowel obstruction, colitis, inflammatory process, or   ascites. No pneumoperitoneum.  The appendix is normal in appearance.    BONES/SOFT TISSUES: Degenerative changes of the spine are noted. Stable   deformity of the inferior endplate of T11. Mild anterolisthesis at L4-5.   Surgical clips noted in the anterior abdominal wall.    OTHER: Aortoiliac calcifications are noted with no evidence of abdominal   aortic aneurysm.      IMPRESSION:    No evidence of acute abdominal or pelvic mass or inflammatory process    --- End of Report ---      CHUCK VASQUEZ MD; Attending Interventional Radiologist  This document has been electronically signed. Dec 14 2021  7:19PM (21 @ 17:21)      CARDIOLOGY TESTING  12 Lead ECG:   Ventricular Rate 52 BPM    Atrial Rate 52 BPM    P-R Interval 152 ms    QRS Duration 142 ms    Q-T Interval 484 ms    QTC Calculation(Bazett) 450 ms    P Axis 48 degrees    R Axis 20 degrees    T Axis 38 degrees    Diagnosis Line Sinus bradycardia  Right bundle branch block  Abnormal ECG    Confirmed by Neil Watts ([id number]) on 2021 3:29:18 PM (21 @ 09:12)      All available historical records have been reviewed    MEDICATIONS  amitriptyline 25  atorvastatin 80  buPROPion XL (24-Hour) . 300  cefepime   IVPB   cefepime   IVPB 1000  dicyclomine 10  DULoxetine 60  heparin   Injectable 5000  letrozole 2.5  losartan 100  pantoprazole  Injectable 40  simethicone 80  sodium chloride 0.9%. 1000  valACYclovir 1000      ANTIBIOTICS:  cefepime   IVPB      cefepime   IVPB 1000 milliGRAM(s) IV Intermittent every 8 hours  valACYclovir 1000 milliGRAM(s) Oral every 24 hours      All available historical data has been reviewed    ASSESSMENT  71yFemale with a PMH of breast cancer on letrozole and severe candida esophagitis presents with persistent nausea, vomiting, and diarrhea.     IMPRESSION    #diarrhea  -patient still having diarrhea ~4 times/day  -GI PCR and C dif negative    #candida esophagitis  -s/p EGD and biopsy on 12/3 which showed esophageal candidiasis, non-erosive gastritis, and normal duodenal mucosa  -patient was started on diflucan 200mg PO daily; per GI, 21 day course starting from 12/3  -reports clinical improvement within 4 days of starting antifungal and her odynophagia has resolved    #recurrent HSV  - currently asymptomatic; patient says she get lesions in buttock region    RECOMMENDATIONS  -PO dilflucan 400mg PO qd; no need for IV antifungal as patient has shown clinical improvement  -d/c cefepime  -no need for HSV prophylaxis with valtrex

## 2021-12-16 NOTE — PROGRESS NOTE ADULT - ASSESSMENT
Patient is a 71 year old female with PMH of IBS, pancreatitis, Breast Ca s/p radiation on Letrozole presents for persistent nausea and vomiting. HPI starts on 12/9 when patient was sent to the hospital by  for  severe александр esophagitis. Patient's symptoms started after she returned from [place] on 11/14. She had many bouts of n/v with the absence of BMs.   In the ED, /77, HR 88, RR 20, SpO2 99% on RA, afebrile. CT Abdomen and Pelvis with IV contrast showed no acute intra-abdominal pathology.  Labs showed leukopenia and lactate3.8. Patient received 2L of IVF and anti-emetics in ED. Admitted to medicine for further management of candida esophagitis.       #Severe candida esophagitis   #HAGMA likely secondary to lactic acidosis   - EGD: 12/3 -> severe candida esophagitis (confirmed on biopsy), HP neg  - d/c on PO zofran and PO diflucan but patient with recurrent N/V unable to tolerate PO   - s/p 2L IVF, zofran, pepcid, an metoclopromide in ED  - increased iv antifungals  - HIV non reactive   - f/u hep B  - Blood cultures (12/9): no growth   - Urine cultures (12/9): no growth  - Stool studies (12/9) :  no growth  -Stool pcr negative, cdiff negative  - send flow cytometry today   - c/w IV diflucan while in paient   - c/w valtrex ppx for recurrent HSV on buttock  - Lactate  3.8, continue to trend   - MRSA PCR Result.: Negative(12.09.21)  - GI follow up  - F/u ID  - f/u blood cx, stool cx and c diff  - f/u procal and fungal cx      #Breast Cancer  #Leukopenia  - neutropenia and leukopenia   - heme/onc eval (follows with Dr. Guzman)   - WBC improved temporarily on recent admission due to Neupogen x1  - CT chest noted, patient will follow up with heme/onc as outpatient   - Hem Onc consult f/u  - send flow cytometry today    #HTN   - c/w losartan 100mg    #HLD  - continue with atorvastatin while inpatient     #Depression  - continue with wellbutrin xL 300mg PO daily     #Fibromyalgia  - continue with duloxetine 60mg PO daily     #Misc   - DVT ppx: HSQ   - GI ppx: protonix   - Activity: IAT  - Diet: CLD, advance as tolerated, can consider low residue if tolerates clear liquid  - Dispo: acute, from home   - Code status: full code

## 2021-12-16 NOTE — CONSULT NOTE ADULT - ASSESSMENT
Patient is a 71 year old female with PMH of IBS-D, pancreatitis one time as per patient due to gravol, Breast Ca s/p radiation on Letrozole presents for persistent nausea and vomiting. Patient's symptoms started after she returned from [place] on 11/14. She had many bouts of n/v with the absence of BMs. Patient came to the ED 11/26 for abdominal distension and inability to have a BM, CT AP did not show evidence of bowel obstruction or pneumoperitoneum at that time. Patient had EGD with Dr. Babin on 12/3/21 which showed esophageal candidiasis and was started on PO diflucan. Patient was not responding to PO antifungals so she was sent in for further work up on 12/9. During that admission patient was treated with IV Diflucan (transitioned to PO on d/c) and PO valtrex for HSV lesions on buttocks. Patients nausea was treated with zofran, and she was discharged with rx of PO zofran. During that admission patient was also found to be neutropenic, she was evaluated by hem onc and given Neupogen. Since discharge on 12/12/21, patient has had worsening nausea and vomiting unable to tolerated PO.      # Sever candida esophagitis:  - patient was sent home on diflucan 200 mg PO once a day  - EGD: 12/3--> sever candida esophagitis ( confirmed on biopsy), HP neg  - WBC: 1.58 on admission--> 4.47 ( w/o any treatment or Neupogen)  - high lactate on admission 3.8 --> trended down after IV fluid--> 0.8  - can tolerate liquid diet in hospital  -HIV negative    Rec:  - switched to IV fluconazole 200 mg IV--> may consider to increase the dose to 400 mg as she did not responded well to low dose   - hem/onc follow up for leukopenia ( on letrozole , dose not cause leukopenia)--> has appointment with Dr. Guzman   - inocencio PRN  - advance diet as tolerated  - follow up as outpatient with GI    #IBS-D:  - c/w amitriptyline  - C. diff and GI PCR negative  - Imodium as needed    #Persona history of Colon polyps:  - colonoscopy 10/29/21--> 3 polyps, 5-10mm, tubular adenoma    Rec:  Repeat colonoscopy in 3 years      - Please call GI [id number] or MS teams during weekdays till 5pm or call [phone] after 5 PM on weekdays and weekends  - Follow up with our [facility] located at [address] [place] [address]. Phone Number: [phone]         Patient is a 71 year old female with PMH of IBS-D, pancreatitis one time as per patient due to gravol, Breast Ca s/p radiation on Letrozole presents for persistent nausea and vomiting. Patient's symptoms started after she returned from [place] on 11/14. She had many bouts of n/v with the absence of BMs. Patient came to the ED 11/26 for abdominal distension and inability to have a BM, CT AP did not show evidence of bowel obstruction or pneumoperitoneum at that time. Patient had EGD with Dr. Babin on 12/3/21 which showed esophageal candidiasis and was started on PO diflucan. Patient was not responding to PO antifungals so she was sent in for further work up on 12/9. During that admission patient was treated with IV Diflucan (transitioned to PO on d/c) and PO valtrex for HSV lesions on buttocks. Patients nausea was treated with zofran, and she was discharged with rx of PO zofran. During that admission patient was also found to be neutropenic, she was evaluated by hem onc and given Neupogen. Since discharge on 12/12/21, patient has had worsening nausea and vomiting unable to tolerated PO.      # Sever candida esophagitis:  - patient was sent home on diflucan 200 mg PO once a day  - EGD: 12/3--> sever candida esophagitis ( confirmed on biopsy), HP neg  - WBC: 1.58 on admission--> 4.47 ( w/o any treatment or Neupogen)--> maybe due to diflucan???  - high lactate on admission 3.8 --> trended down after IV fluid--> 0.8  - can tolerate liquid diet in hospital  -HIV negative    Rec:  - switched to IV fluconazole 200 mg IV--> c/w fluconazole 200 mg PO for total 21 days from 12/3   - hem/onc follow up for leukopenia ( on letrozole , dose not cause leukopenia)--> has appointment with Dr. Guzman   - nildafran PRN  - advance diet as tolerated  - No need for endoscopic procedure  - follow up as outpatient with GI    #IBS-D:  - c/w amitriptyline  - C. diff and GI PCR negative  - Imodium as needed    #Persona history of Colon polyps:  - colonoscopy 10/29/21--> 3 polyps, 5-10mm, tubular adenoma    Rec:  Repeat colonoscopy in 3 years      - Please call GI [id number] or MS teams during weekdays till 5pm or call [phone] after 5 PM on weekdays and weekends  - Follow up with our [facility] located at [address] [place] [address]. Phone Number: [phone]         Patient is a 71 year old female with PMH of IBS-D, pancreatitis one time as per patient due to gravol, Breast Ca s/p radiation on Letrozole presents for persistent nausea and vomiting. Patient's symptoms started after she returned from [place] on 11/14. She had many bouts of n/v with the absence of BMs. Patient came to the ED 11/26 for abdominal distension and inability to have a BM, CT AP did not show evidence of bowel obstruction or pneumoperitoneum at that time. Patient had EGD with Dr. Babin on 12/3/21 which showed esophageal candidiasis and was started on PO diflucan. Patient was not responding to PO antifungals so she was sent in for further work up on 12/9. During that admission patient was treated with IV Diflucan (transitioned to PO on d/c) and PO valtrex for HSV lesions on buttocks. Patients nausea was treated with zofran, and she was discharged with rx of PO zofran. During that admission patient was also found to be neutropenic, she was evaluated by hem onc and given Neupogen. Since discharge on 12/12/21, patient has had worsening nausea and vomiting unable to tolerated PO.      # Severe candida esophagitis:  - patient was sent home on diflucan 200 mg PO once a day  - EGD: 12/3--> sever candida esophagitis ( confirmed on biopsy), HP neg  - WBC: 1.58 on admission--> 4.47 ( w/o any treatment or Neupogen)--> maybe due to diflucan???  - high lactate on admission 3.8 --> trended down after IV fluid--> 0.8  - can tolerate liquid diet in hospital  -HIV negative    Rec:  - switched to IV fluconazole 200 mg IV--> c/w fluconazole 200 mg PO for total 21 days from 12/3   - hem/onc follow up for leukopenia ( on letrozole , dose not cause leukopenia)--> has appointment with Dr. Guzman   - nildafran PRN  - advance diet as tolerated  - No need for endoscopic procedure  - follow up as outpatient with GI    #IBS-D:  - c/w amitriptyline  - C. diff and GI PCR negative  - Imodium as needed    #Persona history of Colon polyps:  - colonoscopy 10/29/21--> 3 polyps, 5-10mm, tubular adenoma    Rec:  Repeat colonoscopy in 3 years      - Please call GI [id number] or MS teams during weekdays till 5pm or call [phone] after 5 PM on weekdays and weekends  - Follow up with our [facility] located at [address] [place] [address]. Phone Number: [phone]

## 2021-12-16 NOTE — CONSULT NOTE ADULT - ATTENDING COMMENTS
Counseled patient about diagnostic testing and treatment plan. All questions answered.
70 yo f with candida esophagitis  see above for details

## 2021-12-16 NOTE — CONSULT NOTE ADULT - SUBJECTIVE AND OBJECTIVE BOX
Gastroenterology Consultation:    Patient is a 71y old  Female who presents with a chief complaint of N/V (15 Dec 2021 11:58)      Admitted on: 21  HPI:  Patient is a 71 year old female with PMH of IBS-D, pancreatitis one time as per patient due to gravol, Breast Ca s/p radiation on Letrozole presents for persistent nausea and vomiting. Patient's symptoms started after she returned from [place] on . She had many bouts of n/v with the absence of BMs. Patient came to the ED  for abdominal distension and inability to have a BM, CT AP did not show evidence of bowel obstruction or pneumoperitoneum at that time. Patient had EGD with Dr. Babin on 12/3/21 which showed esophageal candidiasis and was started on PO diflucan. Patient was not responding to PO antifungals so she was sent in for further work up on . During that admission patient was treated with IV Diflucan (transitioned to PO on d/c) and PO valtrex for HSV lesions on buttocks. Patients nausea was treated with zofran, and she was discharged with rx of PO zofran. During that admission patient was also found to be neutropenic, she was evaluated by hem onc and given Neupogen. Since discharge on 21, patient has had worsening nausea and vomiting unable to tolerated PO.    In the ED, /77, HR 88, RR 20, SpO2 99% on RA, afebrile. CT Abdomen and Pelvis with IV contrast showed no acute intra-abdominal pathology.  Labs showed leukopenia and lactate3.8. Patient received 2L of IVF and anti-emetics in ED. Admitted to medicine for further management of candida esophagitis.        Prior records Reviewed (Y/N): Y  History obtained from person other than patient (Y/N): N    Prior EGD:  < from: EGD (21 @ 15:45) >  Impressions:    Esophageal candidiasis (Biopsy).    Erythema in the stomach compatible with non-erosive gastritis. (Biopsy).    Normal mucosa in the whole examined duodenum. (Biopsy).     < end of copied text >    Prior Colonoscopy:  < from: Colonoscopy (10.29.21 @ 08:45) >  Impressions:    Polyp (5 mm) in the rectum. (Polypectomy).    Polyp(7 mm) in the proximal descending colon. (Polypectomy).    Polyp (10 mm) in the mid-transverse colon. (Polypectomy).    External hemorrhoids.    Mild diverticulosis of the the left side of the colon.    Otherwise normal colon and terminal ileum (Biopsy).     < end of copied text >      PAST MEDICAL & SURGICAL HISTORY:  Pancreatitis, gallstone    Breast cancer  RT    Hypertension    Shingles rash    History of major abdominal surgery    History of seizures as a child    GERD (gastroesophageal reflux disease)     delivery delivered  x2    Abnormal cells in breast  RT breast bumpectomy        FAMILY HISTORY:  non-contributory    Social History:  Tobacco: N  Alcohol: N  Drugs: N    Home Medications:  amitriptyline 25 mg oral tablet: 1 tab(s) orally once a day (at bedtime) (07 Dec 2021 20:50)  DULoxetine 60 mg oral delayed release capsule: 1 cap(s) orally once a day (07 Dec 2021 20:50)  Femara 2.5 mg oral tablet: 1 tab(s) orally once a day (07 Dec 2021 20:50)  losartan 100 mg oral tablet: 1 tab(s) orally once a day (07 Dec 2021 20:50)  omeprazole 40 mg oral delayed release capsule: 1 cap(s) orally once a day (07 Dec 2021 20:50)  rosuvastatin 20 mg oral tablet: 1 tab(s) orally once a day (07 Dec 2021 20:50)  Wellbutrin  mg/24 hours oral tablet, extended release: 1 tab(s) orally every 24 hours (07 Dec 2021 20:50)  Xanax 0.25 mg oral tablet: 1 tab(s) orally once a day (at bedtime), As Needed (07 Dec 2021 20:51)    MEDICATIONS  (STANDING):  amitriptyline 25 milliGRAM(s) Oral at bedtime  atorvastatin 80 milliGRAM(s) Oral at bedtime  buPROPion XL (24-Hour) . 300 milliGRAM(s) Oral daily  cefepime   IVPB      cefepime   IVPB 1000 milliGRAM(s) IV Intermittent every 8 hours  dicyclomine 10 milliGRAM(s) Oral three times a day before meals  DULoxetine 60 milliGRAM(s) Oral daily  fluconAZOLE IVPB 200 milliGRAM(s) IV Intermittent every 24 hours  heparin   Injectable 5000 Unit(s) SubCutaneous every 12 hours  letrozole 2.5 milliGRAM(s) Oral daily  losartan 100 milliGRAM(s) Oral daily  pantoprazole  Injectable 40 milliGRAM(s) IV Push daily  simethicone 80 milliGRAM(s) Chew three times a day  sodium chloride 0.9%. 1000 milliLiter(s) (150 mL/Hr) IV Continuous <Continuous>  valACYclovir 1000 milliGRAM(s) Oral every 24 hours    MEDICATIONS  (PRN):  acetaminophen     Tablet .. 650 milliGRAM(s) Oral every 6 hours PRN Temp greater or equal to 38C (100.4F), Mild Pain (1 - 3)  ondansetron Injectable 4 milliGRAM(s) IV Push every 6 hours PRN Nausea and/or Vomiting      Allergies  penicillin (Unknown)      Review of Systems:   Constitutional:  No Fever, No Chills  ENT/Mouth:  No Hearing Changes,  No Difficulty Swallowing  Eyes:  No Eye Pain, No Vision Changes  Cardiovascular:  No Chest Pain, No Palpitations  Respiratory:  No Cough, No Dyspnea  Gastrointestinal:  As described in HPI  Musculoskeletal:  No Joint Swelling, No Back Pain  Skin:  No Skin Lesions, No Jaundice  Neuro:  No Syncope, No Dizziness  Heme/Lymph:  No Bruising, No Bleeding.          Physical Examination:  T(C): 35.6 (21 @ 04:51), Max: 36.4 (12-15-21 @ 19:58)  HR: 56 (21 @ 04:51) (52 - 57)  BP: 140/66 (- @ 04:51) (139/78 - 178/101)  RR: 18 (21 @ 04:51) (18 - 18)  SpO2: 95% (21 @ 04:51) (93% - 95%)        Constitutional: No acute distress.  Eyes:. Conjunctivae are clear, Sclera is non-icteric.  Ears Nose and Throat: The external ears are normal appearing,  Oral mucosa is pink and moist.  Respiratory:  No signs of respiratory distress. Lung sounds are clear bilaterally.  Cardiovascular:  S1 S2, Regular rate and rhythm.  GI: Abdomen is soft, symmetric, and non-tender without distention. There are no visible lesions. Bowel sounds are present and normoactive in all four quadrants. No masses, hepatomegaly, or splenomegaly are noted.   Neuro: No Tremor, No involuntary movements  Skin: No rashes, No Jaundice.          Data: (reviewed by attending)                        11.7   4.47  )-----------( 177      ( 15 Dec 2021 11:00 )             36.1     Hgb Trend:  11.7  12-15-21 @ 11:00  14.0  -14-21 @ 15:16      12-15    137  |  108  |  13  ----------------------------<  88  4.9   |  14<L>  |  0.9    Ca    8.5      15 Dec 2021 11:00  Phos  2.7     12-15  Mg     1.6     12-15    TPro  6.1  /  Alb  3.5  /  TBili  0.5  /  DBili  x   /  AST  33  /  ALT  11  /  AlkPhos  70  12-15    Liver panel trend:  TBili 0.5   /   AST 33   /   ALT 11   /   AlkP 70   /   Tptn 6.1   /   Alb 3.5    /   DBili --      15  TBili 0.4   /   AST 20   /   ALT 13   /   AlkP 128   /   Tptn 6.7   /   Alb 4.3    /   DBili --      -14  TBili 0.6   /   AST 22   /   ALT 13   /   AlkP 116   /   Tptn 6.4   /   Alb 4.0    /   DBili --      -12  TBili 0.6   /   AST 23   /   ALT 11   /   AlkP 99   /   Tptn 6.5   /   Alb 4.1    /   DBili --      12-11  TBili 0.5   /   AST 12   /   ALT 8   /   AlkP 63   /   Tptn 6.2   /   Alb 3.9    /   DBili --      12-10  TBili 0.5   /   AST 11   /   ALT 8   /   AlkP 56   /   Tptn 5.7   /   Alb 3.5    /   DBili --      -09  TBili 0.6   /   AST 12   /   ALT 7   /   AlkP 52   /   Tptn 5.1   /   Alb 3.2    /   DBili --      08  TBili 0.5   /   AST 21   /   ALT 10   /   AlkP 80   /   Tptn 7.0   /   Alb 4.3    /   DBili --      07          GI PCR Panel, Stool (collected 15 Dec 2021 17:54)  Source: .Stool Feces  Final Report (16 Dec 2021 04:53):    GI PCR Results: NOT detected    *******Please Note:*******    GI panel PCR evaluates for:    Campylobacter, Plesiomonas shigelloides, Salmonella,    Vibrio, Yersinia enterocolitica, Enteroaggregative    Escherichia coli (EAEC), Enteropathogenic E.coli (EPEC),    Enterotoxigenic E. coli (ETEC) lt/st, Shiga-like    toxin-producing E. coli (STEC) stx1/stx2,    Shigella/ Enteroinvasive E. coli (EIEC), Cryptosporidium,    Cyclospora cayetanensis, Entamoeba histolytica,    Giardia lamblia, Adenovirus F 40/41, Astrovirus,    Norovirus GI/GII, Rotavirus A, Sapovirus          Radiology:(reviewed by attending)

## 2021-12-16 NOTE — PROGRESS NOTE ADULT - SUBJECTIVE AND OBJECTIVE BOX
MANDY THOMPSON 71y Female  MRN#: [id number]     Hospital Day: 2d    Pt is currently admitted with the primary diagnosis of  DECREASED ORAL INTAKE;INTRACTABLE NAUSEA AND VOMITING;WEAKNESS        SUBJECTIVE     Overnight events  Diarrhea multiple episodes      Subjective complaints  Pt had multiple episodes of diarrheabut acc to the pt it has decreased in frequency and her vomiting has stopped she is able to tolerate clear liquids.                                              ----------------------------------------------------------  OBJECTIVE  PAST MEDICAL & SURGICAL HISTORY  Pancreatitis, gallstone    Breast cancer  RT    Hypertension    Shingles rash    History of major abdominal surgery    History of seizures as a child    GERD (gastroesophageal reflux disease)     delivery delivered  x2    Abnormal cells in breast  RT breast bumpectomy                                              -----------------------------------------------------------  ALLERGIES:  penicillin (Unknown)                                            ------------------------------------------------------------    HOME MEDICATIONS  Home Medications:  amitriptyline 25 mg oral tablet: 1 tab(s) orally once a day (at bedtime) (07 Dec 2021 20:50)  DULoxetine 60 mg oral delayed release capsule: 1 cap(s) orally once a day (07 Dec 2021 20:50)  Femara 2.5 mg oral tablet: 1 tab(s) orally once a day (07 Dec 2021 20:50)  losartan 100 mg oral tablet: 1 tab(s) orally once a day (07 Dec 2021 20:50)  omeprazole 40 mg oral delayed release capsule: 1 cap(s) orally once a day (07 Dec 2021 20:50)  rosuvastatin 20 mg oral tablet: 1 tab(s) orally once a day (07 Dec 2021 20:50)  Wellbutrin  mg/24 hours oral tablet, extended release: 1 tab(s) orally every 24 hours (07 Dec 2021 20:50)  Xanax 0.25 mg oral tablet: 1 tab(s) orally once a day (at bedtime), As Needed (07 Dec 2021 20:51)                           MEDICATIONS:  STANDING MEDICATIONS  amitriptyline 25 milliGRAM(s) Oral at bedtime  atorvastatin 80 milliGRAM(s) Oral at bedtime  buPROPion XL (24-Hour) . 300 milliGRAM(s) Oral daily  cefepime   IVPB      cefepime   IVPB 1000 milliGRAM(s) IV Intermittent every 8 hours  dicyclomine 10 milliGRAM(s) Oral three times a day before meals  DULoxetine 60 milliGRAM(s) Oral daily  heparin   Injectable 5000 Unit(s) SubCutaneous every 12 hours  letrozole 2.5 milliGRAM(s) Oral daily  losartan 100 milliGRAM(s) Oral daily  pantoprazole  Injectable 40 milliGRAM(s) IV Push daily  simethicone 80 milliGRAM(s) Chew three times a day  sodium chloride 0.9%. 1000 milliLiter(s) IV Continuous <Continuous>  valACYclovir 1000 milliGRAM(s) Oral every 24 hours    PRN MEDICATIONS  acetaminophen     Tablet .. 650 milliGRAM(s) Oral every 6 hours PRN  ondansetron Injectable 4 milliGRAM(s) IV Push every 6 hours PRN                                            ------------------------------------------------------------  VITAL SIGNS: Last 24 Hours  T(C): 35.6 (16 Dec 2021 04:51), Max: 36.4 (15 Dec 2021 19:58)  T(F): 96 (16 Dec 2021 04:51), Max: 97.5 (15 Dec 2021 19:58)  HR: 56 (16 Dec 2021 04:51) (52 - 57)  BP: 140/66 (16 Dec 2021 04:51) (139/78 - 178/101)  BP(mean): --  RR: 18 (16 Dec 2021 04:51) (18 - 18)  SpO2: 95% (16 Dec 2021 04:51) (93% - 95%)                                             --------------------------------------------------------------  LABS:                        11.7   4.47  )-----------( 177      ( 15 Dec 2021 11:00 )             36.1     12-15    137  |  108  |  13  ----------------------------<  88  4.9   |  14<L>  |  0.9    Ca    8.5      15 Dec 2021 11:00  Phos  2.7     12-15  Mg     1.6     12-15    TPro  6.1  /  Alb  3.5  /  TBili  0.5  /  DBili  x   /  AST  33  /  ALT  11  /  AlkPhos  70  12-15      Urinalysis Basic - ( 14 Dec 2021 15:16 )    Color: Light Yellow / Appearance: Clear / S.048 / pH: x  Gluc: x / Ketone: Negative  / Bili: Negative / Urobili: <2 mg/dL   Blood: x / Protein: Trace / Nitrite: Negative   Leuk Esterase: Negative / RBC: x / WBC x   Sq Epi: x / Non Sq Epi: x / Bacteria: x              GI PCR Panel, Stool (collected 15 Dec 2021 17:54)  Source: .Stool Feces  Final Report (16 Dec 2021 04:53):    GI PCR Results: NOT detected    *******Please Note:*******    GI panel PCR evaluates for:    Campylobacter, Plesiomonas shigelloides, Salmonella,    Vibrio, Yersinia enterocolitica, Enteroaggregative    Escherichia coli (EAEC), Enteropathogenic E.coli (EPEC),    Enterotoxigenic E. coli (ETEC) lt/st, Shiga-like    toxin-producing E. coli (STEC) stx1/stx2,    Shigella/ Enteroinvasive E. coli (EIEC), Cryptosporidium,    Cyclospora cayetanensis, Entamoeba histolytica,    Giardia lamblia, Adenovirus F 40/41, Astrovirus,    Norovirus GI/GII, Rotavirus A, Sapovirus        CARDIAC MARKERS ( 14 Dec 2021 15:16 )  x     / <0.01 ng/mL / x     / x     / x                                                  -------------------------------------------------------------  RADIOLOGY:                                              --------------------------------------------------------------    PHYSICAL EXAM:  GENERAL: NAD, lying in bed comfortably  HEAD:  Atraumatic, Normocephalic  EYES: EOMI, PERRLA, conjunctiva and sclera clear  ENT: Moist mucous membranes  NECK: Supple, No JVD  CHEST/LUNG: Clear to auscultation bilaterally; No rales, rhonchi, wheezing, or rubs. Unlabored respirations  HEART: regular rate and rhythm; No murmurs, rubs, or gallops  ABDOMEN: Bowel sounds present; Soft, Nontender, Nondistended.    EXTREMITIES: + Peripheral Pulses, brisk capillary refill. No clubbing, cyanosis, or edema  NERVOUS SYSTEM:  Alert & Oriented X3, speech clear. No focal deficits   SKIN: No rashes or lesions                                           --------------------------------------------------------------

## 2021-12-17 DIAGNOSIS — M79.7 FIBROMYALGIA: ICD-10-CM

## 2021-12-17 DIAGNOSIS — J98.11 ATELECTASIS: ICD-10-CM

## 2021-12-17 DIAGNOSIS — C50.919 MALIGNANT NEOPLASM OF UNSPECIFIED SITE OF UNSPECIFIED FEMALE BREAST: ICD-10-CM

## 2021-12-17 DIAGNOSIS — B37.81 CANDIDAL ESOPHAGITIS: ICD-10-CM

## 2021-12-17 DIAGNOSIS — K58.1 IRRITABLE BOWEL SYNDROME WITH CONSTIPATION: ICD-10-CM

## 2021-12-17 DIAGNOSIS — B00.89 OTHER HERPESVIRAL INFECTION: ICD-10-CM

## 2021-12-17 DIAGNOSIS — Z88.0 ALLERGY STATUS TO PENICILLIN: ICD-10-CM

## 2021-12-17 DIAGNOSIS — E87.2 ACIDOSIS: ICD-10-CM

## 2021-12-17 DIAGNOSIS — E78.5 HYPERLIPIDEMIA, UNSPECIFIED: ICD-10-CM

## 2021-12-17 DIAGNOSIS — D70.9 NEUTROPENIA, UNSPECIFIED: ICD-10-CM

## 2021-12-17 DIAGNOSIS — K21.00 GASTRO-ESOPHAGEAL REFLUX DISEASE WITH ESOPHAGITIS, WITHOUT BLEEDING: ICD-10-CM

## 2021-12-17 DIAGNOSIS — K58.0 IRRITABLE BOWEL SYNDROME WITH DIARRHEA: ICD-10-CM

## 2021-12-17 DIAGNOSIS — I10 ESSENTIAL (PRIMARY) HYPERTENSION: ICD-10-CM

## 2021-12-17 DIAGNOSIS — B37.7 CANDIDAL SEPSIS: ICD-10-CM

## 2021-12-17 LAB
ALBUMIN SERPL ELPH-MCNC: 3.6 G/DL — SIGNIFICANT CHANGE UP (ref 3.5–5.2)
ALP SERPL-CCNC: 62 U/L — SIGNIFICANT CHANGE UP (ref 30–115)
ALT FLD-CCNC: 9 U/L — SIGNIFICANT CHANGE UP (ref 0–41)
ANION GAP SERPL CALC-SCNC: 18 MMOL/L — HIGH (ref 7–14)
AST SERPL-CCNC: 12 U/L — SIGNIFICANT CHANGE UP (ref 0–41)
BASOPHILS # BLD AUTO: 0.02 K/UL — SIGNIFICANT CHANGE UP (ref 0–0.2)
BASOPHILS NFR BLD AUTO: 0.5 % — SIGNIFICANT CHANGE UP (ref 0–1)
BILIRUB SERPL-MCNC: 0.4 MG/DL — SIGNIFICANT CHANGE UP (ref 0.2–1.2)
BUN SERPL-MCNC: 4 MG/DL — LOW (ref 10–20)
CALCIUM SERPL-MCNC: 8.9 MG/DL — SIGNIFICANT CHANGE UP (ref 8.5–10.1)
CHLORIDE SERPL-SCNC: 110 MMOL/L — SIGNIFICANT CHANGE UP (ref 98–110)
CO2 SERPL-SCNC: 18 MMOL/L — SIGNIFICANT CHANGE UP (ref 17–32)
CREAT SERPL-MCNC: 0.7 MG/DL — SIGNIFICANT CHANGE UP (ref 0.7–1.5)
EOSINOPHIL # BLD AUTO: 0.14 K/UL — SIGNIFICANT CHANGE UP (ref 0–0.7)
EOSINOPHIL NFR BLD AUTO: 3.3 % — SIGNIFICANT CHANGE UP (ref 0–8)
GLUCOSE SERPL-MCNC: 116 MG/DL — HIGH (ref 70–99)
HBV SURFACE AB SER-ACNC: SIGNIFICANT CHANGE UP
HBV SURFACE AG SER-ACNC: SIGNIFICANT CHANGE UP
HCT VFR BLD CALC: 35.8 % — LOW (ref 37–47)
HGB BLD-MCNC: 11.7 G/DL — LOW (ref 12–16)
IMM GRANULOCYTES NFR BLD AUTO: 1 % — HIGH (ref 0.1–0.3)
LYMPHOCYTES # BLD AUTO: 1.27 K/UL — SIGNIFICANT CHANGE UP (ref 1.2–3.4)
LYMPHOCYTES # BLD AUTO: 30.2 % — SIGNIFICANT CHANGE UP (ref 20.5–51.1)
MAGNESIUM SERPL-MCNC: 1.5 MG/DL — LOW (ref 1.8–2.4)
MCHC RBC-ENTMCNC: 30.4 PG — SIGNIFICANT CHANGE UP (ref 27–31)
MCHC RBC-ENTMCNC: 32.7 G/DL — SIGNIFICANT CHANGE UP (ref 32–37)
MCV RBC AUTO: 93 FL — SIGNIFICANT CHANGE UP (ref 81–99)
MONOCYTES # BLD AUTO: 0.35 K/UL — SIGNIFICANT CHANGE UP (ref 0.1–0.6)
MONOCYTES NFR BLD AUTO: 8.3 % — SIGNIFICANT CHANGE UP (ref 1.7–9.3)
NEUTROPHILS # BLD AUTO: 2.39 K/UL — SIGNIFICANT CHANGE UP (ref 1.4–6.5)
NEUTROPHILS NFR BLD AUTO: 56.7 % — SIGNIFICANT CHANGE UP (ref 42.2–75.2)
NRBC # BLD: 0 /100 WBCS — SIGNIFICANT CHANGE UP (ref 0–0)
PLATELET # BLD AUTO: 190 K/UL — SIGNIFICANT CHANGE UP (ref 130–400)
POTASSIUM SERPL-MCNC: 3.8 MMOL/L — SIGNIFICANT CHANGE UP (ref 3.5–5)
POTASSIUM SERPL-SCNC: 3.8 MMOL/L — SIGNIFICANT CHANGE UP (ref 3.5–5)
PROCALCITONIN SERPL-MCNC: 11 NG/ML — HIGH (ref 0.02–0.1)
PROT SERPL-MCNC: 5.7 G/DL — LOW (ref 6–8)
RBC # BLD: 3.85 M/UL — LOW (ref 4.2–5.4)
RBC # FLD: 14.2 % — SIGNIFICANT CHANGE UP (ref 11.5–14.5)
SODIUM SERPL-SCNC: 146 MMOL/L — SIGNIFICANT CHANGE UP (ref 135–146)
T4 FREE SERPL-MCNC: 0.8 NG/DL — LOW (ref 0.9–1.8)
TSH SERPL-MCNC: 2.47 UIU/ML — SIGNIFICANT CHANGE UP (ref 0.27–4.2)
WBC # BLD: 4.21 K/UL — LOW (ref 4.8–10.8)
WBC # FLD AUTO: 4.21 K/UL — LOW (ref 4.8–10.8)

## 2021-12-17 PROCEDURE — 99233 SBSQ HOSP IP/OBS HIGH 50: CPT

## 2021-12-17 RX ORDER — NIFEDIPINE 30 MG
30 TABLET, EXTENDED RELEASE 24 HR ORAL DAILY
Refills: 0 | Status: COMPLETED | OUTPATIENT
Start: 2021-12-17 | End: 2021-12-17

## 2021-12-17 RX ORDER — LOPERAMIDE HCL 2 MG
2 TABLET ORAL EVERY 8 HOURS
Refills: 0 | Status: DISCONTINUED | OUTPATIENT
Start: 2021-12-17 | End: 2021-12-20

## 2021-12-17 RX ORDER — METOCLOPRAMIDE HCL 10 MG
5 TABLET ORAL EVERY 6 HOURS
Refills: 0 | Status: DISCONTINUED | OUTPATIENT
Start: 2021-12-17 | End: 2021-12-18

## 2021-12-17 RX ORDER — MAGNESIUM SULFATE 500 MG/ML
2 VIAL (ML) INJECTION
Refills: 0 | Status: DISCONTINUED | OUTPATIENT
Start: 2021-12-17 | End: 2021-12-17

## 2021-12-17 RX ORDER — MAGNESIUM OXIDE 400 MG ORAL TABLET 241.3 MG
400 TABLET ORAL
Refills: 0 | Status: DISCONTINUED | OUTPATIENT
Start: 2021-12-17 | End: 2021-12-18

## 2021-12-17 RX ORDER — NIFEDIPINE 30 MG
30 TABLET, EXTENDED RELEASE 24 HR ORAL DAILY
Refills: 0 | Status: DISCONTINUED | OUTPATIENT
Start: 2021-12-17 | End: 2021-12-17

## 2021-12-17 RX ORDER — SODIUM CHLORIDE 9 MG/ML
1000 INJECTION INTRAMUSCULAR; INTRAVENOUS; SUBCUTANEOUS
Refills: 0 | Status: DISCONTINUED | OUTPATIENT
Start: 2021-12-17 | End: 2021-12-18

## 2021-12-17 RX ORDER — NIFEDIPINE 30 MG
60 TABLET, EXTENDED RELEASE 24 HR ORAL DAILY
Refills: 0 | Status: DISCONTINUED | OUTPATIENT
Start: 2021-12-18 | End: 2021-12-20

## 2021-12-17 RX ADMIN — Medication 325 MILLIGRAM(S): at 15:29

## 2021-12-17 RX ADMIN — Medication 10 MILLIGRAM(S): at 12:10

## 2021-12-17 RX ADMIN — Medication 30 MILLIGRAM(S): at 15:24

## 2021-12-17 RX ADMIN — PANTOPRAZOLE SODIUM 40 MILLIGRAM(S): 20 TABLET, DELAYED RELEASE ORAL at 12:05

## 2021-12-17 RX ADMIN — FLUCONAZOLE 400 MILLIGRAM(S): 150 TABLET ORAL at 12:09

## 2021-12-17 RX ADMIN — HEPARIN SODIUM 5000 UNIT(S): 5000 INJECTION INTRAVENOUS; SUBCUTANEOUS at 18:33

## 2021-12-17 RX ADMIN — Medication 650 MILLIGRAM(S): at 09:24

## 2021-12-17 RX ADMIN — Medication 325 MILLIGRAM(S): at 05:58

## 2021-12-17 RX ADMIN — DULOXETINE HYDROCHLORIDE 60 MILLIGRAM(S): 30 CAPSULE, DELAYED RELEASE ORAL at 12:09

## 2021-12-17 RX ADMIN — Medication 25 MILLIGRAM(S): at 21:46

## 2021-12-17 RX ADMIN — Medication 25 GRAM(S): at 15:31

## 2021-12-17 RX ADMIN — ATORVASTATIN CALCIUM 80 MILLIGRAM(S): 80 TABLET, FILM COATED ORAL at 21:47

## 2021-12-17 RX ADMIN — SIMETHICONE 80 MILLIGRAM(S): 80 TABLET, CHEWABLE ORAL at 05:59

## 2021-12-17 RX ADMIN — LETROZOLE 2.5 MILLIGRAM(S): 2.5 TABLET, FILM COATED ORAL at 12:05

## 2021-12-17 RX ADMIN — Medication 30 MILLIGRAM(S): at 12:04

## 2021-12-17 RX ADMIN — Medication 10 MILLIGRAM(S): at 18:26

## 2021-12-17 RX ADMIN — Medication 10 MILLIGRAM(S): at 07:56

## 2021-12-17 RX ADMIN — HEPARIN SODIUM 5000 UNIT(S): 5000 INJECTION INTRAVENOUS; SUBCUTANEOUS at 06:01

## 2021-12-17 RX ADMIN — SIMETHICONE 80 MILLIGRAM(S): 80 TABLET, CHEWABLE ORAL at 15:28

## 2021-12-17 RX ADMIN — LOSARTAN POTASSIUM 100 MILLIGRAM(S): 100 TABLET, FILM COATED ORAL at 05:59

## 2021-12-17 RX ADMIN — Medication 325 MILLIGRAM(S): at 21:46

## 2021-12-17 RX ADMIN — BUPROPION HYDROCHLORIDE 300 MILLIGRAM(S): 150 TABLET, EXTENDED RELEASE ORAL at 12:08

## 2021-12-17 RX ADMIN — Medication 650 MILLIGRAM(S): at 15:30

## 2021-12-17 RX ADMIN — SIMETHICONE 80 MILLIGRAM(S): 80 TABLET, CHEWABLE ORAL at 21:46

## 2021-12-17 NOTE — PROGRESS NOTE ADULT - SUBJECTIVE AND OBJECTIVE BOX
MANDY THOMPSON  71y, Female    All available historical data reviewed    OVERNIGHT EVENTS:    no fevers  no dysphagia/odynophagia  diarrhea  ROS:  General: Denies rigors, nightsweats  HEENT: Denies headache, rhinorrhea, sore throat, eye pain  CV: Denies CP, palpitations  PULM: Denies wheezing, hemoptysis  GI: Denies hematemesis, hematochezia, melena  : Denies discharge, hematuria  MSK: Denies arthralgias, myalgias  SKIN: Denies rash, lesions  NEURO: Denies paresthesias, weakness  PSYCH: Denies depression, anxiety    VITALS:  T(F): 97.5, Max: 98.2 (12-16-21 @ 20:14)  HR: 54  BP: 179/93  RR: 18Vital Signs Last 24 Hrs  T(C): 36.4 (17 Dec 2021 05:24), Max: 36.8 (16 Dec 2021 20:14)  T(F): 97.5 (17 Dec 2021 05:24), Max: 98.2 (16 Dec 2021 20:14)  HR: 54 (17 Dec 2021 08:05) (54 - 66)  BP: 179/93 (17 Dec 2021 08:05) (169/100 - 185/90)  BP(mean): 131 (17 Dec 2021 05:24) (131 - 131)  RR: 18 (17 Dec 2021 05:24) (18 - 18)  SpO2: 94% (16 Dec 2021 20:14) (94% - 94%)    TESTS & MEASUREMENTS:                        11.5   4.03  )-----------( 191      ( 16 Dec 2021 15:02 )             35.0     12-16    140  |  108  |  5<L>  ----------------------------<  122<H>  4.5   |  17  |  0.6<L>    Ca    8.8      16 Dec 2021 15:02  Phos  2.7     12-15  Mg     2.0     12-16    TPro  5.9<L>  /  Alb  3.7  /  TBili  0.4  /  DBili  x   /  AST  22  /  ALT  11  /  AlkPhos  64  12-16    LIVER FUNCTIONS - ( 16 Dec 2021 15:02 )  Alb: 3.7 g/dL / Pro: 5.9 g/dL / ALK PHOS: 64 U/L / ALT: 11 U/L / AST: 22 U/L / GGT: x             GI PCR Panel, Stool (collected 12-15-21 @ 17:54)  Source: .Stool Feces  Final Report (12-16-21 @ 04:53):    GI PCR Results: NOT detected    *******Please Note:*******    GI panel PCR evaluates for:    Campylobacter, Plesiomonas shigelloides, Salmonella,    Vibrio, Yersinia enterocolitica, Enteroaggregative    Escherichia coli (EAEC), Enteropathogenic E.coli (EPEC),    Enterotoxigenic E. coli (ETEC) lt/st, Shiga-like    toxin-producing E. coli (STEC) stx1/stx2,    Shigella/ Enteroinvasive E. coli (EIEC), Cryptosporidium,    Cyclospora cayetanensis, Entamoeba histolytica,    Giardia lamblia, Adenovirus F 40/41, Astrovirus,    Norovirus GI/GII, Rotavirus A, Sapovirus    Culture - Fungal, Blood (collected 12-15-21 @ 11:00)  Source: .Blood Blood-Peripheral  Preliminary Report (12-17-21 @ 07:46):    Testing in progress    Culture - Blood (collected 12-15-21 @ 11:00)  Source: .Blood Blood-Peripheral  Preliminary Report (12-16-21 @ 23:02):    No growth to date.    Culture - Urine (collected 12-14-21 @ 15:16)  Source: Clean Catch Clean Catch (Midstream)  Preliminary Report (12-16-21 @ 14:24):    50,000 - 99,000 CFU/mL Gram positive organisms            RADIOLOGY & ADDITIONAL TESTS:  Personal review of radiological diagnostics performed  Echo and EKG results noted when applicable.     MEDICATIONS:  acetaminophen     Tablet .. 650 milliGRAM(s) Oral every 6 hours PRN  ALPRAZolam 0.5 milliGRAM(s) Oral at bedtime PRN  amitriptyline 25 milliGRAM(s) Oral at bedtime  atorvastatin 80 milliGRAM(s) Oral at bedtime  buPROPion XL (24-Hour) . 300 milliGRAM(s) Oral daily  dicyclomine 10 milliGRAM(s) Oral three times a day before meals  DULoxetine 60 milliGRAM(s) Oral daily  fluconAZOLE   Tablet 400 milliGRAM(s) Oral daily  heparin   Injectable 5000 Unit(s) SubCutaneous every 12 hours  letrozole 2.5 milliGRAM(s) Oral daily  losartan 100 milliGRAM(s) Oral daily  ondansetron Injectable 4 milliGRAM(s) IV Push every 6 hours PRN  pantoprazole  Injectable 40 milliGRAM(s) IV Push daily  simethicone 80 milliGRAM(s) Chew three times a day  sodium bicarbonate 325 milliGRAM(s) Oral three times a day  sodium chloride 0.9%. 1000 milliLiter(s) IV Continuous <Continuous>      ANTIBIOTICS:  fluconAZOLE   Tablet 400 milliGRAM(s) Oral daily

## 2021-12-17 NOTE — PROGRESS NOTE ADULT - SUBJECTIVE AND OBJECTIVE BOX
MANDY THOMPSON 71y Female  MRN#: [id number]     Hospital Day: 3d    Pt is currently admitted with the primary diagnosis of  DECREASED ORAL INTAKE;INTRACTABLE NAUSEA AND VOMITING;WEAKNESS        SUBJECTIVE     Overnight events  none      Subjective complaints  Pt was seen this morning. Acc to the pt her diarrhea is improving with less frequent episodes. And she was able to tolerate clear liquid diet.                                          ----------------------------------------------------------  OBJECTIVE  PAST MEDICAL & SURGICAL HISTORY  Pancreatitis, gallstone    Breast cancer  RT    Hypertension    Shingles rash    History of major abdominal surgery    History of seizures as a child    GERD (gastroesophageal reflux disease)     delivery delivered  x2    Abnormal cells in breast  RT breast bumpectomy                                              -----------------------------------------------------------  ALLERGIES:  penicillin (Unknown)                                            ------------------------------------------------------------    HOME MEDICATIONS  Home Medications:  amitriptyline 25 mg oral tablet: 1 tab(s) orally once a day (at bedtime) (07 Dec 2021 20:50)  DULoxetine 60 mg oral delayed release capsule: 1 cap(s) orally once a day (07 Dec 2021 20:50)  Femara 2.5 mg oral tablet: 1 tab(s) orally once a day (07 Dec 2021 20:50)  losartan 100 mg oral tablet: 1 tab(s) orally once a day (07 Dec 2021 20:50)  omeprazole 40 mg oral delayed release capsule: 1 cap(s) orally once a day (07 Dec 2021 20:50)  rosuvastatin 20 mg oral tablet: 1 tab(s) orally once a day (07 Dec 2021 20:50)  Wellbutrin  mg/24 hours oral tablet, extended release: 1 tab(s) orally every 24 hours (07 Dec 2021 20:50)  Xanax 0.25 mg oral tablet: 1 tab(s) orally once a day (at bedtime), As Needed (07 Dec 2021 20:51)                           MEDICATIONS:  STANDING MEDICATIONS  amitriptyline 25 milliGRAM(s) Oral at bedtime  atorvastatin 80 milliGRAM(s) Oral at bedtime  buPROPion XL (24-Hour) . 300 milliGRAM(s) Oral daily  dicyclomine 10 milliGRAM(s) Oral three times a day before meals  DULoxetine 60 milliGRAM(s) Oral daily  fluconAZOLE   Tablet 400 milliGRAM(s) Oral daily  heparin   Injectable 5000 Unit(s) SubCutaneous every 12 hours  letrozole 2.5 milliGRAM(s) Oral daily  losartan 100 milliGRAM(s) Oral daily  pantoprazole  Injectable 40 milliGRAM(s) IV Push daily  simethicone 80 milliGRAM(s) Chew three times a day  sodium bicarbonate 325 milliGRAM(s) Oral three times a day  sodium chloride 0.9%. 1000 milliLiter(s) IV Continuous <Continuous>    PRN MEDICATIONS  acetaminophen     Tablet .. 650 milliGRAM(s) Oral every 6 hours PRN  ALPRAZolam 0.5 milliGRAM(s) Oral at bedtime PRN  ondansetron Injectable 4 milliGRAM(s) IV Push every 6 hours PRN                                            ------------------------------------------------------------  VITAL SIGNS: Last 24 Hours  T(C): 36.4 (17 Dec 2021 05:24), Max: 36.8 (16 Dec 2021 20:14)  T(F): 97.5 (17 Dec 2021 05:24), Max: 98.2 (16 Dec 2021 20:14)  HR: 54 (17 Dec 2021 08:05) (54 - 66)  BP: 179/93 (17 Dec 2021 08:05) (169/100 - 185/90)  BP(mean): 131 (17 Dec 2021 05:24) (131 - 131)  RR: 18 (17 Dec 2021 05:24) (18 - 18)  SpO2: 94% (16 Dec 2021 20:14) (94% - 94%)                                             --------------------------------------------------------------  LABS:                        11.5   4.03  )-----------( 191      ( 16 Dec 2021 15:02 )             35.0     12-16    140  |  108  |  5<L>  ----------------------------<  122<H>  4.5   |  17  |  0.6<L>    Ca    8.8      16 Dec 2021 15:02  Phos  2.7     12-15  Mg     2.0     12-16    TPro  5.9<L>  /  Alb  3.7  /  TBili  0.4  /  DBili  x   /  AST  22  /  ALT  11  /  AlkPhos  64  12-16                GI PCR Panel, Stool (collected 15 Dec 2021 17:54)  Source: .Stool Feces  Final Report (16 Dec 2021 04:53):    GI PCR Results: NOT detected    *******Please Note:*******    GI panel PCR evaluates for:    Campylobacter, Plesiomonas shigelloides, Salmonella,    Vibrio, Yersinia enterocolitica, Enteroaggregative    Escherichia coli (EAEC), Enteropathogenic E.coli (EPEC),    Enterotoxigenic E. coli (ETEC) lt/st, Shiga-like    toxin-producing E. coli (STEC) stx1/stx2,    Shigella/ Enteroinvasive E. coli (EIEC), Cryptosporidium,    Cyclospora cayetanensis, Entamoeba histolytica,    Giardia lamblia, Adenovirus F 40/41, Astrovirus,    Norovirus GI/GII, Rotavirus A, Sapovirus    Culture - Fungal, Blood (collected 15 Dec 2021 11:00)  Source: .Blood Blood-Peripheral  Preliminary Report (17 Dec 2021 07:46):    Testing in progress    Culture - Blood (collected 15 Dec 2021 11:00)  Source: .Blood Blood-Peripheral  Preliminary Report (16 Dec 2021 23:02):    No growth to date.    Culture - Urine (collected 14 Dec 2021 15:16)  Source: Clean Catch Clean Catch (Midstream)  Preliminary Report (16 Dec 2021 14:24):    50,000 - 99,000 CFU/mL Gram positive organisms                                                    -------------------------------------------------------------  RADIOLOGY:                                              --------------------------------------------------------------    PHYSICAL EXAM:  GENERAL: NAD, lying in bed comfortably  HEAD:  Atraumatic, Normocephalic  EYES: EOMI, PERRLA, conjunctiva and sclera clear  ENT: Moist mucous membranes  NECK: Supple, No JVD  CHEST/LUNG: Clear to auscultation bilaterally; No rales, rhonchi, wheezing, or rubs. Unlabored respirations  HEART: regular rate and rhythm; No murmurs, rubs, or gallops  ABDOMEN: Bowel sounds present; Soft, Nontender, Nondistended.    EXTREMITIES: + Peripheral Pulses, brisk capillary refill. No clubbing, cyanosis, or edema  NERVOUS SYSTEM:  Alert & Oriented X3, speech clear. No focal deficits   SKIN: No rashes or lesions                                           --------------------------------------------------------------

## 2021-12-17 NOTE — PROGRESS NOTE ADULT - ASSESSMENT
71yFemale with a PMH of breast cancer on letrozole and severe candida esophagitis presents with persistent nausea, vomiting, and diarrhea.     IMPRESSION    #diarrhea  -GI PCR and C dif negative    #candida esophagitis : resolved  -s/p EGD and biopsy on 12/3 which showed esophageal candidiasis, non-erosive gastritis, and normal duodenal mucosa  -patient was started on diflucan 200mg PO daily; 21 day course starting from 12/3  -reports clinical improvement within 4 days of starting antifungal and her odynophagia has resolved    #recurrent HSV  - currently asymptomatic; patient says she get lesions in buttock region    RECOMMENDATIONS  -PO dilflucan 200 mg PO q24h for a total of 21d  -no need for HSV prophylaxis with valtrex  -lomotil  Please do not hesitate to recall ID if any questions arise either through my [employer] or through [employer]   Recommendations ( diagnostic/therapeutic ) discussed with housestaff

## 2021-12-17 NOTE — PROGRESS NOTE ADULT - ASSESSMENT
Patient is a 71 year old female with PMH of IBS, pancreatitis, Breast Ca s/p radiation on Letrozole presents for persistent nausea and vomiting. HPI starts on 12/9 when patient was sent to the hospital by  for  severe александр esophagitis. Patient's symptoms started after she returned from [place] on 11/14. She had many bouts of n/v with the absence of BMs.   In the ED, /77, HR 88, RR 20, SpO2 99% on RA, afebrile. CT Abdomen and Pelvis with IV contrast showed no acute intra-abdominal pathology.  Labs showed leukopenia and lactate3.8. Patient received 2L of IVF and anti-emetics in ED. Admitted to medicine for further management of candida esophagitis.       #Severe candida esophagitis   #HAGMA likely secondary to lactic acidosis   - EGD: 12/3 -> severe candida esophagitis (confirmed on biopsy), HP neg  - d/c on PO zofran and PO diflucan but patient with recurrent N/V unable to tolerate PO   - increased iv antifungals  - HIV non reactive   - Hep C negative  - f/u hep B  - Blood cultures (12/9): no growth   - Urine cultures (12/9): no growth  - Stool studies (12/9) :  no growth  - Stool pcr negative, cdiff negative  - switch to p/o antifungal  - d/c valacyclovir  - Lactate 3.8 downtrending to 0.8  - MRSA PCR Result.: Negative(12.09.21)  - GI recc apreciated  - ID recc appreciated  -  blood cx negative 12/15  - F/u procal   - F/u fungal cx      #Breast Cancer  #Leukopenia  - neutropenia and leukopenia   - heme/onc eval (follows with Dr. Guzman)   - WBC improved temporarily on recent admission due to Neupogen x1  - CT chest noted    #HTN   - c/w losartan 100mg  - Bp runs high, will consider adding antihypertensives    #HLD  - continue with atorvastatin while inpatient     #Depression  - continue with wellbutrin xL 300mg PO daily     #Fibromyalgia  - continue with duloxetine 60mg PO daily     #Misc   - DVT ppx: HSQ   - GI ppx: protonix   - Activity: IAT  - Diet: CLD, advance as tolerated, can consider low residue if tolerates clear liquid  - Dispo: acute, from home   - Code status: full code   Patient is a 71 year old female with PMH of IBS, pancreatitis, Breast Ca s/p radiation on Letrozole presents for persistent nausea and vomiting. HPI starts on 12/9 when patient was sent to the hospital by  for  severe александр esophagitis. Patient's symptoms started after she returned from [place] on 11/14. She had many bouts of n/v with the absence of BMs.   In the ED, /77, HR 88, RR 20, SpO2 99% on RA, afebrile. CT Abdomen and Pelvis with IV contrast showed no acute intra-abdominal pathology.  Labs showed leukopenia and lactate3.8. Patient received 2L of IVF and anti-emetics in ED. Admitted to medicine for further management of candida esophagitis.         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  - EGD: 12/3 -> severe candida esophagitis (confirmed on biopsy), HP neg  - d/c on PO zofran and PO diflucan but patient with recurrent N/V unable to tolerate PO   - increased iv antifungals  - HIV non reactive   - Hep C negative  - f/u hep B  - Blood cultures (12/9): no growth   - Urine cultures (12/9): no growth  - Stool studies (12/9) :  no growth  - Stool pcr negative, cdiff negative  - switch to p/o antifungal  - d/c valacyclovir  - Lactate 3.8 downtrending to 0.8  - MRSA PCR Result.: Negative(12.09.21)  - GI recc apreciated  - ID recc appreciated  - blood cx negative 12/15  - F/u procal   - F/u fungal cx      #Breast Cancer  #Leukopenia  - neutropenia and leukopenia   - heme/onc eval (follows with Dr. Guzmna)   - WBC improved temporarily on recent admission due to Neupogen x1  - CT chest noted    #HTN   - c/w losartan 100mg  - Bp runs high, will consider adding antihypertensives    #HLD  - continue with atorvastatin while inpatient     #Depression  - continue with wellbutrin xL 300mg PO daily     #Fibromyalgia  - continue with duloxetine 60mg PO daily     #Misc   - DVT ppx: HSQ   - GI ppx: protonix   - Activity: IAT  - Diet: CLD, will switch back to CLD as pt did not tolerate advanced diet  - Dispo: acute, from home   - Code status: full code

## 2021-12-18 LAB
-  AMPICILLIN: SIGNIFICANT CHANGE UP
-  CIPROFLOXACIN: SIGNIFICANT CHANGE UP
-  LEVOFLOXACIN: SIGNIFICANT CHANGE UP
-  NITROFURANTOIN: SIGNIFICANT CHANGE UP
-  TETRACYCLINE: SIGNIFICANT CHANGE UP
-  VANCOMYCIN: SIGNIFICANT CHANGE UP
ALBUMIN SERPL ELPH-MCNC: 4.3 G/DL — SIGNIFICANT CHANGE UP (ref 3.5–5.2)
ALP SERPL-CCNC: 75 U/L — SIGNIFICANT CHANGE UP (ref 30–115)
ALT FLD-CCNC: 10 U/L — SIGNIFICANT CHANGE UP (ref 0–41)
ANION GAP SERPL CALC-SCNC: 19 MMOL/L — HIGH (ref 7–14)
AST SERPL-CCNC: 13 U/L — SIGNIFICANT CHANGE UP (ref 0–41)
BASOPHILS # BLD AUTO: 0.05 K/UL — SIGNIFICANT CHANGE UP (ref 0–0.2)
BASOPHILS NFR BLD AUTO: 0.9 % — SIGNIFICANT CHANGE UP (ref 0–1)
BILIRUB SERPL-MCNC: 2.4 MG/DL — HIGH (ref 0.2–1.2)
BUN SERPL-MCNC: 4 MG/DL — LOW (ref 10–20)
CALCIUM SERPL-MCNC: 9.5 MG/DL — SIGNIFICANT CHANGE UP (ref 8.5–10.1)
CHLORIDE SERPL-SCNC: 104 MMOL/L — SIGNIFICANT CHANGE UP (ref 98–110)
CO2 SERPL-SCNC: 19 MMOL/L — SIGNIFICANT CHANGE UP (ref 17–32)
CREAT SERPL-MCNC: 0.6 MG/DL — LOW (ref 0.7–1.5)
CULTURE RESULTS: SIGNIFICANT CHANGE UP
EOSINOPHIL # BLD AUTO: 0.17 K/UL — SIGNIFICANT CHANGE UP (ref 0–0.7)
EOSINOPHIL NFR BLD AUTO: 3 % — SIGNIFICANT CHANGE UP (ref 0–8)
GLUCOSE SERPL-MCNC: 103 MG/DL — HIGH (ref 70–99)
HCT VFR BLD CALC: 42.4 % — SIGNIFICANT CHANGE UP (ref 37–47)
HGB BLD-MCNC: 14.3 G/DL — SIGNIFICANT CHANGE UP (ref 12–16)
IMM GRANULOCYTES NFR BLD AUTO: 2.1 % — HIGH (ref 0.1–0.3)
LYMPHOCYTES # BLD AUTO: 2.16 K/UL — SIGNIFICANT CHANGE UP (ref 1.2–3.4)
LYMPHOCYTES # BLD AUTO: 37.6 % — SIGNIFICANT CHANGE UP (ref 20.5–51.1)
MAGNESIUM SERPL-MCNC: 1.5 MG/DL — LOW (ref 1.8–2.4)
MCHC RBC-ENTMCNC: 30.6 PG — SIGNIFICANT CHANGE UP (ref 27–31)
MCHC RBC-ENTMCNC: 33.7 G/DL — SIGNIFICANT CHANGE UP (ref 32–37)
MCV RBC AUTO: 90.8 FL — SIGNIFICANT CHANGE UP (ref 81–99)
METHOD TYPE: SIGNIFICANT CHANGE UP
MONOCYTES # BLD AUTO: 0.32 K/UL — SIGNIFICANT CHANGE UP (ref 0.1–0.6)
MONOCYTES NFR BLD AUTO: 5.6 % — SIGNIFICANT CHANGE UP (ref 1.7–9.3)
NEUTROPHILS # BLD AUTO: 2.93 K/UL — SIGNIFICANT CHANGE UP (ref 1.4–6.5)
NEUTROPHILS NFR BLD AUTO: 50.8 % — SIGNIFICANT CHANGE UP (ref 42.2–75.2)
NRBC # BLD: 0 /100 WBCS — SIGNIFICANT CHANGE UP (ref 0–0)
ORGANISM # SPEC MICROSCOPIC CNT: SIGNIFICANT CHANGE UP
ORGANISM # SPEC MICROSCOPIC CNT: SIGNIFICANT CHANGE UP
PLATELET # BLD AUTO: 187 K/UL — SIGNIFICANT CHANGE UP (ref 130–400)
POTASSIUM SERPL-MCNC: 4.1 MMOL/L — SIGNIFICANT CHANGE UP (ref 3.5–5)
POTASSIUM SERPL-SCNC: 4.1 MMOL/L — SIGNIFICANT CHANGE UP (ref 3.5–5)
PROCALCITONIN SERPL-MCNC: 7.94 NG/ML — HIGH (ref 0.02–0.1)
PROT SERPL-MCNC: 6.9 G/DL — SIGNIFICANT CHANGE UP (ref 6–8)
RBC # BLD: 4.67 M/UL — SIGNIFICANT CHANGE UP (ref 4.2–5.4)
RBC # FLD: 14.4 % — SIGNIFICANT CHANGE UP (ref 11.5–14.5)
SODIUM SERPL-SCNC: 142 MMOL/L — SIGNIFICANT CHANGE UP (ref 135–146)
SPECIMEN SOURCE: SIGNIFICANT CHANGE UP
WBC # BLD: 5.75 K/UL — SIGNIFICANT CHANGE UP (ref 4.8–10.8)
WBC # FLD AUTO: 5.75 K/UL — SIGNIFICANT CHANGE UP (ref 4.8–10.8)

## 2021-12-18 PROCEDURE — 99233 SBSQ HOSP IP/OBS HIGH 50: CPT

## 2021-12-18 RX ORDER — MAGNESIUM SULFATE 500 MG/ML
2 VIAL (ML) INJECTION
Refills: 0 | Status: COMPLETED | OUTPATIENT
Start: 2021-12-18 | End: 2021-12-18

## 2021-12-18 RX ORDER — MAGNESIUM OXIDE 400 MG ORAL TABLET 241.3 MG
400 TABLET ORAL
Refills: 0 | Status: DISCONTINUED | OUTPATIENT
Start: 2021-12-18 | End: 2021-12-20

## 2021-12-18 RX ORDER — ONDANSETRON 8 MG/1
4 TABLET, FILM COATED ORAL EVERY 6 HOURS
Refills: 0 | Status: DISCONTINUED | OUTPATIENT
Start: 2021-12-18 | End: 2021-12-20

## 2021-12-18 RX ORDER — TRAMADOL HYDROCHLORIDE 50 MG/1
25 TABLET ORAL EVERY 6 HOURS
Refills: 0 | Status: DISCONTINUED | OUTPATIENT
Start: 2021-12-18 | End: 2021-12-20

## 2021-12-18 RX ADMIN — LETROZOLE 2.5 MILLIGRAM(S): 2.5 TABLET, FILM COATED ORAL at 11:13

## 2021-12-18 RX ADMIN — MAGNESIUM OXIDE 400 MG ORAL TABLET 400 MILLIGRAM(S): 241.3 TABLET ORAL at 13:50

## 2021-12-18 RX ADMIN — Medication 25 GRAM(S): at 11:46

## 2021-12-18 RX ADMIN — MAGNESIUM OXIDE 400 MG ORAL TABLET 400 MILLIGRAM(S): 241.3 TABLET ORAL at 17:21

## 2021-12-18 RX ADMIN — Medication 10 MILLIGRAM(S): at 08:38

## 2021-12-18 RX ADMIN — SIMETHICONE 80 MILLIGRAM(S): 80 TABLET, CHEWABLE ORAL at 21:42

## 2021-12-18 RX ADMIN — ATORVASTATIN CALCIUM 80 MILLIGRAM(S): 80 TABLET, FILM COATED ORAL at 21:42

## 2021-12-18 RX ADMIN — MAGNESIUM OXIDE 400 MG ORAL TABLET 400 MILLIGRAM(S): 241.3 TABLET ORAL at 11:20

## 2021-12-18 RX ADMIN — Medication 5 MILLIGRAM(S): at 11:16

## 2021-12-18 RX ADMIN — Medication 25 MILLIGRAM(S): at 21:42

## 2021-12-18 RX ADMIN — BUPROPION HYDROCHLORIDE 300 MILLIGRAM(S): 150 TABLET, EXTENDED RELEASE ORAL at 11:13

## 2021-12-18 RX ADMIN — SIMETHICONE 80 MILLIGRAM(S): 80 TABLET, CHEWABLE ORAL at 13:49

## 2021-12-18 RX ADMIN — HEPARIN SODIUM 5000 UNIT(S): 5000 INJECTION INTRAVENOUS; SUBCUTANEOUS at 17:20

## 2021-12-18 RX ADMIN — ONDANSETRON 4 MILLIGRAM(S): 8 TABLET, FILM COATED ORAL at 08:45

## 2021-12-18 RX ADMIN — ONDANSETRON 4 MILLIGRAM(S): 8 TABLET, FILM COATED ORAL at 21:51

## 2021-12-18 RX ADMIN — PANTOPRAZOLE SODIUM 40 MILLIGRAM(S): 20 TABLET, DELAYED RELEASE ORAL at 11:15

## 2021-12-18 RX ADMIN — DULOXETINE HYDROCHLORIDE 60 MILLIGRAM(S): 30 CAPSULE, DELAYED RELEASE ORAL at 11:14

## 2021-12-18 RX ADMIN — Medication 325 MILLIGRAM(S): at 21:43

## 2021-12-18 RX ADMIN — FLUCONAZOLE 400 MILLIGRAM(S): 150 TABLET ORAL at 11:25

## 2021-12-18 RX ADMIN — Medication 10 MILLIGRAM(S): at 11:14

## 2021-12-18 RX ADMIN — Medication 10 MILLIGRAM(S): at 17:21

## 2021-12-18 RX ADMIN — Medication 25 GRAM(S): at 13:50

## 2021-12-18 RX ADMIN — HEPARIN SODIUM 5000 UNIT(S): 5000 INJECTION INTRAVENOUS; SUBCUTANEOUS at 06:18

## 2021-12-18 RX ADMIN — Medication 0.5 MILLIGRAM(S): at 21:51

## 2021-12-18 RX ADMIN — Medication 325 MILLIGRAM(S): at 06:18

## 2021-12-18 RX ADMIN — SIMETHICONE 80 MILLIGRAM(S): 80 TABLET, CHEWABLE ORAL at 06:17

## 2021-12-18 RX ADMIN — MAGNESIUM OXIDE 400 MG ORAL TABLET 400 MILLIGRAM(S): 241.3 TABLET ORAL at 08:39

## 2021-12-18 NOTE — CONSULT NOTE ADULT - ASSESSMENT
esophageal candidiasis ,without much improvement   H/O IBS follows with gi as outpt  being followed by ID also   cbc for last few days is normal and wbc is good  pt got one dose of neupogen at last admission   not on any chemo ,HIV negative ,  so at present no indication to do bone marrow test to look for lymphoma or leukemia   send immunoglobulin levels   IGG   to look for hypogammaglubenemia and need for IVIG   GI revaluation for repeat EGD  NEED to eat solids ,nutriition consult befor discharge ,  will follow with you      gabriela sethi MD

## 2021-12-18 NOTE — PROGRESS NOTE ADULT - ASSESSMENT
Patient is a 71 year old female with PMH of IBS, pancreatitis, Breast Ca s/p radiation on Letrozole presents for persistent nausea and vomiting. HPI starts on 12/9 when patient was sent to the hospital by  for  severe александр esophagitis. Patient's symptoms started after she returned from [place] on 11/14. She had many bouts of n/v with the absence of BMs.   In the ED, /77, HR 88, RR 20, SpO2 99% on RA, afebrile. CT Abdomen and Pelvis with IV contrast showed no acute intra-abdominal pathology.  Labs showed leukopenia and lactate3.8. Patient received 2L of IVF and anti-emetics in ED. Admitted to medicine for further management of candida esophagitis.         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  - EGD: 12/3 -> severe candida esophagitis (confirmed on biopsy), HP neg  - d/c on PO zofran and PO diflucan but patient with recurrent N/V unable to tolerate PO   -  iv antifungals switched to oral  - HIV non reactive   - Hep C negative  - hep B negative  - Blood cultures (12/9): no growth   - Urine cultures (12/9): no growth  - Stool studies (12/9) :  no growth  - Stool pcr negative, cdiff negative  - switch to p/o antifungal  - d/c valacyclovir  - Lactate 3.8 downtrending to 0.8  - MRSA PCR Result.: Negative(12.09.21)  - GI recc apreciated  - ID recc appreciated  - blood cx negative 12/15  - procal 11-->7.9 today  - F/u fungal cx  - F/u Ig panel      #Breast Cancer  #Leukopenia  - neutropenia and leukopenia   - heme/onc eval (follows with Dr. Guzman)   - Called Dr. Tang ([phone]) for Heme Onc follow up, said they will visit her, will f/u with heme/onc  - WBC improved temporarily on recent admission due to Neupogen x1  - CT chest noted    #HTN   - c/w losartan 100mg  - Bp runs high, added Nifedipine yesterday    #HLD  - continue with atorvastatin while inpatient     #Depression  - continue with wellbutrin xL 300mg PO daily     #Fibromyalgia  - continue with duloxetine 60mg PO daily     #Misc   - DVT ppx: HSQ   - GI ppx: protonix   - Activity: IAT  - Diet: CLD, will switch back to CLD as pt did not tolerate advanced diet  - Dispo: acute, from home   - Code status: full code

## 2021-12-18 NOTE — PROGRESS NOTE ADULT - SUBJECTIVE AND OBJECTIVE BOX
MANDY THOMPSON 71y Female  MRN#: [id number]     Hospital Day: 4d    Pt is currently admitted with the primary diagnosis of  DECREASED ORAL INTAKE;INTRACTABLE NAUSEA AND VOMITING;WEAKNESS        SUBJECTIVE     Overnight events  None      Subjective complaints  pt was feeling nauseous and had a few episodes of diarrhea                                              ----------------------------------------------------------  OBJECTIVE  PAST MEDICAL & SURGICAL HISTORY  Pancreatitis, gallstone    Breast cancer  RT    Hypertension    Shingles rash    History of major abdominal surgery    History of seizures as a child    GERD (gastroesophageal reflux disease)     delivery delivered  x2    Abnormal cells in breast  RT breast bumpectomy                                              -----------------------------------------------------------  ALLERGIES:  penicillin (Unknown)                                            ------------------------------------------------------------    HOME MEDICATIONS  Home Medications:  amitriptyline 25 mg oral tablet: 1 tab(s) orally once a day (at bedtime) (07 Dec 2021 20:50)  DULoxetine 60 mg oral delayed release capsule: 1 cap(s) orally once a day (07 Dec 2021 20:50)  Femara 2.5 mg oral tablet: 1 tab(s) orally once a day (07 Dec 2021 20:50)  losartan 100 mg oral tablet: 1 tab(s) orally once a day (07 Dec 2021 20:50)  omeprazole 40 mg oral delayed release capsule: 1 cap(s) orally once a day (07 Dec 2021 20:50)  rosuvastatin 20 mg oral tablet: 1 tab(s) orally once a day (07 Dec 2021 20:50)  Wellbutrin  mg/24 hours oral tablet, extended release: 1 tab(s) orally every 24 hours (07 Dec 2021 20:50)  Xanax 0.25 mg oral tablet: 1 tab(s) orally once a day (at bedtime), As Needed (07 Dec 2021 20:51)                           MEDICATIONS:  STANDING MEDICATIONS  amitriptyline 25 milliGRAM(s) Oral at bedtime  atorvastatin 80 milliGRAM(s) Oral at bedtime  buPROPion XL (24-Hour) . 300 milliGRAM(s) Oral daily  dicyclomine 10 milliGRAM(s) Oral three times a day before meals  DULoxetine 60 milliGRAM(s) Oral daily  fluconAZOLE   Tablet 400 milliGRAM(s) Oral daily  heparin   Injectable 5000 Unit(s) SubCutaneous every 12 hours  letrozole 2.5 milliGRAM(s) Oral daily  losartan 100 milliGRAM(s) Oral daily  magnesium oxide 400 milliGRAM(s) Oral three times a day with meals  magnesium sulfate  IVPB 2 Gram(s) IV Intermittent every 2 hours  NIFEdipine XL 60 milliGRAM(s) Oral daily  pantoprazole  Injectable 40 milliGRAM(s) IV Push daily  simethicone 80 milliGRAM(s) Chew three times a day  sodium bicarbonate 325 milliGRAM(s) Oral three times a day    PRN MEDICATIONS  acetaminophen     Tablet .. 650 milliGRAM(s) Oral every 6 hours PRN  ALPRAZolam 0.5 milliGRAM(s) Oral at bedtime PRN  loperamide 2 milliGRAM(s) Oral every 8 hours PRN  metoclopramide Injectable 5 milliGRAM(s) IV Push every 6 hours PRN  ondansetron Injectable 4 milliGRAM(s) IV Push every 6 hours PRN                                            ------------------------------------------------------------  VITAL SIGNS: Last 24 Hours  T(C): 36.7 (18 Dec 2021 07:02), Max: 37.6 (17 Dec 2021 10:02)  T(F): 98 (18 Dec 2021 07:02), Max: 99.7 (17 Dec 2021 10:02)  HR: 98 (18 Dec 2021 07:02) (62 - 98)  BP: 117/80 (18 Dec 2021 07:02) (117/80 - 185/95)  BP(mean): --  RR: 18 (18 Dec 2021 07:02) (18 - 18)  SpO2: 98% (18 Dec 2021 07:02) (95% - 98%)                                             --------------------------------------------------------------  LABS:                        14.3   5.75  )-----------( 187      ( 18 Dec 2021 04:30 )             42.4     1218    142  |  104  |  4<L>  ----------------------------<  103<H>  4.1   |  19  |  0.6<L>    Ca    9.5      18 Dec 2021 04:30  Mg     1.5         TPro  6.9  /  Alb  4.3  /  TBili  2.4<H>  /  DBili  x   /  AST  13  /  ALT  10  /  AlkPhos  75  12-18                GI PCR Panel, Stool (collected 15 Dec 2021 17:54)  Source: .Stool Feces  Final Report (16 Dec 2021 04:53):    GI PCR Results: NOT detected    *******Please Note:*******    GI panel PCR evaluates for:    Campylobacter, Plesiomonas shigelloides, Salmonella,    Vibrio, Yersinia enterocolitica, Enteroaggregative    Escherichia coli (EAEC), Enteropathogenic E.coli (EPEC),    Enterotoxigenic E. coli (ETEC) lt/st, Shiga-like    toxin-producing E. coli (STEC) stx1/stx2,    Shigella/ Enteroinvasive E. coli (EIEC), Cryptosporidium,    Cyclospora cayetanensis, Entamoeba histolytica,    Giardia lamblia, Adenovirus F 40/41, Astrovirus,    Norovirus GI/GII, Rotavirus A, Sapovirus    Culture - Fungal, Blood (collected 15 Dec 2021 11:00)  Source: .Blood Blood-Peripheral  Preliminary Report (17 Dec 2021 07:46):    Testing in progress    Culture - Blood (collected 15 Dec 2021 11:00)  Source: .Blood Blood-Peripheral  Preliminary Report (16 Dec 2021 23:02):    No growth to date.                                                    -------------------------------------------------------------  RADIOLOGY:                                            --------------------------------------------------------------    PHYSICAL EXAM:  GENERAL: NAD, lying in bed comfortably  HEAD:  Atraumatic, Normocephalic  EYES: EOMI, PERRLA, conjunctiva and sclera clear  ENT: Moist mucous membranes  NECK: Supple, No JVD  CHEST/LUNG: Clear to auscultation bilaterally; No rales, rhonchi, wheezing, or rubs. Unlabored respirations  HEART: regular rate and rhythm; No murmurs, rubs, or gallops  ABDOMEN: Bowel sounds present; Soft, Nontender, Nondistended.    EXTREMITIES: + Peripheral Pulses, brisk capillary refill. No clubbing, cyanosis, or edema  NERVOUS SYSTEM:  Alert & Oriented X3, speech clear. No focal deficits   SKIN: No rashes or lesions                                           --------------------------------------------------------------

## 2021-12-18 NOTE — CONSULT NOTE ADULT - SUBJECTIVE AND OBJECTIVE BOX
pt with h/o IBS ,AND early stage breast cancer   admitted for sev esophageal candidiasis on diflucan  readmitted  for gi symptoms ,inability to eat and abd discomfort and nausea  daughter present at the bedside,  only on liquids not able to take much   vitals stable,no fever

## 2021-12-19 LAB
ALBUMIN SERPL ELPH-MCNC: 4.3 G/DL — SIGNIFICANT CHANGE UP (ref 3.5–5.2)
ALP SERPL-CCNC: 70 U/L — SIGNIFICANT CHANGE UP (ref 30–115)
ALT FLD-CCNC: 11 U/L — SIGNIFICANT CHANGE UP (ref 0–41)
ANION GAP SERPL CALC-SCNC: 19 MMOL/L — HIGH (ref 7–14)
AST SERPL-CCNC: 19 U/L — SIGNIFICANT CHANGE UP (ref 0–41)
BASOPHILS # BLD AUTO: 0.04 K/UL — SIGNIFICANT CHANGE UP (ref 0–0.2)
BASOPHILS NFR BLD AUTO: 0.8 % — SIGNIFICANT CHANGE UP (ref 0–1)
BILIRUB SERPL-MCNC: 0.8 MG/DL — SIGNIFICANT CHANGE UP (ref 0.2–1.2)
BUN SERPL-MCNC: 6 MG/DL — LOW (ref 10–20)
CALCIUM SERPL-MCNC: 9.9 MG/DL — SIGNIFICANT CHANGE UP (ref 8.5–10.1)
CHLORIDE SERPL-SCNC: 106 MMOL/L — SIGNIFICANT CHANGE UP (ref 98–110)
CO2 SERPL-SCNC: 19 MMOL/L — SIGNIFICANT CHANGE UP (ref 17–32)
CREAT SERPL-MCNC: 0.7 MG/DL — SIGNIFICANT CHANGE UP (ref 0.7–1.5)
EOSINOPHIL # BLD AUTO: 0.14 K/UL — SIGNIFICANT CHANGE UP (ref 0–0.7)
EOSINOPHIL NFR BLD AUTO: 2.8 % — SIGNIFICANT CHANGE UP (ref 0–8)
GLUCOSE SERPL-MCNC: 101 MG/DL — HIGH (ref 70–99)
HCT VFR BLD CALC: 43.2 % — SIGNIFICANT CHANGE UP (ref 37–47)
HGB BLD-MCNC: 14 G/DL — SIGNIFICANT CHANGE UP (ref 12–16)
IMM GRANULOCYTES NFR BLD AUTO: 2 % — HIGH (ref 0.1–0.3)
LYMPHOCYTES # BLD AUTO: 2.22 K/UL — SIGNIFICANT CHANGE UP (ref 1.2–3.4)
LYMPHOCYTES # BLD AUTO: 44.2 % — SIGNIFICANT CHANGE UP (ref 20.5–51.1)
MAGNESIUM SERPL-MCNC: 2.1 MG/DL — SIGNIFICANT CHANGE UP (ref 1.8–2.4)
MCHC RBC-ENTMCNC: 30.4 PG — SIGNIFICANT CHANGE UP (ref 27–31)
MCHC RBC-ENTMCNC: 32.4 G/DL — SIGNIFICANT CHANGE UP (ref 32–37)
MCV RBC AUTO: 93.7 FL — SIGNIFICANT CHANGE UP (ref 81–99)
MONOCYTES # BLD AUTO: 0.36 K/UL — SIGNIFICANT CHANGE UP (ref 0.1–0.6)
MONOCYTES NFR BLD AUTO: 7.2 % — SIGNIFICANT CHANGE UP (ref 1.7–9.3)
NEUTROPHILS # BLD AUTO: 2.16 K/UL — SIGNIFICANT CHANGE UP (ref 1.4–6.5)
NEUTROPHILS NFR BLD AUTO: 43 % — SIGNIFICANT CHANGE UP (ref 42.2–75.2)
NRBC # BLD: 0 /100 WBCS — SIGNIFICANT CHANGE UP (ref 0–0)
PLATELET # BLD AUTO: 273 K/UL — SIGNIFICANT CHANGE UP (ref 130–400)
POTASSIUM SERPL-MCNC: 4.5 MMOL/L — SIGNIFICANT CHANGE UP (ref 3.5–5)
POTASSIUM SERPL-SCNC: 4.5 MMOL/L — SIGNIFICANT CHANGE UP (ref 3.5–5)
PROT SERPL-MCNC: 6.9 G/DL — SIGNIFICANT CHANGE UP (ref 6–8)
RBC # BLD: 4.61 M/UL — SIGNIFICANT CHANGE UP (ref 4.2–5.4)
RBC # FLD: 14.3 % — SIGNIFICANT CHANGE UP (ref 11.5–14.5)
SODIUM SERPL-SCNC: 144 MMOL/L — SIGNIFICANT CHANGE UP (ref 135–146)
WBC # BLD: 5.02 K/UL — SIGNIFICANT CHANGE UP (ref 4.8–10.8)
WBC # FLD AUTO: 5.02 K/UL — SIGNIFICANT CHANGE UP (ref 4.8–10.8)

## 2021-12-19 PROCEDURE — 99232 SBSQ HOSP IP/OBS MODERATE 35: CPT

## 2021-12-19 RX ADMIN — LOSARTAN POTASSIUM 100 MILLIGRAM(S): 100 TABLET, FILM COATED ORAL at 08:40

## 2021-12-19 RX ADMIN — SIMETHICONE 80 MILLIGRAM(S): 80 TABLET, CHEWABLE ORAL at 21:47

## 2021-12-19 RX ADMIN — PANTOPRAZOLE SODIUM 40 MILLIGRAM(S): 20 TABLET, DELAYED RELEASE ORAL at 11:25

## 2021-12-19 RX ADMIN — FLUCONAZOLE 400 MILLIGRAM(S): 150 TABLET ORAL at 11:30

## 2021-12-19 RX ADMIN — Medication 25 MILLIGRAM(S): at 21:47

## 2021-12-19 RX ADMIN — HEPARIN SODIUM 5000 UNIT(S): 5000 INJECTION INTRAVENOUS; SUBCUTANEOUS at 08:42

## 2021-12-19 RX ADMIN — Medication 60 MILLIGRAM(S): at 08:42

## 2021-12-19 RX ADMIN — MAGNESIUM OXIDE 400 MG ORAL TABLET 400 MILLIGRAM(S): 241.3 TABLET ORAL at 11:33

## 2021-12-19 RX ADMIN — BUPROPION HYDROCHLORIDE 300 MILLIGRAM(S): 150 TABLET, EXTENDED RELEASE ORAL at 11:27

## 2021-12-19 RX ADMIN — HEPARIN SODIUM 5000 UNIT(S): 5000 INJECTION INTRAVENOUS; SUBCUTANEOUS at 16:47

## 2021-12-19 RX ADMIN — SIMETHICONE 80 MILLIGRAM(S): 80 TABLET, CHEWABLE ORAL at 08:41

## 2021-12-19 RX ADMIN — ATORVASTATIN CALCIUM 80 MILLIGRAM(S): 80 TABLET, FILM COATED ORAL at 21:47

## 2021-12-19 RX ADMIN — DULOXETINE HYDROCHLORIDE 60 MILLIGRAM(S): 30 CAPSULE, DELAYED RELEASE ORAL at 11:23

## 2021-12-19 RX ADMIN — Medication 325 MILLIGRAM(S): at 08:40

## 2021-12-19 RX ADMIN — Medication 10 MILLIGRAM(S): at 11:22

## 2021-12-19 RX ADMIN — MAGNESIUM OXIDE 400 MG ORAL TABLET 400 MILLIGRAM(S): 241.3 TABLET ORAL at 08:26

## 2021-12-19 RX ADMIN — Medication 325 MILLIGRAM(S): at 21:48

## 2021-12-19 RX ADMIN — MAGNESIUM OXIDE 400 MG ORAL TABLET 400 MILLIGRAM(S): 241.3 TABLET ORAL at 16:48

## 2021-12-19 RX ADMIN — LETROZOLE 2.5 MILLIGRAM(S): 2.5 TABLET, FILM COATED ORAL at 11:27

## 2021-12-19 RX ADMIN — Medication 325 MILLIGRAM(S): at 16:49

## 2021-12-19 RX ADMIN — Medication 0.5 MILLIGRAM(S): at 21:52

## 2021-12-19 RX ADMIN — Medication 10 MILLIGRAM(S): at 16:47

## 2021-12-19 RX ADMIN — Medication 10 MILLIGRAM(S): at 08:26

## 2021-12-19 RX ADMIN — SIMETHICONE 80 MILLIGRAM(S): 80 TABLET, CHEWABLE ORAL at 16:48

## 2021-12-19 NOTE — PROGRESS NOTE ADULT - ASSESSMENT
71y F hx ibs, ?gallstone pancreatitis remote, breast ca locally resected s/p RT on letrozole, recurrent shingles, partially treated esophageal candidiasis returns to hospital unable to tolerate po, with nausea/ diarrhea/ hypothermia/ recurrent leukopenia, dehydration, and lactic acidosis. suspect patient has symptomatic esophageal candidiasis and concomitant IBS. Also suspect patient's recurrent presentations/ hospitalizations suggestive of immunocompromise and frequency of GI infections might be explained by IgG deficiency.    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, in the setting of history of breast cancer and leukopenia   - EGD: 12/3 -> severe candida esophagitis (confirmed on biopsy), HP neg  c/w oral antifungal   try regular diet today as per patient request   - neutropenia and leukopenia   - Hem /Onc input appreciated - at present no indication to do bone marrow test to look for lymphoma or leukemia   send immunoglobulin levels IGG   to look for hypogammaglobinemia and need for IVIG, GI revaluation for repeat EGD    [] HTN   - c/w losartan 100mg, c/w Nifedipine ( was added recently )   []HLD  c/w statin     DVTppx, GI ppx       --Attending hand off--  Pending- regular diet trial started  , antifungal treatment, Ig testing  onc on board and asked for GI reevaluation for repeat EGD   Disposition- acute, home when better, recurrent readmission/ high risk

## 2021-12-19 NOTE — PROGRESS NOTE ADULT - SUBJECTIVE AND OBJECTIVE BOX
MANDY THOMPSON  71y, Female  Allergy: penicillin (Unknown)    Hospital Day: 5d    Patient seen and examined earlier today. she stated that she feels better and she wants to try to eat.     PMH/PSH:  PAST MEDICAL & SURGICAL HISTORY:  Pancreatitis, gallstone    Breast cancer  RT    Hypertension    Shingles rash    History of major abdominal surgery    History of seizures as a child    GERD (gastroesophageal reflux disease)     delivery delivered  x2    Abnormal cells in breast  RT breast bumpectomy        LAST 24-Hr EVENTS:    VITALS:  T(F): 98.4 (21 @ 13:04), Max: 98.4 (21 @ 13:04)  HR: 70 (21 @ 13:04)  BP: 102/60 (21 @ 13:04) (102/60 - 139/88)  RR: 18 (21 @ 13:04)  SpO2: 97% (21 @ 05:55)          TESTS & MEASUREMENTS:  Weight/BMI  82 (12-15-21 @ 00:55)  33.1 (12-15-21 @ 00:55)                          14.0   5.02  )-----------( 273      ( 19 Dec 2021 04:30 )             43.2             144  |  106  |  6<L>  ----------------------------<  101<H>  4.5   |  19  |  0.7    Ca    9.9      19 Dec 2021 04:30  Mg     2.1         TPro  6.9  /  Alb  4.3  /  TBili  0.8  /  DBili  x   /  AST  19  /  ALT  11  /  AlkPhos  70      LIVER FUNCTIONS - ( 19 Dec 2021 04:30 )  Alb: 4.3 g/dL / Pro: 6.9 g/dL / ALK PHOS: 70 U/L / ALT: 11 U/L / AST: 19 U/L / GGT: x                 GI PCR Panel, Stool (collected 12-15-21 @ 17:54)  Source: .Stool Feces  Final Report (21 @ 04:53):    GI PCR Results: NOT detected    *******Please Note:*******    GI panel PCR evaluates for:    Campylobacter, Plesiomonas shigelloides, Salmonella,    Vibrio, Yersinia enterocolitica, Enteroaggregative    Escherichia coli (EAEC), Enteropathogenic E.coli (EPEC),    Enterotoxigenic E. coli (ETEC) lt/st, Shiga-like    toxin-producing E. coli (STEC) stx1/stx2,    Shigella/ Enteroinvasive E. coli (EIEC), Cryptosporidium,    Cyclospora cayetanensis, Entamoeba histolytica,    Giardia lamblia, Adenovirus F 40/41, Astrovirus,    Norovirus GI/GII, Rotavirus A, Sapovirus    Culture - Fungal, Blood (collected 12-15-21 @ 11:00)  Source: .Blood Blood-Peripheral  Preliminary Report (21 @ 07:46):    Testing in progress    Culture - Blood (collected 12-15-21 @ 11:00)  Source: .Blood Blood-Peripheral  Preliminary Report (21 @ 23:02):    No growth to date.    Culture - Urine (collected 21 @ 15:16)  Source: Clean Catch Clean Catch (Midstream)  Final Report (21 @ 10:17):    50,000 - 99,000 CFU/mL Enterococcus faecalis  Organism: Enterococcus faecalis (21 @ 10:17)  Organism: Enterococcus faecalis (21 @ 10:17)      -  Ampicillin: S <=2 Predicts results to ampicillin/sulbactam, amoxacillin-clavulanate and  piperacillin-tazobactam.      -  Ciprofloxacin: R >2      -  Levofloxacin: R >4      -  Nitrofurantoin: S <=32 Should not be used to treat pyelonephritis.      -  Tetra/Doxy: R >8      -  Vancomycin: S 2      Method Type: LARRY        Procalcitonin, Serum: 7.94 ng/mL (21 @ 11:00)  Procalcitonin, Serum: 11.00 ng/mL (21 @ 04:30)          COVID-19 PCR: NotDetec (21 @ 20:13)                RADIOLOGY, ECG, & ADDITIONAL TESTS:  12 Lead ECG:   Ventricular Rate 52 BPM    Atrial Rate 52 BPM    P-R Interval 152 ms    QRS Duration 142 ms    Q-T Interval 484 ms    QTC Calculation(Bazett) 450 ms    P Axis 48 degrees    R Axis 20 degrees    T Axis 38 degrees    Diagnosis Line Sinus bradycardia  Right bundle branch block  Abnormal ECG    Confirmed by Neil Watts ([id number]) on 2021 3:29:18 PM (21 @ 09:12)    CT Head No Cont:   ACC: [id number] EXAM:  CT BRAIN                          PROCEDURE DATE:  2021          INTERPRETATION:  CLINICAL INDICATION: Nausea and vomiting.    Technique: CT of the head was performed without contrast.    Multiple contiguous axial images were acquired from the skullbase to the   vertex without the administration of intravenous contrast.  Coronal and   sagittal reformations were made.    COMPARISON: MRI brain 10/20/2021.    FINDINGS:    Redemonstration of left middle cranial fossa arachnoid cyst better   assessed on prior MRI. Localized mass effect upon the left temporal lobe.    No hydrocephalus.    No acute intracranial hemorrhage or midline shift.. Gray-white   differentiation is grossly maintained.    Intact calvarium. Mild opacification of the sphenoid sinus.    IMPRESSION:    No acute intracranial hemorrhage or midline shift.    Redemonstration of left middle cranial fossa arachnoid cyst as seen on   prior MRI.    --- End of Report ---          ENRIQUETA DU MD; ResidentRadiologist  This document has been electronically signed.  ASHIA ABREU MD; Attending Radiologist  This document has been electronically signed. Dec 14 2021  7:16PM (21 @ 17:20)    RECENT DIAGNOSTIC ORDERS:  Diet, Regular:   Soft and Bite Sized (SOFTBTSZ) (21 @ 11:18)  Immunoglobulins Panel: 16:00 (21 @ 11:18)  IgG Subsets: 16:00 (21 @ 11:18)      MEDICATIONS:  MEDICATIONS  (STANDING):  amitriptyline 25 milliGRAM(s) Oral at bedtime  atorvastatin 80 milliGRAM(s) Oral at bedtime  buPROPion XL (24-Hour) . 300 milliGRAM(s) Oral daily  dicyclomine 10 milliGRAM(s) Oral three times a day before meals  DULoxetine 60 milliGRAM(s) Oral daily  fluconAZOLE   Tablet 400 milliGRAM(s) Oral daily  heparin   Injectable 5000 Unit(s) SubCutaneous every 12 hours  letrozole 2.5 milliGRAM(s) Oral daily  losartan 100 milliGRAM(s) Oral daily  magnesium oxide 400 milliGRAM(s) Oral three times a day with meals  NIFEdipine XL 60 milliGRAM(s) Oral daily  pantoprazole  Injectable 40 milliGRAM(s) IV Push daily  simethicone 80 milliGRAM(s) Chew three times a day  sodium bicarbonate 325 milliGRAM(s) Oral three times a day    MEDICATIONS  (PRN):  acetaminophen     Tablet .. 650 milliGRAM(s) Oral every 6 hours PRN Temp greater or equal to 38C (100.4F), Mild Pain (1 - 3)  ALPRAZolam 0.5 milliGRAM(s) Oral at bedtime PRN anxiety  loperamide 2 milliGRAM(s) Oral every 8 hours PRN Diarrhea  ondansetron Injectable 4 milliGRAM(s) IV Push every 6 hours PRN Nausea and/or Vomiting  ondansetron Injectable 4 milliGRAM(s) IV Push every 6 hours PRN Nausea and/or Vomiting  traMADol 25 milliGRAM(s) Oral every 6 hours PRN Severe Pain (7 - 10)      HOME MEDICATIONS:  amitriptyline 25 mg oral tablet ()  Diflucan 200 mg oral tablet ()  DULoxetine 60 mg oral delayed release capsule ()  Femara 2.5 mg oral tablet ()  GlycoLax oral powder for reconstitution ()  hydrALAZINE 25 mg oral tablet ()  labetalol 100 mg oral tablet ()  losartan 100 mg oral tablet ()  omeprazole 40 mg oral delayed release capsule ()  rosuvastatin 20 mg oral tablet ()  valACYclovir 1 g oral tablet (12-10)  Wellbutrin  mg/24 hours oral tablet, extended release ()  Xanax 0.25 mg oral tablet ()      PHYSICAL EXAM:  GENERAL: alert, oriented, not in acute distress   CHEST/LUNG: clear to auscultations B/L    HEART: regular rhythm   ABDOMEN: soft,  non tender   EXTREMITIES:  no edema

## 2021-12-20 ENCOUNTER — TRANSCRIPTION ENCOUNTER (OUTPATIENT)
Age: 71
End: 2021-12-20

## 2021-12-20 VITALS
HEART RATE: 77 BPM | DIASTOLIC BLOOD PRESSURE: 65 MMHG | SYSTOLIC BLOOD PRESSURE: 110 MMHG | TEMPERATURE: 97 F | RESPIRATION RATE: 18 BRPM

## 2021-12-20 LAB
ALBUMIN SERPL ELPH-MCNC: 4.3 G/DL — SIGNIFICANT CHANGE UP (ref 3.5–5.2)
ALP SERPL-CCNC: 70 U/L — SIGNIFICANT CHANGE UP (ref 30–115)
ALT FLD-CCNC: 12 U/L — SIGNIFICANT CHANGE UP (ref 0–41)
ANION GAP SERPL CALC-SCNC: 17 MMOL/L — HIGH (ref 7–14)
AST SERPL-CCNC: 15 U/L — SIGNIFICANT CHANGE UP (ref 0–41)
BASOPHILS # BLD AUTO: 0.05 K/UL — SIGNIFICANT CHANGE UP (ref 0–0.2)
BASOPHILS NFR BLD AUTO: 1 % — SIGNIFICANT CHANGE UP (ref 0–1)
BILIRUB SERPL-MCNC: 0.6 MG/DL — SIGNIFICANT CHANGE UP (ref 0.2–1.2)
BUN SERPL-MCNC: 12 MG/DL — SIGNIFICANT CHANGE UP (ref 10–20)
CALCIUM SERPL-MCNC: 10.1 MG/DL — SIGNIFICANT CHANGE UP (ref 8.5–10.1)
CHLORIDE SERPL-SCNC: 102 MMOL/L — SIGNIFICANT CHANGE UP (ref 98–110)
CO2 SERPL-SCNC: 24 MMOL/L — SIGNIFICANT CHANGE UP (ref 17–32)
CREAT SERPL-MCNC: 0.7 MG/DL — SIGNIFICANT CHANGE UP (ref 0.7–1.5)
CULTURE RESULTS: SIGNIFICANT CHANGE UP
EOSINOPHIL # BLD AUTO: 0.12 K/UL — SIGNIFICANT CHANGE UP (ref 0–0.7)
EOSINOPHIL NFR BLD AUTO: 2.3 % — SIGNIFICANT CHANGE UP (ref 0–8)
GLUCOSE SERPL-MCNC: 92 MG/DL — SIGNIFICANT CHANGE UP (ref 70–99)
HCT VFR BLD CALC: 39.4 % — SIGNIFICANT CHANGE UP (ref 37–47)
HGB BLD-MCNC: 13 G/DL — SIGNIFICANT CHANGE UP (ref 12–16)
IGA FLD-MCNC: 140 MG/DL — SIGNIFICANT CHANGE UP (ref 84–499)
IGG FLD-MCNC: 603 MG/DL — LOW (ref 610–1660)
IGG4 SER-MCNC: 9 MG/DL — SIGNIFICANT CHANGE UP (ref 2–96)
IGM SERPL-MCNC: 138 MG/DL — SIGNIFICANT CHANGE UP (ref 35–242)
IMM GRANULOCYTES NFR BLD AUTO: 2.1 % — HIGH (ref 0.1–0.3)
KAPPA LC SER QL IFE: 1.15 MG/DL — SIGNIFICANT CHANGE UP (ref 0.33–1.94)
KAPPA/LAMBDA FREE LIGHT CHAIN RATIO, SERUM: 1.15 RATIO — SIGNIFICANT CHANGE UP (ref 0.26–1.65)
LAMBDA LC SER QL IFE: 1 MG/DL — SIGNIFICANT CHANGE UP (ref 0.57–2.63)
LYMPHOCYTES # BLD AUTO: 1.78 K/UL — SIGNIFICANT CHANGE UP (ref 1.2–3.4)
LYMPHOCYTES # BLD AUTO: 34.7 % — SIGNIFICANT CHANGE UP (ref 20.5–51.1)
MAGNESIUM SERPL-MCNC: 1.9 MG/DL — SIGNIFICANT CHANGE UP (ref 1.8–2.4)
MCHC RBC-ENTMCNC: 31.1 PG — HIGH (ref 27–31)
MCHC RBC-ENTMCNC: 33 G/DL — SIGNIFICANT CHANGE UP (ref 32–37)
MCV RBC AUTO: 94.3 FL — SIGNIFICANT CHANGE UP (ref 81–99)
MONOCYTES # BLD AUTO: 0.38 K/UL — SIGNIFICANT CHANGE UP (ref 0.1–0.6)
MONOCYTES NFR BLD AUTO: 7.4 % — SIGNIFICANT CHANGE UP (ref 1.7–9.3)
NEUTROPHILS # BLD AUTO: 2.69 K/UL — SIGNIFICANT CHANGE UP (ref 1.4–6.5)
NEUTROPHILS NFR BLD AUTO: 52.5 % — SIGNIFICANT CHANGE UP (ref 42.2–75.2)
NRBC # BLD: 0 /100 WBCS — SIGNIFICANT CHANGE UP (ref 0–0)
PLATELET # BLD AUTO: 263 K/UL — SIGNIFICANT CHANGE UP (ref 130–400)
POTASSIUM SERPL-MCNC: 4.1 MMOL/L — SIGNIFICANT CHANGE UP (ref 3.5–5)
POTASSIUM SERPL-SCNC: 4.1 MMOL/L — SIGNIFICANT CHANGE UP (ref 3.5–5)
PROT SERPL-MCNC: 6.5 G/DL — SIGNIFICANT CHANGE UP (ref 6–8)
RBC # BLD: 4.18 M/UL — LOW (ref 4.2–5.4)
RBC # FLD: 14.2 % — SIGNIFICANT CHANGE UP (ref 11.5–14.5)
SODIUM SERPL-SCNC: 143 MMOL/L — SIGNIFICANT CHANGE UP (ref 135–146)
SPECIMEN SOURCE: SIGNIFICANT CHANGE UP
WBC # BLD: 5.13 K/UL — SIGNIFICANT CHANGE UP (ref 4.8–10.8)
WBC # FLD AUTO: 5.13 K/UL — SIGNIFICANT CHANGE UP (ref 4.8–10.8)

## 2021-12-20 RX ORDER — NIFEDIPINE 30 MG
1 TABLET, EXTENDED RELEASE 24 HR ORAL
Qty: 30 | Refills: 0
Start: 2021-12-20 | End: 2022-01-18

## 2021-12-20 RX ORDER — VALACYCLOVIR 500 MG/1
1 TABLET, FILM COATED ORAL
Qty: 6 | Refills: 0
Start: 2021-12-20 | End: 2021-12-22

## 2021-12-20 RX ORDER — FLUCONAZOLE 150 MG/1
2 TABLET ORAL
Qty: 8 | Refills: 0
Start: 2021-12-20 | End: 2021-12-23

## 2021-12-20 RX ORDER — VALACYCLOVIR 500 MG/1
500 TABLET, FILM COATED ORAL
Refills: 0 | Status: DISCONTINUED | OUTPATIENT
Start: 2021-12-20 | End: 2021-12-20

## 2021-12-20 RX ADMIN — MAGNESIUM OXIDE 400 MG ORAL TABLET 400 MILLIGRAM(S): 241.3 TABLET ORAL at 15:15

## 2021-12-20 RX ADMIN — BUPROPION HYDROCHLORIDE 300 MILLIGRAM(S): 150 TABLET, EXTENDED RELEASE ORAL at 11:08

## 2021-12-20 RX ADMIN — HEPARIN SODIUM 5000 UNIT(S): 5000 INJECTION INTRAVENOUS; SUBCUTANEOUS at 06:20

## 2021-12-20 RX ADMIN — DULOXETINE HYDROCHLORIDE 60 MILLIGRAM(S): 30 CAPSULE, DELAYED RELEASE ORAL at 11:07

## 2021-12-20 RX ADMIN — MAGNESIUM OXIDE 400 MG ORAL TABLET 400 MILLIGRAM(S): 241.3 TABLET ORAL at 11:07

## 2021-12-20 RX ADMIN — Medication 10 MILLIGRAM(S): at 06:19

## 2021-12-20 RX ADMIN — Medication 10 MILLIGRAM(S): at 11:08

## 2021-12-20 RX ADMIN — SIMETHICONE 80 MILLIGRAM(S): 80 TABLET, CHEWABLE ORAL at 06:20

## 2021-12-20 RX ADMIN — LOSARTAN POTASSIUM 100 MILLIGRAM(S): 100 TABLET, FILM COATED ORAL at 06:19

## 2021-12-20 RX ADMIN — PANTOPRAZOLE SODIUM 40 MILLIGRAM(S): 20 TABLET, DELAYED RELEASE ORAL at 11:08

## 2021-12-20 RX ADMIN — LETROZOLE 2.5 MILLIGRAM(S): 2.5 TABLET, FILM COATED ORAL at 11:07

## 2021-12-20 RX ADMIN — Medication 325 MILLIGRAM(S): at 06:17

## 2021-12-20 RX ADMIN — Medication 60 MILLIGRAM(S): at 06:19

## 2021-12-20 RX ADMIN — FLUCONAZOLE 400 MILLIGRAM(S): 150 TABLET ORAL at 11:09

## 2021-12-20 NOTE — PHYSICAL THERAPY INITIAL EVALUATION ADULT - GENERAL OBSERVATIONS, REHAB EVAL
Pt encountered in the bathroom, NAD, agreeable for b/s PT, c/o feeling weak. Zonia. well to tx. Pt left seated in the b/s chair post tx all needs within reach. Pt is independent with functional mobility at this time no further PT interventions needed.

## 2021-12-20 NOTE — PHYSICAL THERAPY INITIAL EVALUATION ADULT - PERTINENT HX OF CURRENT PROBLEM, REHAB EVAL
71 year old female with PMH of IBS, pancreatitis, Breast Ca s/p radiation on Letrozole presents for persistent nausea and vomiting. HPI starts on 12/9 when patient was sent to the hospital by  for  severe александр esophagitis. Patient's symptoms started after she returned from [place] on 11/14. She had many bouts of n/v with the absence of BMs.

## 2021-12-20 NOTE — PROGRESS NOTE ADULT - ASSESSMENT
Patient is a 71 year old female with PMH of IBS, pancreatitis, Breast Ca s/p radiation on Letrozole presents for persistent nausea and vomiting. HPI starts on 12/9 when patient was sent to the hospital by  for  severe александр esophagitis. Patient's symptoms started after she returned from [place] on 11/14. She had many bouts of n/v with the absence of BMs.   In the ED, /77, HR 88, RR 20, SpO2 99% on RA, afebrile. CT Abdomen and Pelvis with IV contrast showed no acute intra-abdominal pathology.  Labs showed leukopenia and lactate3.8. Patient received 2L of IVF and anti-emetics in ED. Admitted to medicine for further management of candida esophagitis.     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  - EGD: 12/3 -> severe candida esophagitis (confirmed on biopsy), HP neg  - d/c on PO zofran and PO diflucan but patient with recurrent N/V unable to tolerate PO   -  iv antifungals switched to oral  - HIV non reactive   - Hep C negative  - hep B negative  - Blood cultures (12/9): no growth   - Urine cultures (12/9): no growth  - Stool studies (12/9) :  no growth  - Stool pcr negative, cdiff negative  - switch to p/o antifungal  - d/c valacyclovir  - Lactate 3.8 downtrending to 0.8  - MRSA PCR Result.: Negative(12.09.21)  - GI recc apreciated  - ID recc appreciated  - blood cx negative 12/15  - procal 11-->7.9 today  - F/u fungal cx  - F/u Ig panel    #Breast Cancer  #Leukopenia  - neutropenia and leukopenia   - heme/onc eval (follows with Dr. Guzman)   - Called Dr. Tang ([phone]) for Heme Onc follow up, said they will visit her, will f/u with heme/onc  - WBC improved temporarily on recent admission due to Neupogen x1  - CT chest noted    #HTN   - c/w losartan 100mg  - Bp runs high, added Nifedipine yesterday    #HLD  - continue with atorvastatin while inpatient     #Depression  - continue with wellbutrin xL 300mg PO daily     #Fibromyalgia  - continue with duloxetine 60mg PO daily     #Misc   - DVT ppx: HSQ   - GI ppx: protonix   - Activity: IAT  - Diet: CLD, will switch back to CLD as pt did not tolerate advanced diet  - Dispo: acute, from home   - Code status: full code     Patient is a 71 year old female with PMH of IBS, pancreatitis, Breast Ca s/p radiation on Letrozole presents for persistent nausea and vomiting. HPI starts on 12/9 when patient was sent to the hospital by  for  severe александр esophagitis. Patient's symptoms started after she returned from [place] on 11/14. She had many bouts of n/v with the absence of BMs.   In the ED, /77, HR 88, RR 20, SpO2 99% on RA, afebrile. CT Abdomen and Pelvis with IV contrast showed no acute intra-abdominal pathology.  Labs showed leukopenia and lactate3.8. Patient received 2L of IVF and anti-emetics in ED. Admitted to medicine for further management of candida esophagitis.     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  - EGD: 12/3 -> severe candida esophagitis (confirmed on biopsy), HP neg  - d/c on PO zofran and PO diflucan but patient with recurrent N/V unable to tolerate PO   -  iv antifungals switched to oral  - HIV non reactive   - Hep C negative  - hep B negative  - Blood cultures (12/9): no growth   - Urine cultures (12/9): no growth  - Stool studies (12/9) :  no growth  - Stool pcr negative, cdiff negative  - switch to p/o antifungal  - Lactate 3.8 downtrending to 0.8  - MRSA PCR Result.: Negative(12.09.21)  - GI recc apreciated: OP follow up, no need for endoscopy at this time  - ID recc appreciated: c/w fiflucan for 21 day course  - blood cx negative 12/15  - procal 11-->7.9 today  - Ig panel shows borderline low IgG    #Breast Cancer  #Leukopenia  - neutropenia and leukopenia   - heme/onc eval (follows with Dr. Guzman)   - Dr. Tang ([phone]) for Heme  - WBC improved temporarily on recent admission due to Neupogen x1  - CT chest noted    #HTN   - c/w losartan 100mg  - Bp runs high, added Nifedipine yesterday    #HLD  - continue with atorvastatin while inpatient     #Depression  - continue with wellbutrin xL 300mg PO daily     #Fibromyalgia  - continue with duloxetine 60mg PO daily     #Misc   - DVT ppx: HSQ   - GI ppx: protonix   - Activity: IAT  - Diet: full diet  dispo: [facility]

## 2021-12-20 NOTE — PROGRESS NOTE ADULT - ASSESSMENT
cbc,cmp revoewed wnl   cultures negative   pt has stage 1 breast cancer on letrazole after lumpectomty and RT  no evedince of recuurence   no leukopenia wbc wnl   bone marrow not indicated now   pt needs to follow with GI AND ID as outpt   will follow out pt also  discussed with medical resident     gabriela sethi MD

## 2021-12-20 NOTE — DISCHARGE NOTE PROVIDER - NSDCMRMEDTOKEN_GEN_ALL_CORE_FT
amitriptyline 25 mg oral tablet: 1 tab(s) orally once a day (at bedtime)  DULoxetine 60 mg oral delayed release capsule: 1 cap(s) orally once a day  Femara 2.5 mg oral tablet: 1 tab(s) orally once a day  fluconazole 200 mg oral tablet: 2 tab(s) orally once a day until 12/24/2021  losartan 100 mg oral tablet: 1 tab(s) orally once a day  NIFEdipine 60 mg oral tablet, extended release: 1 tab(s) orally once a day  omeprazole 40 mg oral delayed release capsule: 1 cap(s) orally once a day  rosuvastatin 20 mg oral tablet: 1 tab(s) orally once a day  valACYclovir 500 mg oral tablet: 1 tab(s) orally 2 times a day for 3 days  Wellbutrin  mg/24 hours oral tablet, extended release: 1 tab(s) orally every 24 hours  Xanax 0.25 mg oral tablet: 1 tab(s) orally once a day (at bedtime), As Needed

## 2021-12-20 NOTE — DISCHARGE NOTE NURSING/CASE MANAGEMENT/SOCIAL WORK - PATIENT PORTAL LINK FT
You can access the FollowMyHealth Patient Portal offered by [facility] by registering at the following website: [email or web address]. By joining [facility]’s FollowMyHealth portal, you will also be able to view your health information using other applications (apps) compatible with our system.

## 2021-12-20 NOTE — DISCHARGE NOTE NURSING/CASE MANAGEMENT/SOCIAL WORK - NSDCPEFALRISK_GEN_ALL_CORE
For information on Fall & Injury Prevention, visit: [email or web address] OR  [email or web address] OR  https://www.cdc.gov/steadi/patient.html

## 2021-12-20 NOTE — PROGRESS NOTE ADULT - SUBJECTIVE AND OBJECTIVE BOX
MANDY THOMPSON 71y Female  MRN#: [id number]   Hospital Day: 6d    SUBJECTIVE  Patient is a 71y old Female who presents with a chief complaint of N/V (19 Dec 2021 17:42)  Currently admitted to medicine with the primary diagnosis of Decreased oral intake      INTERVAL HPI AND OVERNIGHT EVENTS:  Patient was examined and seen at bedside. This morning she is resting comfortably in bed. No issues or overnight events.    OBJECTIVE  PAST MEDICAL & SURGICAL HISTORY  Pancreatitis, gallstone    Breast cancer  RT    Hypertension    Shingles rash    History of major abdominal surgery    History of seizures as a child    GERD (gastroesophageal reflux disease)     delivery delivered  x2    Abnormal cells in breast  RT breast bumpectomy      ALLERGIES:  penicillin (Unknown)    MEDICATIONS:  STANDING MEDICATIONS  amitriptyline 25 milliGRAM(s) Oral at bedtime  atorvastatin 80 milliGRAM(s) Oral at bedtime  buPROPion XL (24-Hour) . 300 milliGRAM(s) Oral daily  dicyclomine 10 milliGRAM(s) Oral three times a day before meals  DULoxetine 60 milliGRAM(s) Oral daily  fluconAZOLE   Tablet 400 milliGRAM(s) Oral daily  heparin   Injectable 5000 Unit(s) SubCutaneous every 12 hours  letrozole 2.5 milliGRAM(s) Oral daily  losartan 100 milliGRAM(s) Oral daily  magnesium oxide 400 milliGRAM(s) Oral three times a day with meals  NIFEdipine XL 60 milliGRAM(s) Oral daily  pantoprazole  Injectable 40 milliGRAM(s) IV Push daily  simethicone 80 milliGRAM(s) Chew three times a day  sodium bicarbonate 325 milliGRAM(s) Oral three times a day    PRN MEDICATIONS  acetaminophen     Tablet .. 650 milliGRAM(s) Oral every 6 hours PRN  ALPRAZolam 0.5 milliGRAM(s) Oral at bedtime PRN  loperamide 2 milliGRAM(s) Oral every 8 hours PRN  ondansetron Injectable 4 milliGRAM(s) IV Push every 6 hours PRN  ondansetron Injectable 4 milliGRAM(s) IV Push every 6 hours PRN  traMADol 25 milliGRAM(s) Oral every 6 hours PRN      VITAL SIGNS: Last 24 Hours  T(C): 37.5 (20 Dec 2021 04:27), Max: 37.5 (20 Dec 2021 04:27)  T(F): 99.5 (20 Dec 2021 04:27), Max: 99.5 (20 Dec 2021 04:27)  HR: 74 (20 Dec 2021 04:27) (70 - 78)  BP: 123/77 (20 Dec 2021 04:27) (102/60 - 139/88)  BP(mean): 77 (19 Dec 2021 13:04) (77 - 77)  RR: 18 (20 Dec 2021 04:27) (18 - 18)  SpO2: --    LABS:                        14.0   5.02  )-----------( 273      ( 19 Dec 2021 04:30 )             43.2         144  |  106  |  6<L>  ----------------------------<  101<H>  4.5   |  19  |  0.7    Ca    9.9      19 Dec 2021 04:30  Mg     2.1         TPro  6.9  /  Alb  4.3  /  TBili  0.8  /  DBili  x   /  AST  19  /  ALT  11  /  AlkPhos  70                    RADIOLOGY:      PHYSICAL EXAM:  CONSTITUTIONAL: No acute distress, well-developed, well-groomed, AAOx3  HEAD: Atraumatic, normocephalic  EYES: EOM intact, PERRLA, conjunctiva and sclera clear  ENT: Supple, no masses, no thyromegaly, no bruits, no JVD; moist mucous membranes  PULMONARY: Clear to auscultation bilaterally; no wheezes, rales, or rhonchi  CARDIOVASCULAR: Regular rate and rhythm; no murmurs, rubs, or gallops  GASTROINTESTINAL: Soft, non-tender, non-distended; bowel sounds present  MUSCULOSKELETAL: 2+ peripheral pulses; no clubbing, no cyanosis, no edema  NEUROLOGY: non-focal  SKIN: No rashes or lesions; warm and dry   MANDY THOMPSON 71y Female  MRN#: [id number]   Hospital Day: 6d    SUBJECTIVE  Patient is a 71y old Female who presents with a chief complaint of N/V (19 Dec 2021 17:42)  Currently admitted to medicine with the primary diagnosis of Decreased oral intake      INTERVAL HPI AND OVERNIGHT EVENTS:  Patient was examined and seen at bedside. This morning she is resting comfortably in bed. No issues or overnight events.    OBJECTIVE  PAST MEDICAL & SURGICAL HISTORY  Pancreatitis, gallstone    Breast cancer  RT    Hypertension    Shingles rash    History of major abdominal surgery    History of seizures as a child    GERD (gastroesophageal reflux disease)     delivery delivered  x2    Abnormal cells in breast  RT breast bumpectomy      ALLERGIES:  penicillin (Unknown)    MEDICATIONS:  STANDING MEDICATIONS  amitriptyline 25 milliGRAM(s) Oral at bedtime  atorvastatin 80 milliGRAM(s) Oral at bedtime  buPROPion XL (24-Hour) . 300 milliGRAM(s) Oral daily  dicyclomine 10 milliGRAM(s) Oral three times a day before meals  DULoxetine 60 milliGRAM(s) Oral daily  fluconAZOLE   Tablet 400 milliGRAM(s) Oral daily  heparin   Injectable 5000 Unit(s) SubCutaneous every 12 hours  letrozole 2.5 milliGRAM(s) Oral daily  losartan 100 milliGRAM(s) Oral daily  magnesium oxide 400 milliGRAM(s) Oral three times a day with meals  NIFEdipine XL 60 milliGRAM(s) Oral daily  pantoprazole  Injectable 40 milliGRAM(s) IV Push daily  simethicone 80 milliGRAM(s) Chew three times a day  sodium bicarbonate 325 milliGRAM(s) Oral three times a day    PRN MEDICATIONS  acetaminophen     Tablet .. 650 milliGRAM(s) Oral every 6 hours PRN  ALPRAZolam 0.5 milliGRAM(s) Oral at bedtime PRN  loperamide 2 milliGRAM(s) Oral every 8 hours PRN  ondansetron Injectable 4 milliGRAM(s) IV Push every 6 hours PRN  ondansetron Injectable 4 milliGRAM(s) IV Push every 6 hours PRN  traMADol 25 milliGRAM(s) Oral every 6 hours PRN      VITAL SIGNS: Last 24 Hours  T(C): 37.5 (20 Dec 2021 04:27), Max: 37.5 (20 Dec 2021 04:27)  T(F): 99.5 (20 Dec 2021 04:27), Max: 99.5 (20 Dec 2021 04:27)  HR: 74 (20 Dec 2021 04:27) (70 - 78)  BP: 123/77 (20 Dec 2021 04:27) (102/60 - 139/88)  BP(mean): 77 (19 Dec 2021 13:04) (77 - 77)  RR: 18 (20 Dec 2021 04:27) (18 - 18)  SpO2: --    LABS:                        14.0   5.02  )-----------( 273      ( 19 Dec 2021 04:30 )             43.2         144  |  106  |  6<L>  ----------------------------<  101<H>  4.5   |  19  |  0.7    Ca    9.9      19 Dec 2021 04:30  Mg     2.1         TPro  6.9  /  Alb  4.3  /  TBili  0.8  /  DBili  x   /  AST  19  /  ALT  11  /  AlkPhos  70                      RADIOLOGY:      PHYSICAL EXAM:  CONSTITUTIONAL: No acute distress, well-developed, well-groomed, AAOx3  HEAD: Atraumatic, normocephalic  EYES: EOM intact, PERRLA, conjunctiva and sclera clear  ENT: Supple, no masses, no thyromegaly, no bruits, no JVD; moist mucous membranes  PULMONARY: Clear to auscultation bilaterally; no wheezes, rales, or rhonchi  CARDIOVASCULAR: Regular rate and rhythm; no murmurs, rubs, or gallops  GASTROINTESTINAL: Soft, non-tender, non-distended; bowel sounds present  MUSCULOSKELETAL: 2+ peripheral pulses; no clubbing, no cyanosis, no edema  NEUROLOGY: non-focal  SKIN: No rashes or lesions; warm and dry

## 2021-12-20 NOTE — DISCHARGE NOTE PROVIDER - HOSPITAL COURSE
Patient is a 71 year old female with PMH of IBS, pancreatitis, Breast Ca s/p radiation on Letrozole presents for persistent nausea and vomiting. HPI starts on 12/9 when patient was sent to the hospital by  for  severe александр esophagitis. Patient's symptoms started after she returned from [place] on 11/14. She had many bouts of n/v with the absence of BMs. Patient came to the ED 11/26 for abdominal distension and inability to have a BM, CT AP did not show evidence of bowel obstruction or pneumoperitoneum at that time. Patient had recent EGD with Dr. Babin on 12/3/21 which showed esophageal candidiasis and was started on PO diflucan. Patient was not responding to PO antifungals so she was sent in for further work up on 12/9. 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 Patients nausea was treated with zofran, and she was discharged with rx of PO zofran. During this admission patient was also found to be neutropenic, she was evaluated by hem onc and given Neupogen. Since discharge on 12/12/21, patient has had worsening nausea and vomiting unable to tolerated PO.    In the ED, /77, HR 88, RR 20, SpO2 99% on RA, afebrile. CT Abdomen and Pelvis with IV contrast showed no acute intra-abdominal pathology.  Labs showed leukopenia and lactate3.8. Patient received 2L of IVF and anti-emetics in ED. Admitted to medicine for further management of candida esophagitis.     Suspect patient has symptomatic esophageal candidiasis and concomitant IBS. Suspicion that immunocompromise and frequency of GI infections might be explained by IgG deficiency, however, IGG only borderline low (603). Patient now able to tolerate full PO diet. C/w antifungal to complete course, ID team increased dose from 200mg to 400mg daily.  Heme Onc consult brought on board (Dr. Tang) and after borderline IGG results recommended OP followup with ID and Gi OP. Also of note, BP regimen changed inpatient. Continue with current management of losartan 100mg once daily and nifedipine 60mg once daily and follow up with PCP. Patient found to have active vesicular lesions in R gluteal region consistent with recurrent HSV, sent home on valtrex.     Workup included:   - HIV non reactive   - Hep C negative  - hep B negative  - Blood cultures (12/9): no growth   - Urine cultures (12/9): no growth  - Stool studies (12/9) :  no growth  - Stool pcr negative, cdiff negative  - switched to p/o antifungal for total of 21 days (started on 12/3)  - Lactate 3.8 downtrending to 0.8  - MRSA PCR Result.: Negative(12.09.21)  - GI recc apreciated: OP follow up, no need for endoscopy at this time  - ID recc appreciated: c/w diflucan for 21 day course at 400mg  - blood cx negative 12/15  - procal downtrending 11-->7.9  - Ig panel shows borderline low IgG

## 2021-12-20 NOTE — DISCHARGE NOTE PROVIDER - PROVIDER TOKENS
PROVIDER:[TOKEN:[[id number]],FOLLOWUP:[1 week]],PROVIDER:[TOKEN:[[id number]],FOLLOWUP:[2 weeks]],PROVIDER:[TOKEN:[[id number]],FOLLOWUP:[2 weeks]]

## 2021-12-20 NOTE — DISCHARGE NOTE PROVIDER - NSDCCPCAREPLAN_GEN_ALL_CORE_FT
PRINCIPAL DISCHARGE DIAGNOSIS  Diagnosis: Candida esophagitis  Assessment and Plan of Treatment: You were found to have candida esophagitis. We tested your blood to rule out any other infection which was unremarkable. We checked your immunoglobulin levels to see if a deficiency could be causing your symptoms, but it was borderline line normal. your dyshpagia improved and you were able to tolerate PO food.s Continue to take your antifungal meds to complete course of 21 days (started 12/3). Follow up with GI and INfectious disease.

## 2021-12-20 NOTE — DISCHARGE NOTE PROVIDER - CARE PROVIDER_API CALL
Daron Page (DO)  Infectious Disease; Internal Medicine  [address] [place] [address]  Phone: [phone]  Fax: [phone]  Follow Up Time: 1 week    Lorrie Babin)  Gastroenterology  [address] [place] [id number]  Phone: [phone]  Fax: [phone]  Follow Up Time: 2 weeks    [facility] [address] [address]  Phone: [phone]  Fax: [phone]  Follow Up Time: 2 weeks

## 2021-12-20 NOTE — PROGRESS NOTE ADULT - PROVIDER SPECIALTY LIST ADULT
Internal Medicine
Heme/Onc
Hospitalist
Internal Medicine
Infectious Disease
Internal Medicine

## 2021-12-20 NOTE — DISCHARGE NOTE PROVIDER - CARE PROVIDERS DIRECT ADDRESSES
,DirectAddress_Unknown,[email or web address],DirectAddress_Unknown

## 2021-12-20 NOTE — DISCHARGE NOTE PROVIDER - ATTENDING DISCHARGE PHYSICAL EXAMINATION:
The patient seen and examined before discharge, she stated that she tolerated food well, she denies any symptoms except that she did not have BM for 2 days as she was not eating good. I explained with her that she needs to continue the antifungal treatnment and that she needs to follow with ID, GI and hematology as outpatient    o/e she is alert, oriented    lungs clear to auscultations   heart regular rhythm   abd soft, , ext no edema

## 2021-12-21 LAB
KAPPA LC SER QL IFE: 1.25 MG/DL — SIGNIFICANT CHANGE UP (ref 0.33–1.94)
KAPPA/LAMBDA FREE LIGHT CHAIN RATIO, SERUM: 1.07 RATIO — SIGNIFICANT CHANGE UP (ref 0.26–1.65)
LAMBDA LC SER QL IFE: 1.17 MG/DL — SIGNIFICANT CHANGE UP (ref 0.57–2.63)

## 2021-12-22 LAB
IGA FLD-MCNC: 187 MG/DL — SIGNIFICANT CHANGE UP (ref 84–499)
IGG FLD-MCNC: 1082 MG/DL — SIGNIFICANT CHANGE UP (ref 610–1660)
IGM SERPL-MCNC: 101 MG/DL — SIGNIFICANT CHANGE UP (ref 35–242)

## 2021-12-27 ENCOUNTER — INPATIENT (INPATIENT)
Facility: HOSPITAL | Age: 71
LOS: 5 days | Discharge: HOME | End: 2022-01-02
Attending: INTERNAL MEDICINE | Admitting: INTERNAL MEDICINE
Payer: MEDICARE

## 2021-12-27 VITALS
DIASTOLIC BLOOD PRESSURE: 92 MMHG | OXYGEN SATURATION: 99 % | HEIGHT: 62 IN | TEMPERATURE: 99 F | RESPIRATION RATE: 22 BRPM | SYSTOLIC BLOOD PRESSURE: 143 MMHG | HEART RATE: 122 BPM | WEIGHT: 179.9 LBS

## 2021-12-27 DIAGNOSIS — N64.59 OTHER SIGNS AND SYMPTOMS IN BREAST: Chronic | ICD-10-CM

## 2021-12-27 LAB
ALBUMIN SERPL ELPH-MCNC: 4.5 G/DL — SIGNIFICANT CHANGE UP (ref 3.5–5.2)
ALP SERPL-CCNC: 82 U/L — SIGNIFICANT CHANGE UP (ref 30–115)
ALT FLD-CCNC: 18 U/L — SIGNIFICANT CHANGE UP (ref 0–41)
ANION GAP SERPL CALC-SCNC: 24 MMOL/L — HIGH (ref 7–14)
ANISOCYTOSIS BLD QL: SLIGHT — SIGNIFICANT CHANGE UP
AST SERPL-CCNC: 24 U/L — SIGNIFICANT CHANGE UP (ref 0–41)
BASOPHILS # BLD AUTO: 0.08 K/UL — SIGNIFICANT CHANGE UP (ref 0–0.2)
BASOPHILS NFR BLD AUTO: 5.2 % — HIGH (ref 0–1)
BILIRUB SERPL-MCNC: 0.4 MG/DL — SIGNIFICANT CHANGE UP (ref 0.2–1.2)
BUN SERPL-MCNC: 17 MG/DL — SIGNIFICANT CHANGE UP (ref 10–20)
CALCIUM SERPL-MCNC: 9.8 MG/DL — SIGNIFICANT CHANGE UP (ref 8.5–10.1)
CHLORIDE SERPL-SCNC: 100 MMOL/L — SIGNIFICANT CHANGE UP (ref 98–110)
CO2 SERPL-SCNC: 13 MMOL/L — LOW (ref 17–32)
CREAT SERPL-MCNC: 0.9 MG/DL — SIGNIFICANT CHANGE UP (ref 0.7–1.5)
EOSINOPHIL # BLD AUTO: 0.13 K/UL — SIGNIFICANT CHANGE UP (ref 0–0.7)
EOSINOPHIL NFR BLD AUTO: 8.7 % — HIGH (ref 0–8)
GIANT PLATELETS BLD QL SMEAR: PRESENT — SIGNIFICANT CHANGE UP
GLUCOSE SERPL-MCNC: 100 MG/DL — HIGH (ref 70–99)
HCT VFR BLD CALC: 42 % — SIGNIFICANT CHANGE UP (ref 37–47)
HGB BLD-MCNC: 14.2 G/DL — SIGNIFICANT CHANGE UP (ref 12–16)
LACTATE SERPL-SCNC: 4.2 MMOL/L — CRITICAL HIGH (ref 0.7–2)
LIDOCAIN IGE QN: 49 U/L — SIGNIFICANT CHANGE UP (ref 7–60)
LYMPHOCYTES # BLD AUTO: 0.63 K/UL — LOW (ref 1.2–3.4)
LYMPHOCYTES # BLD AUTO: 41.7 % — SIGNIFICANT CHANGE UP (ref 20.5–51.1)
MANUAL SMEAR VERIFICATION: SIGNIFICANT CHANGE UP
MCHC RBC-ENTMCNC: 30.5 PG — SIGNIFICANT CHANGE UP (ref 27–31)
MCHC RBC-ENTMCNC: 33.8 G/DL — SIGNIFICANT CHANGE UP (ref 32–37)
MCV RBC AUTO: 90.1 FL — SIGNIFICANT CHANGE UP (ref 81–99)
METAMYELOCYTES # FLD: 8.7 % — HIGH (ref 0–0)
MICROCYTES BLD QL: SLIGHT — SIGNIFICANT CHANGE UP
MONOCYTES # BLD AUTO: 0.17 K/UL — SIGNIFICANT CHANGE UP (ref 0.1–0.6)
MONOCYTES NFR BLD AUTO: 11.3 % — HIGH (ref 1.7–9.3)
MYELOCYTES NFR BLD: 5.2 % — HIGH (ref 0–0)
NEUTROPHILS # BLD AUTO: 0.28 K/UL — LOW (ref 1.4–6.5)
NEUTROPHILS NFR BLD AUTO: 13.9 % — LOW (ref 42.2–75.2)
NEUTS BAND # BLD: 4.4 % — SIGNIFICANT CHANGE UP (ref 0–6)
PLAT MORPH BLD: ABNORMAL
PLATELET # BLD AUTO: 300 K/UL — SIGNIFICANT CHANGE UP (ref 130–400)
POLYCHROMASIA BLD QL SMEAR: SLIGHT — SIGNIFICANT CHANGE UP
POTASSIUM SERPL-MCNC: 3.5 MMOL/L — SIGNIFICANT CHANGE UP (ref 3.5–5)
POTASSIUM SERPL-SCNC: 3.5 MMOL/L — SIGNIFICANT CHANGE UP (ref 3.5–5)
PROMYELOCYTES # FLD: 0.9 % — HIGH (ref 0–0)
PROT SERPL-MCNC: 6.8 G/DL — SIGNIFICANT CHANGE UP (ref 6–8)
RBC # BLD: 4.66 M/UL — SIGNIFICANT CHANGE UP (ref 4.2–5.4)
RBC # FLD: 14.5 % — SIGNIFICANT CHANGE UP (ref 11.5–14.5)
RBC BLD AUTO: ABNORMAL
SMUDGE CELLS # BLD: PRESENT — SIGNIFICANT CHANGE UP
SODIUM SERPL-SCNC: 137 MMOL/L — SIGNIFICANT CHANGE UP (ref 135–146)
WBC # BLD: 1.52 K/UL — LOW (ref 4.8–10.8)
WBC # FLD AUTO: 1.52 K/UL — LOW (ref 4.8–10.8)

## 2021-12-27 PROCEDURE — 99285 EMERGENCY DEPT VISIT HI MDM: CPT

## 2021-12-27 RX ORDER — FAMOTIDINE 10 MG/ML
20 INJECTION INTRAVENOUS ONCE
Refills: 0 | Status: COMPLETED | OUTPATIENT
Start: 2021-12-27 | End: 2021-12-27

## 2021-12-27 RX ORDER — SODIUM CHLORIDE 9 MG/ML
1000 INJECTION, SOLUTION INTRAVENOUS ONCE
Refills: 0 | Status: COMPLETED | OUTPATIENT
Start: 2021-12-27 | End: 2021-12-27

## 2021-12-27 RX ORDER — SODIUM CHLORIDE 9 MG/ML
1000 INJECTION INTRAMUSCULAR; INTRAVENOUS; SUBCUTANEOUS ONCE
Refills: 0 | Status: COMPLETED | OUTPATIENT
Start: 2021-12-27 | End: 2021-12-27

## 2021-12-27 RX ORDER — METOCLOPRAMIDE HCL 10 MG
10 TABLET ORAL ONCE
Refills: 0 | Status: COMPLETED | OUTPATIENT
Start: 2021-12-27 | End: 2021-12-27

## 2021-12-27 RX ORDER — ONDANSETRON 8 MG/1
4 TABLET, FILM COATED ORAL ONCE
Refills: 0 | Status: COMPLETED | OUTPATIENT
Start: 2021-12-27 | End: 2021-12-27

## 2021-12-27 RX ADMIN — SODIUM CHLORIDE 1000 MILLILITER(S): 9 INJECTION INTRAMUSCULAR; INTRAVENOUS; SUBCUTANEOUS at 21:53

## 2021-12-27 RX ADMIN — Medication 10 MILLIGRAM(S): at 23:21

## 2021-12-27 RX ADMIN — SODIUM CHLORIDE 1000 MILLILITER(S): 9 INJECTION, SOLUTION INTRAVENOUS at 23:21

## 2021-12-27 RX ADMIN — FAMOTIDINE 20 MILLIGRAM(S): 10 INJECTION INTRAVENOUS at 21:54

## 2021-12-27 RX ADMIN — ONDANSETRON 4 MILLIGRAM(S): 8 TABLET, FILM COATED ORAL at 21:55

## 2021-12-27 NOTE — ED ADULT NURSE NOTE - OBJECTIVE STATEMENT
70y/o female with daughter at bedside presents with reported intractable N&V for 3 days, as per patients daughter patient has frequent bouts of uncontrolled nausea and vomiting since recent return from [place] requiring multiple hospitalizations.  no further complaints at this time

## 2021-12-27 NOTE — ED ADULT TRIAGE NOTE - CHIEF COMPLAINT QUOTE
pt BIBA for vomiting & diarrhea x 1 month. pt recently dx w/ neutropenia. pt symptoms began 1 month ago after returning from [place], pt treated with diflucan and finished course. pt has hx of cdiff and recently on antibiotics

## 2021-12-28 LAB
ALBUMIN SERPL ELPH-MCNC: 4.4 G/DL — SIGNIFICANT CHANGE UP (ref 3.5–5.2)
ALP SERPL-CCNC: 76 U/L — SIGNIFICANT CHANGE UP (ref 30–115)
ALT FLD-CCNC: 17 U/L — SIGNIFICANT CHANGE UP (ref 0–41)
ANION GAP SERPL CALC-SCNC: 17 MMOL/L — HIGH (ref 7–14)
AST SERPL-CCNC: 19 U/L — SIGNIFICANT CHANGE UP (ref 0–41)
BASE EXCESS BLDV CALC-SCNC: -1.8 MMOL/L — SIGNIFICANT CHANGE UP (ref -2–3)
BASOPHILS # BLD AUTO: 0.04 K/UL — SIGNIFICANT CHANGE UP (ref 0–0.2)
BASOPHILS NFR BLD AUTO: 1.1 % — HIGH (ref 0–1)
BILIRUB SERPL-MCNC: 0.5 MG/DL — SIGNIFICANT CHANGE UP (ref 0.2–1.2)
BUN SERPL-MCNC: 12 MG/DL — SIGNIFICANT CHANGE UP (ref 10–20)
CA-I SERPL-SCNC: 1.2 MMOL/L — SIGNIFICANT CHANGE UP (ref 1.15–1.33)
CALCIUM SERPL-MCNC: 9.3 MG/DL — SIGNIFICANT CHANGE UP (ref 8.5–10.1)
CHLORIDE SERPL-SCNC: 104 MMOL/L — SIGNIFICANT CHANGE UP (ref 98–110)
CO2 SERPL-SCNC: 18 MMOL/L — SIGNIFICANT CHANGE UP (ref 17–32)
CREAT SERPL-MCNC: 0.9 MG/DL — SIGNIFICANT CHANGE UP (ref 0.7–1.5)
EOSINOPHIL # BLD AUTO: 0.1 K/UL — SIGNIFICANT CHANGE UP (ref 0–0.7)
EOSINOPHIL NFR BLD AUTO: 2.8 % — SIGNIFICANT CHANGE UP (ref 0–8)
GAS PNL BLDV: 135 MMOL/L — LOW (ref 136–145)
GAS PNL BLDV: SIGNIFICANT CHANGE UP
GLUCOSE SERPL-MCNC: 111 MG/DL — HIGH (ref 70–99)
HCO3 BLDV-SCNC: 23 MMOL/L — SIGNIFICANT CHANGE UP (ref 22–29)
HCT VFR BLD CALC: 41.2 % — SIGNIFICANT CHANGE UP (ref 37–47)
HCT VFR BLDA CALC: 41 % — SIGNIFICANT CHANGE UP (ref 34.5–46.5)
HGB BLD CALC-MCNC: 13.7 G/DL — SIGNIFICANT CHANGE UP (ref 11.7–16.1)
HGB BLD-MCNC: 13.4 G/DL — SIGNIFICANT CHANGE UP (ref 12–16)
IMM GRANULOCYTES NFR BLD AUTO: 4.5 % — HIGH (ref 0.1–0.3)
LACTATE BLDV-MCNC: 1.9 MMOL/L — SIGNIFICANT CHANGE UP (ref 0.5–2)
LACTATE SERPL-SCNC: 1.1 MMOL/L — SIGNIFICANT CHANGE UP (ref 0.7–2)
LYMPHOCYTES # BLD AUTO: 1.33 K/UL — SIGNIFICANT CHANGE UP (ref 1.2–3.4)
LYMPHOCYTES # BLD AUTO: 37.2 % — SIGNIFICANT CHANGE UP (ref 20.5–51.1)
MAGNESIUM SERPL-MCNC: 1.8 MG/DL — SIGNIFICANT CHANGE UP (ref 1.8–2.4)
MCHC RBC-ENTMCNC: 30.8 PG — SIGNIFICANT CHANGE UP (ref 27–31)
MCHC RBC-ENTMCNC: 32.5 G/DL — SIGNIFICANT CHANGE UP (ref 32–37)
MCV RBC AUTO: 94.7 FL — SIGNIFICANT CHANGE UP (ref 81–99)
MONOCYTES # BLD AUTO: 0.46 K/UL — SIGNIFICANT CHANGE UP (ref 0.1–0.6)
MONOCYTES NFR BLD AUTO: 12.8 % — HIGH (ref 1.7–9.3)
NEUTROPHILS # BLD AUTO: 1.49 K/UL — SIGNIFICANT CHANGE UP (ref 1.4–6.5)
NEUTROPHILS NFR BLD AUTO: 41.6 % — LOW (ref 42.2–75.2)
NRBC # BLD: 0 /100 WBCS — SIGNIFICANT CHANGE UP (ref 0–0)
PCO2 BLDV: 39 MMHG — SIGNIFICANT CHANGE UP (ref 39–42)
PH BLDV: 7.38 — SIGNIFICANT CHANGE UP (ref 7.32–7.43)
PLATELET # BLD AUTO: 390 K/UL — SIGNIFICANT CHANGE UP (ref 130–400)
PO2 BLDV: 44 MMHG — SIGNIFICANT CHANGE UP
POTASSIUM BLDV-SCNC: 2.9 MMOL/L — CRITICAL LOW (ref 3.5–5.1)
POTASSIUM SERPL-MCNC: 4.3 MMOL/L — SIGNIFICANT CHANGE UP (ref 3.5–5)
POTASSIUM SERPL-SCNC: 4.3 MMOL/L — SIGNIFICANT CHANGE UP (ref 3.5–5)
PROT SERPL-MCNC: 6.6 G/DL — SIGNIFICANT CHANGE UP (ref 6–8)
RBC # BLD: 4.35 M/UL — SIGNIFICANT CHANGE UP (ref 4.2–5.4)
RBC # FLD: 14.6 % — HIGH (ref 11.5–14.5)
SAO2 % BLDV: 74.9 % — SIGNIFICANT CHANGE UP
SARS-COV-2 RNA SPEC QL NAA+PROBE: SIGNIFICANT CHANGE UP
SODIUM SERPL-SCNC: 139 MMOL/L — SIGNIFICANT CHANGE UP (ref 135–146)
WBC # BLD: 3.58 K/UL — LOW (ref 4.8–10.8)
WBC # FLD AUTO: 3.58 K/UL — LOW (ref 4.8–10.8)

## 2021-12-28 PROCEDURE — 99223 1ST HOSP IP/OBS HIGH 75: CPT

## 2021-12-28 RX ORDER — ALPRAZOLAM 0.25 MG
0.25 TABLET ORAL AT BEDTIME
Refills: 0 | Status: DISCONTINUED | OUTPATIENT
Start: 2021-12-28 | End: 2022-01-02

## 2021-12-28 RX ORDER — LOSARTAN POTASSIUM 100 MG/1
100 TABLET, FILM COATED ORAL DAILY
Refills: 0 | Status: DISCONTINUED | OUTPATIENT
Start: 2021-12-28 | End: 2022-01-02

## 2021-12-28 RX ORDER — SUCRALFATE 1 G
1 TABLET ORAL
Refills: 0 | Status: DISCONTINUED | OUTPATIENT
Start: 2021-12-28 | End: 2021-12-30

## 2021-12-28 RX ORDER — NIFEDIPINE 30 MG
60 TABLET, EXTENDED RELEASE 24 HR ORAL DAILY
Refills: 0 | Status: DISCONTINUED | OUTPATIENT
Start: 2021-12-28 | End: 2022-01-02

## 2021-12-28 RX ORDER — SODIUM CHLORIDE 9 MG/ML
1000 INJECTION, SOLUTION INTRAVENOUS
Refills: 0 | Status: DISCONTINUED | OUTPATIENT
Start: 2021-12-28 | End: 2021-12-30

## 2021-12-28 RX ORDER — AMITRIPTYLINE HCL 25 MG
25 TABLET ORAL AT BEDTIME
Refills: 0 | Status: DISCONTINUED | OUTPATIENT
Start: 2021-12-28 | End: 2022-01-02

## 2021-12-28 RX ORDER — ACETAMINOPHEN 500 MG
650 TABLET ORAL EVERY 6 HOURS
Refills: 0 | Status: DISCONTINUED | OUTPATIENT
Start: 2021-12-28 | End: 2022-01-02

## 2021-12-28 RX ORDER — BUPROPION HYDROCHLORIDE 150 MG/1
300 TABLET, EXTENDED RELEASE ORAL DAILY
Refills: 0 | Status: DISCONTINUED | OUTPATIENT
Start: 2021-12-28 | End: 2022-01-02

## 2021-12-28 RX ORDER — POTASSIUM CHLORIDE 20 MEQ
40 PACKET (EA) ORAL
Refills: 0 | Status: COMPLETED | OUTPATIENT
Start: 2021-12-28 | End: 2021-12-28

## 2021-12-28 RX ORDER — ENOXAPARIN SODIUM 100 MG/ML
40 INJECTION SUBCUTANEOUS DAILY
Refills: 0 | Status: DISCONTINUED | OUTPATIENT
Start: 2021-12-28 | End: 2022-01-02

## 2021-12-28 RX ORDER — LETROZOLE 2.5 MG/1
2.5 TABLET, FILM COATED ORAL DAILY
Refills: 0 | Status: DISCONTINUED | OUTPATIENT
Start: 2021-12-28 | End: 2022-01-02

## 2021-12-28 RX ORDER — ONDANSETRON 8 MG/1
4 TABLET, FILM COATED ORAL EVERY 6 HOURS
Refills: 0 | Status: DISCONTINUED | OUTPATIENT
Start: 2021-12-28 | End: 2022-01-02

## 2021-12-28 RX ORDER — ATORVASTATIN CALCIUM 80 MG/1
80 TABLET, FILM COATED ORAL AT BEDTIME
Refills: 0 | Status: DISCONTINUED | OUTPATIENT
Start: 2021-12-28 | End: 2022-01-02

## 2021-12-28 RX ORDER — DULOXETINE HYDROCHLORIDE 30 MG/1
60 CAPSULE, DELAYED RELEASE ORAL DAILY
Refills: 0 | Status: DISCONTINUED | OUTPATIENT
Start: 2021-12-28 | End: 2022-01-02

## 2021-12-28 RX ORDER — PANTOPRAZOLE SODIUM 20 MG/1
40 TABLET, DELAYED RELEASE ORAL
Refills: 0 | Status: DISCONTINUED | OUTPATIENT
Start: 2021-12-28 | End: 2022-01-02

## 2021-12-28 RX ADMIN — Medication 650 MILLIGRAM(S): at 11:53

## 2021-12-28 RX ADMIN — ENOXAPARIN SODIUM 40 MILLIGRAM(S): 100 INJECTION SUBCUTANEOUS at 11:45

## 2021-12-28 RX ADMIN — LETROZOLE 2.5 MILLIGRAM(S): 2.5 TABLET, FILM COATED ORAL at 11:44

## 2021-12-28 RX ADMIN — BUPROPION HYDROCHLORIDE 300 MILLIGRAM(S): 150 TABLET, EXTENDED RELEASE ORAL at 11:44

## 2021-12-28 RX ADMIN — Medication 1 GRAM(S): at 22:04

## 2021-12-28 RX ADMIN — Medication 650 MILLIGRAM(S): at 19:53

## 2021-12-28 RX ADMIN — PANTOPRAZOLE SODIUM 40 MILLIGRAM(S): 20 TABLET, DELAYED RELEASE ORAL at 06:42

## 2021-12-28 RX ADMIN — Medication 60 MILLIGRAM(S): at 06:42

## 2021-12-28 RX ADMIN — Medication 40 MILLIEQUIVALENT(S): at 04:31

## 2021-12-28 RX ADMIN — Medication 1 GRAM(S): at 11:49

## 2021-12-28 RX ADMIN — Medication 1 GRAM(S): at 17:19

## 2021-12-28 RX ADMIN — LOSARTAN POTASSIUM 100 MILLIGRAM(S): 100 TABLET, FILM COATED ORAL at 06:42

## 2021-12-28 RX ADMIN — Medication 0.25 MILLIGRAM(S): at 21:46

## 2021-12-28 RX ADMIN — ATORVASTATIN CALCIUM 80 MILLIGRAM(S): 80 TABLET, FILM COATED ORAL at 21:46

## 2021-12-28 RX ADMIN — Medication 25 MILLIGRAM(S): at 21:46

## 2021-12-28 RX ADMIN — Medication 40 MILLIEQUIVALENT(S): at 04:32

## 2021-12-28 RX ADMIN — DULOXETINE HYDROCHLORIDE 60 MILLIGRAM(S): 30 CAPSULE, DELAYED RELEASE ORAL at 11:45

## 2021-12-28 RX ADMIN — Medication 40 MILLIEQUIVALENT(S): at 06:43

## 2021-12-28 NOTE — H&P ADULT - ATTENDING COMMENTS
Pt seen and examined independently today. History confirmed with the pt and as above. EMR reviewed  She presents with episodes of diarrhea and vomiting (food, occasionally bilious, no bleeding) along with nausea.  +SOB at times. No fever, recent use of abx or travel or abdominal pain. + chills.   She was recently hospitalized at [facility] for same symptoms and had extensive workup which was negative (pt with results on her phone) - stool studies, flow cytometry, HIV, Hepatitis, EBV, varicella.  She feels better today and is tolerating clear liquid diet.    T(F): 99.1 (12-28-21 @ 08:27), Max: 99.4 (12-27-21 @ 20:55)  HR: 72 (12-28-21 @ 08:27) (72 - 122)  BP: 113/71 (12-28-21 @ 08:27) (113/71 - 143/92)  RR: 18 (12-28-21 @ 08:27) (18 - 22)  SpO2: 97% (12-28-21 @ 08:27) (95% - 99%) on RA    PHYSICAL EXAM:  GENERAL: NAD  HEAD:  Normocephalic  EYES:  conjunctiva and sclera clear  ENMT: Moist mucous membranes  NERVOUS SYSTEM:  Alert, awake, Good concentration  CHEST/LUNG: CTA b/l; No rales, rhonchi, wheezing  HEART: Regular rate and rhythm; No murmurs  ABDOMEN: Soft, Nontender, Nondistended; Bowel sounds present  EXTREMITIES:   No edema    LABS:                        13.4   3.58  )-----------( 390      ( 28 Dec 2021 06:32 )             41.2     12-28    139  |  104  |  12  ----------------------------<  111<H>  4.3   |  18  |  0.9    Ca    9.3      28 Dec 2021 06:32  Mg     1.8     12-28    TPro  6.6  /  Alb  4.4  /  TBili  0.5  /  DBili  x   /  AST  19  /  ALT  17  /  AlkPhos  76  12-28    < from: CT Abdomen and Pelvis w/ IV Cont (12.14.21 @ 17:21) >    IMPRESSION:    No evidence of acute abdominal or pelvic mass or inflammatory process    < end of copied text >      Episodes of nausea, vomiting and diarrhea in pt with IBS  - pt had recent extensive workup at [facility] that was negative for infectious causes or malignancy  (note: BM biopsy not done)  - IVF, clear liquid diet and advance as tolerated  - GI eval  - f/u pending studies    Neutropenia  - now resolved  - HemOnc consulted - pt had neutropenia last admission that also resolved  - BM not done at that time    Lactic acidosis resolved    HAGMA - improved    HTN, hyperlipidemia, depression, fibromyalgia, h/o breast cancer - continue current management    DVT prophylaxis - lovenox      PROGRESS NOTE HANDOFF    Pending: GI     pt informed of the plan of care    Disposition: home

## 2021-12-28 NOTE — ED PROVIDER NOTE - CLINICAL SUMMARY MEDICAL DECISION MAKING FREE TEXT BOX
admit for neutropenia. no acute findings on CT. pt afebrile. will hold on empiric abx. will admit for symptom control, monitoring

## 2021-12-28 NOTE — ED PROVIDER NOTE - OBJECTIVE STATEMENT
71 year old female with pmhx of ibs, pancreatitis, breast CA s/p radiation on letrozole, presents with v/d x 1 month. Pt admits symptoms began after trip from [place] on 11/14. Pt has been having intermittent episodes of vomiting and diarrhea. Pt was seen in ED, with normal ct scans of abdomen, and stool cultures. Pt had endoscopy performed by Dr Babin , which showed esophageal candidiasis, which she was treated with antifungals. Pt noted to be neutropenic in past, and is supposed to follow up with dr wilson. Pt denies abd pain, fever, chills, or urinary symptoms. no chest pain or sob.

## 2021-12-28 NOTE — H&P ADULT - NSHPPHYSICALEXAM_GEN_ALL_CORE
CONSTITUTIONAL: Well groomed, no apparent distress  EYES: PERRLA and symmetric, EOMI, No conjunctival or scleral injection, non-icteric  RESPIRATORY: No respiratory distress, no use of accessory muscles; CTA b/l, no wheezes, rales or rhonchi, no dullness or hyperresonance to percussion, no tactile fremitus, no subcutaneous emphysema  CARDIOVASCULAR: RRRR, +S1S2, no murmurs, no rubs, no gallops; no JVD; no peripheral edema  	Vascular: no carotid bruits; no abdominal bruit; carotid pulse palpable, radial pulse palpable, femoral pulse palpable, dorsalis pedis pulse palpable, posterior tibialis pulse palpable  GASTROINTESTINAL: Soft, non tender, non distended, no rebound, no guarding; No palpable masses; no hepatosplenomegaly; no hernia palpated;  	Rectal: normal sphincter tone and no masses palpated; stool negative for blood  MUSCULOSKELETAL: Normal gait and station; no digital clubbing or cyanosis; examination of the (head/neck, spine/ribs/pelvis, RUE, LUE, RLE, LLE) without misalignment, normal range of motion without pain, no spinal tenderness, normal muscle strength/tone    SKIN: No rashes or ulcers noted; no subcutaneous nodules or induration palpable    NEUROLOGIC: CN II-XII intact; normal reflexes in upper and lower extremities, sensation intact in upper and lower extremities b/l to light touch; Babinski down b/l; no Kernig’s sign, no Brudzinski’s sign    PSYCHIATRIC: Appropriate insight/judgment; A+O x 3, mood and affect appropriate, recent/remote memory intact GENERAL: NAD, speaks in full sentences, no signs of respiratory distress  HEAD:  Atraumatic, Normocephalic  EYES: EOMI, PERRLA, anicteric sclera  NECK: Supple, No JVD  CHEST/LUNG: Clear to auscultation bilaterally; No wheeze; No crackles; No accessory muscles used  HEART: Regular rate and rhythm; No murmurs;   ABDOMEN: Soft, Nontender, Nondistended; Bowel sounds present; No guarding  EXTREMITIES:  2+ Peripheral Pulses, No cyanosis or edema  PSYCH: AAOx3  NEUROLOGY: non-focal  SKIN: No rashes or lesions GENERAL: NAD, speaks in full sentences, no signs of respiratory distress  HEAD:  Atraumatic, Normocephalic  EYES: EOMI, PERRLA  NECK: Supple  CHEST/LUNG: Clear to auscultation bilaterally; No wheeze; No crackles; No accessory muscles used  HEART: Regular rate and rhythm; No murmurs;   ABDOMEN: Soft, Nontender, Nondistended; Bowel sounds present; No guarding  EXTREMITIES:  2+ Peripheral Pulses, No cyanosis or edema  PSYCH: AAOx3  NEUROLOGY: non-focal  SKIN: No rashes or lesions

## 2021-12-28 NOTE — H&P ADULT - HISTORY OF PRESENT ILLNESS
Patient is a 71 year old female with PMH of IBS, pancreatitis, Breast Ca s/p radiation on Letrozole presents for vomiting and diarrhea x 1 month. Pt admits symptoms began after trip from [place] on 11/14. Pt has been having intermittent episodes of vomiting and diarrhea. Pt was seen in ED, with normal ct scans of abdomen, and stool cultures. Pt had endoscopy performed by Dr Babin , which showed esophageal candidiasis, which she was treated with antifungals. Pt noted to be neutropenic in past, and is supposed to follow up with dr wilson. Pt denies abd pain, fever, chills, or urinary symptoms. no chest pain or sob.    In the ED, she recieved 2L boluses and pepcid 20mg and reglan IV. Labs are remarkable for WBC 1.52 with , Bicarb 13, Lactate 4.2, repeat lactate s/p fluids 1.9 and K in VBG 2.9. K 40 meEq x3 doses were ordered.     Vital Signs Last 24 Hrs  T(C): 37.3 (28 Dec 2021 00:03), Max: 37.4 (27 Dec 2021 20:55)  T(F): 99.2 (28 Dec 2021 00:03), Max: 99.4 (27 Dec 2021 20:55)  HR: 89 (28 Dec 2021 00:03) (89 - 122)  BP: 115/60 (28 Dec 2021 00:03) (115/60 - 143/92)  BP(mean): --  ABP: --  ABP(mean): --  RR: 20 (28 Dec 2021 00:03) (20 - 22)  SpO2: 95% (28 Dec 2021 00:03) (95% - 99%)     Patient is a 71 year old female with PMH of IBS, pancreatitis, Breast Ca s/p radiation on Letrozole presents for vomiting and diarrhea x 1 month. Pt admits symptoms began after trip from [place] on 11/14. Pt has been having intermittent episodes of vomiting and diarrhea. Pt was seen in ED, with normal ct scans of abdomen, and stool cultures. Pt had endoscopy performed by Dr Babin , which showed esophageal candidiasis, which she was treated with antifungals. Pt noted to be neutropenic in past, and is supposed to follow up with dr wilson. Pt denies abd pain, fever, chills, or urinary symptoms. no chest pain or sob. Reports this is the 6th time this has happened to her but denies any triggering factors.     In the ED, she recieved 2L boluses and pepcid 20mg and reglan IV. Labs are remarkable for WBC 1.52 with , Bicarb 13, Lactate 4.2, repeat lactate s/p fluids 1.9 and K in VBG 2.9. K 40 meEq x3 doses were ordered.     Vital Signs Last 24 Hrs  T(C): 37.3 (28 Dec 2021 00:03), Max: 37.4 (27 Dec 2021 20:55)  T(F): 99.2 (28 Dec 2021 00:03), Max: 99.4 (27 Dec 2021 20:55)  HR: 89 (28 Dec 2021 00:03) (89 - 122)  BP: 115/60 (28 Dec 2021 00:03) (115/60 - 143/92)  BP(mean): --  ABP: --  ABP(mean): --  RR: 20 (28 Dec 2021 00:03) (20 - 22)  SpO2: 95% (28 Dec 2021 00:03) (95% - 99%)

## 2021-12-28 NOTE — H&P ADULT - ASSESSMENT
Patient is a 71 year old female with PMH of IBS, pancreatitis, Breast Ca s/p radiation on Letrozole presents for vomiting and diarrhea x 1 month.     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  - EGD: 12/3 -> severe candida esophagitis (confirmed on biopsy), HP neg  - d/c on PO zofran and PO diflucan but patient with recurrent N/V unable to tolerate PO   -  iv antifungals switched to oral  - HIV non reactive   - Hep C negative  - hep B negative  - Blood cultures (12/9): no growth   - Urine cultures (12/9): no growth  - Stool studies (12/9) :  no growth  - Stool pcr negative, cdiff negative  - switch to p/o antifungal  - Lactate 3.8 downtrending to 0.8  - MRSA PCR Result.: Negative(12.09.21)  - GI recc apreciated: OP follow up, no need for endoscopy at this time  - ID recc appreciated: c/w fiflucan for 21 day course  - blood cx negative 12/15  - procal 11-->7.9 today  - Ig panel shows borderline low IgG    #Breast Cancer  #Leukopenia  - neutropenia and leukopenia   - heme/onc eval (follows with Dr. Guzman)   - Dr. Tang ([phone]) for Heme  - WBC improved temporarily on recent admission due to Neupogen x1  - CT chest noted    #HTN   - c/w losartan 100mg  - Bp runs high, added Nifedipine yesterday    #HLD  - continue with atorvastatin while inpatient     #Depression  - continue with wellbutrin xL 300mg PO daily     #Fibromyalgia  - continue with duloxetine 60mg PO daily     #Misc   - DVT ppx: HSQ   - GI ppx: protonix   - Activity: IAT  - Diet: full diet  dispo: DC home   Patient is a 71 year old female with PMH of IBS, pancreatitis, Breast Ca s/p radiation on Letrozole presents for vomiting and diarrhea x 1 month.     #Diarrhea possibly 2/2 IBS  #HAGMA likely secondary to lactic acidosis and hypokalemia 2/2 diarrhea  #Possible underlying IgG Deficiency  - recently admitted for similar diagnosis  - GI work up all negative, Stool pcr negative, cdiff negative in previous admission  - Seen By GI inpatient and recommended OP f/u. no need for EGD inpatient  - monitor signs for diarrhea  - replete electrolytes prn, keep K>4 and Mg>2  - S/p 2L bolus in ED  - C/w LR at 75 cc/hr  - Start clear liquid diet for now  - zofran for nausea  - send Cdiff and GI pcr  - If work up all negative, consider imodium for diarrhea    #Severe candida esophagitis   - EGD: 12/3 -> severe candida esophagitis (confirmed on biopsy), HP neg  - d/c on PO zofran and PO diflucan but patient with recurrent N/V unable to tolerate PO   -  iv antifungals switched to oral  - HIV non reactive   - Hep C negative  - hep B negative  - switch to p/o antifungal  - GI recc apreciated: OP follow up, no need for endoscopy at this time  - ID recc appreciated: c/w fiflucan for 21 day course  - Ig panel shows borderline low IgG    #Breast Cancer  #Leukopenia  - neutropenia and leukopenia   - heme/onc eval for severe neuropenia () (follows with Dr. Guzman)   - Dr. Tang ([phone]) for Heme  - WBC improved temporarily on recent admission due to Neupogen x1  -    #HTN   - c/w losartan 100mg and procardia    #HLD  - continue with atorvastatin while inpatient     #Depression  - continue with wellbutrin xL 300mg PO daily     #Fibromyalgia  - continue with duloxetine 60mg PO daily     #Misc   - DVT ppx: HSQ   - GI ppx: protonix   - Diet: clears  dispo: acute   Patient is a 71 year old female with PMH of IBS, pancreatitis, Breast Ca s/p radiation on Letrozole presents for vomiting and diarrhea x 1 month.     #Diarrhea possibly 2/2 IBS  #HAGMA likely secondary to lactic acidosis and hypokalemia 2/2 diarrhea  #Possible underlying IgG Deficiency  - recently admitted for similar diagnosis  - GI work up all negative, Stool pcr negative, cdiff negative in previous admission  - Seen By GI inpatient and recommended OP f/u. no need for EGD inpatient  - Ordered 3 doses of K 40 mEq  - replete electrolytes prn, keep K>4 and Mg>2  - S/p 2L bolus in ED  - C/w LR at 75 cc/hr  - Start clear liquid diet for now  - zofran for nausea  - send Cdiff and GI pcr  - If work up all negative, consider imodium for diarrhea  - GI consult for possible intervention    #Recent Severe candida esophagitis   - EGD: 12/3 -> severe candida esophagitis (confirmed on biopsy), HP neg  - s/p finished course of diflucan  - HIV non reactive   - Hep C negative  - hep B negative  - GI recc appreciated OP follow up, no need for endoscopy at this time    #Breast Cancer  #Leukopenia  - neutropenia and leukopenia   - heme/onc eval for severe neuropenia () (follows with Dr. Guzman)   - Dr. Tang ([phone]) for Heme  - WBC improved temporarily on recent admission due to Neupogen x1    #HTN   - c/w losartan 100mg and procardia    #HLD  - continue with atorvastatin while inpatient     #Depression  - continue with wellbutrin xL 300mg PO daily     #Fibromyalgia  - continue with duloxetine 60mg PO daily     #Misc   - DVT ppx: lovenox  - GI ppx: protonix   - Diet: clears  dispo: acute

## 2021-12-28 NOTE — ED PROVIDER NOTE - NS ED ROS FT
Review of Systems:  	•	CONSTITUTIONAL - no fever, no diaphoresis, no chills  	•	SKIN - no rash  	•	HEMATOLOGIC - no bleeding, no bruising  	•	EYES - no eye pain, no blurry vision  	•	ENT - no change in hearing, no sore throat, no ear pain or tinnitus  	•	RESPIRATORY - no shortness of breath, no cough  	•	CARDIAC - no chest pain, no palpitations  	•	GI - no abd pain, no nausea, + vomiting, + diarrhea, no constipation  	•	GENITO-URINARY - no discharge, no dysuria; no hematuria, no increased urinary frequency  	•	MUSCULOSKELETAL - no joint paint, no swelling, no redness  	•	NEUROLOGIC - no weakness, no headache, no paresthesias, no LOC

## 2021-12-28 NOTE — CONSULT NOTE ADULT - ATTENDING COMMENTS
Patient is seen for subacute diarrhea which is nonbloody in nature. She is neutropenic and is undergoing neutropenia work-up. We recommend stool studies to rule out infectious etiology including C. difficile in general GI PCR panel. Resume diet as tolerated IV hydration is recommended will follow.

## 2021-12-28 NOTE — CONSULT NOTE ADULT - ASSESSMENT
71 year old female with PMH of IBS-D on amitryptiline, Hx of  pancreatitis, Breast Ca s/p radiation on Letrozole, Severe candida esophagitis s/p 21 day tx w/ fluconazole presents for vomiting and diarrhea x 1 month.     #Acute NBNB vomiting and watery diarrhea - r/o infectious etiology - Resolving  #Severe Neutropenia w/ ANC of 280  - no evidence of sepsis  - EGD 12/3 - severe esophageal candidiasis. duodenal bx not convincing for celiac. no H>P gastritis  - CF 10/9 - x3 (5-10mm) TA removed. External hemorrhoids. LC diverticulosis mild. TI wnl. random bx no evidence of IBD or microscopic colitis    Rec  - please send GI PCR, c. diff, fecal calprotectin and lactoferrin  - please send esr and crp  - please obtain CTAP w/ IV contrast if pt continues to have diarrhea  - start pt on clears and if tolerates can advance as tolerated - low residue low fiber  - zofran prn for nausea  - c/w amitryptiline for IBS-D  - if above workup is unremarkable can consider starting imodium  - c/w protonix 40 BID and start carafate 1g QID  71 year old female with PMH of IBS-D on amitryptiline, Hx of  pancreatitis, Breast Ca s/p radiation on Letrozole, Severe candida esophagitis s/p 21 day tx w/ fluconazole presents for vomiting and diarrhea x 1 month.     #Acute NBNB vomiting and watery diarrhea - r/o infectious etiology - Resolving  #Severe Neutropenia w/ ANC of 280  - no evidence of sepsis  - EGD 12/3 - severe esophageal candidiasis. duodenal bx not diagnostic of celiac. no H>P gastritis  - CF 10/9 - x3 (5-10mm) TA removed. External hemorrhoids. LC diverticulosis mild. TI wnl. random bx no evidence of IBD or microscopic colitis    Rec  - please send GI PCR, c. diff, fecal calprotectin and lactoferrin  - please send esr and crp  - please obtain CTAP w/ IV contrast if pt continues to have diarrhea  - start pt on clears and if tolerates can advance as tolerated - low residue low fiber  - zofran prn for nausea  - c/w amitryptiline for IBS-D  - if above workup is unremarkable can consider starting imodium  - c/w protonix 40 BID and start carafate 1g QID

## 2021-12-29 LAB
ALBUMIN SERPL ELPH-MCNC: 4.1 G/DL — SIGNIFICANT CHANGE UP (ref 3.5–5.2)
ALP SERPL-CCNC: 67 U/L — SIGNIFICANT CHANGE UP (ref 30–115)
ALT FLD-CCNC: 15 U/L — SIGNIFICANT CHANGE UP (ref 0–41)
ANION GAP SERPL CALC-SCNC: 18 MMOL/L — HIGH (ref 7–14)
AST SERPL-CCNC: 15 U/L — SIGNIFICANT CHANGE UP (ref 0–41)
BASOPHILS # BLD AUTO: 0.03 K/UL — SIGNIFICANT CHANGE UP (ref 0–0.2)
BASOPHILS NFR BLD AUTO: 0.8 % — SIGNIFICANT CHANGE UP (ref 0–1)
BILIRUB SERPL-MCNC: 0.5 MG/DL — SIGNIFICANT CHANGE UP (ref 0.2–1.2)
BUN SERPL-MCNC: 9 MG/DL — LOW (ref 10–20)
CALCIUM SERPL-MCNC: 9.5 MG/DL — SIGNIFICANT CHANGE UP (ref 8.5–10.1)
CHLORIDE SERPL-SCNC: 104 MMOL/L — SIGNIFICANT CHANGE UP (ref 98–110)
CO2 SERPL-SCNC: 18 MMOL/L — SIGNIFICANT CHANGE UP (ref 17–32)
CREAT SERPL-MCNC: 0.7 MG/DL — SIGNIFICANT CHANGE UP (ref 0.7–1.5)
CRP SERPL-MCNC: 76 MG/L — HIGH
EOSINOPHIL # BLD AUTO: 0.17 K/UL — SIGNIFICANT CHANGE UP (ref 0–0.7)
EOSINOPHIL NFR BLD AUTO: 4.7 % — SIGNIFICANT CHANGE UP (ref 0–8)
ERYTHROCYTE [SEDIMENTATION RATE] IN BLOOD: 54 MM/HR — HIGH (ref 0–20)
GLUCOSE SERPL-MCNC: 91 MG/DL — SIGNIFICANT CHANGE UP (ref 70–99)
HCT VFR BLD CALC: 39.3 % — SIGNIFICANT CHANGE UP (ref 37–47)
HGB BLD-MCNC: 13 G/DL — SIGNIFICANT CHANGE UP (ref 12–16)
IMM GRANULOCYTES NFR BLD AUTO: 1.7 % — HIGH (ref 0.1–0.3)
LYMPHOCYTES # BLD AUTO: 1.56 K/UL — SIGNIFICANT CHANGE UP (ref 1.2–3.4)
LYMPHOCYTES # BLD AUTO: 43.6 % — SIGNIFICANT CHANGE UP (ref 20.5–51.1)
MCHC RBC-ENTMCNC: 31 PG — SIGNIFICANT CHANGE UP (ref 27–31)
MCHC RBC-ENTMCNC: 33.1 G/DL — SIGNIFICANT CHANGE UP (ref 32–37)
MCV RBC AUTO: 93.8 FL — SIGNIFICANT CHANGE UP (ref 81–99)
MONOCYTES # BLD AUTO: 0.46 K/UL — SIGNIFICANT CHANGE UP (ref 0.1–0.6)
MONOCYTES NFR BLD AUTO: 12.8 % — HIGH (ref 1.7–9.3)
NEUTROPHILS # BLD AUTO: 1.3 K/UL — LOW (ref 1.4–6.5)
NEUTROPHILS NFR BLD AUTO: 36.4 % — LOW (ref 42.2–75.2)
NRBC # BLD: 0 /100 WBCS — SIGNIFICANT CHANGE UP (ref 0–0)
PLATELET # BLD AUTO: 312 K/UL — SIGNIFICANT CHANGE UP (ref 130–400)
POTASSIUM SERPL-MCNC: 3.8 MMOL/L — SIGNIFICANT CHANGE UP (ref 3.5–5)
POTASSIUM SERPL-SCNC: 3.8 MMOL/L — SIGNIFICANT CHANGE UP (ref 3.5–5)
PROT SERPL-MCNC: 6.4 G/DL — SIGNIFICANT CHANGE UP (ref 6–8)
RBC # BLD: 4.19 M/UL — LOW (ref 4.2–5.4)
RBC # FLD: 14.7 % — HIGH (ref 11.5–14.5)
SODIUM SERPL-SCNC: 140 MMOL/L — SIGNIFICANT CHANGE UP (ref 135–146)
WBC # BLD: 3.58 K/UL — LOW (ref 4.8–10.8)
WBC # FLD AUTO: 3.58 K/UL — LOW (ref 4.8–10.8)

## 2021-12-29 PROCEDURE — 99233 SBSQ HOSP IP/OBS HIGH 50: CPT

## 2021-12-29 PROCEDURE — 74177 CT ABD & PELVIS W/CONTRAST: CPT | Mod: 26

## 2021-12-29 RX ORDER — IOHEXOL 300 MG/ML
30 INJECTION, SOLUTION INTRAVENOUS ONCE
Refills: 0 | Status: COMPLETED | OUTPATIENT
Start: 2021-12-29 | End: 2021-12-29

## 2021-12-29 RX ORDER — LOPERAMIDE HCL 2 MG
2 TABLET ORAL ONCE
Refills: 0 | Status: COMPLETED | OUTPATIENT
Start: 2021-12-29 | End: 2021-12-29

## 2021-12-29 RX ADMIN — Medication 25 MILLIGRAM(S): at 21:35

## 2021-12-29 RX ADMIN — Medication 650 MILLIGRAM(S): at 21:35

## 2021-12-29 RX ADMIN — Medication 1 GRAM(S): at 06:33

## 2021-12-29 RX ADMIN — PANTOPRAZOLE SODIUM 40 MILLIGRAM(S): 20 TABLET, DELAYED RELEASE ORAL at 06:34

## 2021-12-29 RX ADMIN — DULOXETINE HYDROCHLORIDE 60 MILLIGRAM(S): 30 CAPSULE, DELAYED RELEASE ORAL at 13:12

## 2021-12-29 RX ADMIN — Medication 0.25 MILLIGRAM(S): at 21:37

## 2021-12-29 RX ADMIN — LETROZOLE 2.5 MILLIGRAM(S): 2.5 TABLET, FILM COATED ORAL at 13:12

## 2021-12-29 RX ADMIN — Medication 60 MILLIGRAM(S): at 06:33

## 2021-12-29 RX ADMIN — LOSARTAN POTASSIUM 100 MILLIGRAM(S): 100 TABLET, FILM COATED ORAL at 06:34

## 2021-12-29 RX ADMIN — BUPROPION HYDROCHLORIDE 300 MILLIGRAM(S): 150 TABLET, EXTENDED RELEASE ORAL at 13:12

## 2021-12-29 RX ADMIN — ATORVASTATIN CALCIUM 80 MILLIGRAM(S): 80 TABLET, FILM COATED ORAL at 21:35

## 2021-12-29 RX ADMIN — Medication 2 MILLIGRAM(S): at 21:35

## 2021-12-29 RX ADMIN — ENOXAPARIN SODIUM 40 MILLIGRAM(S): 100 INJECTION SUBCUTANEOUS at 13:12

## 2021-12-29 RX ADMIN — Medication 650 MILLIGRAM(S): at 06:34

## 2021-12-29 RX ADMIN — IOHEXOL 30 MILLILITER(S): 300 INJECTION, SOLUTION INTRAVENOUS at 08:26

## 2021-12-29 RX ADMIN — Medication 1 GRAM(S): at 13:12

## 2021-12-29 NOTE — PROGRESS NOTE ADULT - SUBJECTIVE AND OBJECTIVE BOX
Gastroenterology progress note:     Patient is a 71y old  Female who presents with a chief complaint of diarrhea (28 Dec 2021 10:17)       Admitted on: 21    We are following the patient for: vomiting and diarrhea       Interval History:    No acute events overnight.   - Diet - npo  - last BM - 2 days ago  - Abdominal pain - none      PAST MEDICAL & SURGICAL HISTORY:  Pancreatitis, gallstone    Breast cancer  RT    Hypertension    Shingles rash    History of major abdominal surgery    History of seizures as a child    GERD (gastroesophageal reflux disease)     delivery delivered  x2    Abnormal cells in breast  RT breast bumpectomy        MEDICATIONS  (STANDING):  amitriptyline 25 milliGRAM(s) Oral at bedtime  atorvastatin 80 milliGRAM(s) Oral at bedtime  buPROPion XL (24-Hour) . 300 milliGRAM(s) Oral daily  DULoxetine 60 milliGRAM(s) Oral daily  enoxaparin Injectable 40 milliGRAM(s) SubCutaneous daily  lactated ringers. 1000 milliLiter(s) (75 mL/Hr) IV Continuous <Continuous>  letrozole 2.5 milliGRAM(s) Oral daily  losartan 100 milliGRAM(s) Oral daily  NIFEdipine XL 60 milliGRAM(s) Oral daily  pantoprazole    Tablet 40 milliGRAM(s) Oral before breakfast  sucralfate 1 Gram(s) Oral four times a day    MEDICATIONS  (PRN):  acetaminophen     Tablet .. 650 milliGRAM(s) Oral every 6 hours PRN Temp greater or equal to 38C (100.4F), Mild Pain (1 - 3)  ALPRAZolam 0.25 milliGRAM(s) Oral at bedtime PRN insomnia  ondansetron Injectable 4 milliGRAM(s) IV Push every 6 hours PRN Nausea and/or Vomiting      Allergies  penicillin (Unknown)      Review of Systems:   Cardiovascular:  No Chest Pain, No Palpitations  Respiratory:  No Cough, No Dyspnea  Gastrointestinal:  As described in HPI  Skin:  No Skin Lesions, No Jaundice  Neuro:  No Syncope, No Dizziness    Physical Examination:  T(C): 36.1 (21 @ 06:31), Max: 37.1 (21 @ 01:04)  HR: 91 (21 @ 06:31) (76 - 91)  BP: 124/79 (21 @ 06:31) (102/59 - 124/79)  RR: 17 (21 @ 06:31) (17 - 18)  SpO2: 99% (21 @ 06:31) (98% - 99%)        GENERAL: AAOx3, no acute distress.  HEAD:  Atraumatic, Normocephalic  EYES: conjunctiva and sclera clear  NECK: Supple, no JVD or thyromegaly  CHEST/LUNG: Clear to auscultation bilaterally; No wheeze, rhonchi, or rales  HEART: Regular rate and rhythm; normal S1, S2, No murmurs.  ABDOMEN: Soft, nontender, nondistended; Bowel sounds present  NEUROLOGY: No asterixis or tremor.   SKIN: Intact, no jaundice     Data:                        13.4   3.58  )-----------( 390      ( 28 Dec 2021 06:32 )             41.2     Hgb trend:  13.4  21 @ 06:32  14.2  21 @ 21:31          139  |  104  |  12  ----------------------------<  111<H>  4.3   |  18  |  0.9    Ca    9.3      28 Dec 2021 06:32  Mg     1.8         TPro  6.6  /  Alb  4.4  /  TBili  0.5  /  DBili  x   /  AST  19  /  ALT  17  /  AlkPhos  76      Liver panel trend:  TBili 0.5   /   AST 19   /   ALT 17   /   AlkP 76   /   Tptn 6.6   /   Alb 4.4    /   DBili --        TBili 0.4   /   AST 24   /   ALT 18   /   AlkP 82   /   Tptn 6.8   /   Alb 4.5    /   DBili --        TBili 0.6   /   AST 15   /   ALT 12   /   AlkP 70   /   Tptn 6.5   /   Alb 4.3    /   DBili --                   Radiology:

## 2021-12-29 NOTE — PROGRESS NOTE ADULT - ATTENDING COMMENTS
Patient seen and examined independently. Agree with resident note/ history / physical exam and plan of care with following exceptions/additions/updates. Case discussed with patient/pt decision maker, house-staff and nursing. My notes supersedes the resident's notes in case of discrepancies.    a/p  # n/v and diarrhea, fu gi and oncology, poss component of anxiety / pt has a hx of anxiety and IBS. recent workup in [facility] and in [facility] was neg except recent dx of candida esophagititis which she was treated for. for now start symptomatic management   C Diff by PCR Result: Negative (12.15.21 @ 11:10)  GI PCR Panel, Stool (12.15.21 @ 17:54)    Specimen Source: .Stool Feces    Culture Results:   GI PCR Results: NOT detected  *******Please Note:*******  GI panel PCR evaluates for:  Campylobacter, Plesiomonas shigelloides, Salmonella,  Vibrio, Yersinia enterocolitica, Enteroaggregative  Escherichia coli (EAEC), Enteropathogenic E.coli (EPEC),  Enterotoxigenic E. coli (ETEC) lt/st, Shiga-like  toxin-producing E. coli (STEC) stx1/stx2,  Shigella/ Enteroinvasive E. coli (EIEC), Cryptosporidium,  Cyclospora cayetanensis, Entamoeba histolytica,  Giardia lamblia, Adenovirus F 40/41, Astrovirus,  Norovirus GI/GII, Rotavirus A, Sapovirus    # breast ca, followup oncology   # htn cont meds      #Progress Note Handoff  Pending (specify):  Consults_oncology , gi followup   Family discussion: ira pt   Disposition: Home_
Awaiting infectious and inflammatory work-up. Continue IV hydration, diet as tolerated.

## 2021-12-29 NOTE — CONSULT NOTE ADULT - ASSESSMENT
h/o breast cancer stage 1    sp lumpectomy and radiation has scar tissue rt breat  h/o ibs,depression, anxiety and om many medicationd for that   h,o herpes on acyclovir   now has esophageal candidiasis for few weeks now, pt of DR LANGSTON   now has recurrent neutropenia for 1 month  wbc count goes down and comes up with out any meds   hb and plt normal   cyclic neutropenia   ct scans abdomen done here and at [facility] all ok ,  we will consider a bone marrow test as out pt ,discussed with pt   if febrile give one dose of GCSF,  SYMPTOMATIC MANAGMENT   discharge plan as per medical and GI     STACY HECTOR MD

## 2021-12-29 NOTE — CONSULT NOTE ADULT - SUBJECTIVE AND OBJECTIVE BOX
pt was discharged last week from here  now back in ER  admitted for recurrent gi symptoms ,diarrhea ,nausea   vomiting ,  pt only stayed at home for 3 days went to [facility] FOR same symptoms   was there for 3 days ans was discharged home   today feels better ,no diarrhea   no fever  covid negative 
Gastroenterology Consultation:    Patient is a 71y old  Female who presents with a chief complaint of diarrhea (28 Dec 2021 02:13)        Admitted on: 21      HPI:  Patient is a 71 year old female with PMH of IBS, pancreatitis, Breast Ca s/p radiation on Letrozole presents for vomiting and diarrhea x 1 month. Pt admits symptoms began after trip from [place] on . Pt has been having intermittent episodes of vomiting and diarrhea. Pt was seen in ED, with normal ct scans of abdomen, and stool cultures. Pt had endoscopy performed by Dr Babin , which showed esophageal candidiasis, which she was treated with antifungals. Pt noted to be neutropenic in past, and is supposed to follow up with dr wilson. Pt denies abd pain, fever, chills, or urinary symptoms. no chest pain or sob. Reports this is the 6th time this has happened to her but denies any triggering factors.     In the ED, she recieved 2L boluses and pepcid 20mg and reglan IV. Labs are remarkable for WBC 1.52 with , Bicarb 13, Lactate 4.2, repeat lactate s/p fluids 1.9 and K in VBG 2.9. K 40 meEq x3 doses were ordered.     Vital Signs Last 24 Hrs  T(C): 37.3 (28 Dec 2021 00:03), Max: 37.4 (27 Dec 2021 20:55)  T(F): 99.2 (28 Dec 2021 00:03), Max: 99.4 (27 Dec 2021 20:55)  HR: 89 (28 Dec 2021 00:03) (89 - 122)  BP: 115/60 (28 Dec 2021 00:03) (115/60 - 143/92)  BP(mean): --  ABP: --  ABP(mean): --  RR: 20 (28 Dec 2021 00:03) (20 - 22)  SpO2: 95% (28 Dec 2021 00:03) (95% - 99%)     (28 Dec 2021 02:13)        Prior EGD: < from: EGD (21 @ 15:45) >   Esophageal candidiasis (Biopsy).    Erythema in the stomach compatible with non-erosive gastritis. (Biopsy).    Normal mucosa in the whole examined duodenum. (Biopsy).     < end of copied text >      Prior Colonoscopy: < from: Colonoscopy (10.29.21 @ 08:45) >   Polyp (5 mm) in the rectum. (Polypectomy).    Polyp(7 mm) in the proximal descending colon. (Polypectomy).    Polyp (10 mm) in the mid-transverse colon. (Polypectomy).    External hemorrhoids.    Mild diverticulosis of the the left side of the colon.    Otherwise normal colon and terminal ileum (Biopsy).     < end of copied text >        PAST MEDICAL & SURGICAL HISTORY:  Pancreatitis, gallstone    Breast cancer  RT    Hypertension    Shingles rash    History of major abdominal surgery    History of seizures as a child    GERD (gastroesophageal reflux disease)     delivery delivered  x2    Abnormal cells in breast  RT breast bumpectomy          FAMILY HISTORY:      Social History:  Tobacco: denies   Alcohol: denies   Drugs: denies     Home Medications:  amitriptyline 25 mg oral tablet: 1 tab(s) orally once a day (at bedtime) (07 Dec 2021 20:50)  DULoxetine 60 mg oral delayed release capsule: 1 cap(s) orally once a day (07 Dec 2021 20:50)  Femara 2.5 mg oral tablet: 1 tab(s) orally once a day (07 Dec 2021 20:50)  losartan 100 mg oral tablet: 1 tab(s) orally once a day (07 Dec 2021 20:50)  omeprazole 40 mg oral delayed release capsule: 1 cap(s) orally once a day (07 Dec 2021 20:50)  rosuvastatin 20 mg oral tablet: 1 tab(s) orally once a day (07 Dec 2021 20:50)  Wellbutrin  mg/24 hours oral tablet, extended release: 1 tab(s) orally every 24 hours (07 Dec 2021 20:50)  Xanax 0.25 mg oral tablet: 1 tab(s) orally once a day (at bedtime), As Needed (07 Dec 2021 20:51)        MEDICATIONS  (STANDING):  amitriptyline 25 milliGRAM(s) Oral at bedtime  atorvastatin 80 milliGRAM(s) Oral at bedtime  buPROPion XL (24-Hour) . 300 milliGRAM(s) Oral daily  DULoxetine 60 milliGRAM(s) Oral daily  enoxaparin Injectable 40 milliGRAM(s) SubCutaneous daily  lactated ringers. 1000 milliLiter(s) (75 mL/Hr) IV Continuous <Continuous>  letrozole 2.5 milliGRAM(s) Oral daily  losartan 100 milliGRAM(s) Oral daily  NIFEdipine XL 60 milliGRAM(s) Oral daily  pantoprazole    Tablet 40 milliGRAM(s) Oral before breakfast    MEDICATIONS  (PRN):  acetaminophen     Tablet .. 650 milliGRAM(s) Oral every 6 hours PRN Temp greater or equal to 38C (100.4F), Mild Pain (1 - 3)  ALPRAZolam 0.25 milliGRAM(s) Oral at bedtime PRN insomnia  ondansetron Injectable 4 milliGRAM(s) IV Push every 6 hours PRN Nausea and/or Vomiting      Allergies  penicillin (Unknown)      Review of Systems:   Constitutional:  No Fever, No Chills  ENT/Mouth:  No Hearing Changes,  No Difficulty Swallowing  Eyes:  No Eye Pain, No Vision Changes  Cardiovascular:  No Chest Pain, No Palpitations  Respiratory:  No Cough, No Dyspnea  Gastrointestinal:  As described in HPI  Musculoskeletal:  No Joint Swelling, No Back Pain  Skin:  No Skin Lesions, No Jaundice  Neuro:  No Syncope, No Dizziness  Heme/Lymph:  No Bruising, No Bleeding.          Physical Examination:  T(C): 37.3 (21 @ 08:27), Max: 37.4 (21 @ 20:55)  HR: 72 (21 @ 08:27) (72 - 122)  BP: 113/71 (21 @ 08:27) (113/71 - 143/92)  RR: 18 (21 @ 08:27) (18 - 22)  SpO2: 97% (21 @ 08:27) (95% - 99%)  Height (cm): 157.5 (21 @ 20:55)  Weight (kg): 81.6 (21 @ 20:55)        GENERAL: AAOx3, no acute distress.  HEAD:  Atraumatic, Normocephalic  EYES: conjunctiva and sclera clear  NECK: Supple, no JVD or thyromegaly  CHEST/LUNG: Clear to auscultation bilaterally; No wheeze, rhonchi, or rales  HEART: Regular rate and rhythm; normal S1, S2, No murmurs.  ABDOMEN: Soft, nontender, nondistended; Bowel sounds present  NEUROLOGY: No asterixis or tremor.   SKIN: Intact, no jaundice        Data:                        13.4   3.58  )-----------( 390      ( 28 Dec 2021 06:32 )             41.2     Hgb Trend:  13.4  - @ 06:32  14.2  - @ 21:31          139  |  104  |  12  ----------------------------<  111<H>  4.3   |  18  |  0.9    Ca    9.3      28 Dec 2021 06:32  Mg     1.8         TPro  6.6  /  Alb  4.4  /  TBili  0.5  /  DBili  x   /  AST  19  /  ALT  17  /  AlkPhos  76      Liver panel trend:  TBili 0.5   /   AST 19   /   ALT 17   /   AlkP 76   /   Tptn 6.6   /   Alb 4.4    /   DBili --        TBili 0.4   /   AST 24   /   ALT 18   /   AlkP 82   /   Tptn 6.8   /   Alb 4.5    /   DBili --        TBili 0.6   /   AST 15   /   ALT 12   /   AlkP 70   /   Tptn 6.5   /   Alb 4.3    /   DBili --        TBili 0.8   /   AST 19   /   ALT 11   /   AlkP 70   /   Tptn 6.9   /   Alb 4.3    /   DBili --                    Radiology:

## 2021-12-29 NOTE — PROGRESS NOTE ADULT - ASSESSMENT
71 year old female with PMH of IBS-D on amitryptiline, Hx of  pancreatitis, Breast Ca s/p radiation on Letrozole, Severe candida esophagitis s/p 21 day tx w/ fluconazole presents for vomiting and diarrhea x 1 month.     #Acute NBNB vomiting and watery diarrhea - r/o infectious etiology - Resolved  #Severe Neutropenia w/ ANC of 280  - no evidence of sepsis  - EGD 12/3 - severe esophageal candidiasis. duodenal bx not convincing for celiac. no H>P gastritis  - CF 10/9 - x3 (5-10mm) TA removed. External hemorrhoids. LC diverticulosis mild. TI wnl. random bx no evidence of IBD or microscopic colitis    Rec  - please send GI PCR, c. diff, fecal calprotectin and lactoferrin  - please send esr and crp  - please obtain CTAP w/ IV contrast if pt continues to have diarrhea  - start pt on clears and if tolerates can advance as tolerated - low residue low fiber  - zofran prn for nausea  - c/w amitryptiline for IBS-D  - if above workup is unremarkable can consider starting imodium  - c/w protonix 40 BID and start carafate 1g QID

## 2021-12-29 NOTE — PATIENT PROFILE ADULT - FALL HARM RISK - UNIVERSAL INTERVENTIONS
Bed in lowest position, wheels locked, appropriate side rails in place/Call bell, personal items and telephone in reach/Instruct patient to call for assistance before getting out of bed or chair/Non-slip footwear when patient is out of bed/[place] to call system/Physically safe environment - no spills, clutter or unnecessary equipment/Purposeful Proactive Rounding/Room/bathroom lighting operational, light cord in reach

## 2021-12-29 NOTE — PROGRESS NOTE ADULT - SUBJECTIVE AND OBJECTIVE BOX
MANDY THOMPSON 71y Female  MRN#: [id number]   Hospital Day: 1d    SUBJECTIVE  Patient is a 71y old Female who presents with a chief complaint of diarrhea (29 Dec 2021 09:07)  Currently admitted to medicine with the primary diagnosis of Neutropenia      INTERVAL HPI AND OVERNIGHT EVENTS:  Patient was examined and seen at bedside. This morning she is resting comfortably in bed and reports no issues or overnight events.    REVIEW OF SYMPTOMS:  CONSTITUTIONAL: No weakness, fevers or chills; No headaches  EYES: No visual changes, eye pain, or discharge  ENT: No vertigo; No ear pain or change in hearing; No sore throat or difficulty swallowing  NECK: No pain or stiffness  RESPIRATORY: No cough, wheezing, or hemoptysis; No shortness of breath  CARDIOVASCULAR: No chest pain or palpitations  GASTROINTESTINAL: No abdominal or epigastric pain; No nausea, vomiting, or hematemesis; No diarrhea or constipation; No melena or hematochezia  GENITOURINARY: No dysuria, frequency or hematuria  MUSCULOSKELETAL: No joint pain, no muscle pain, no weakness  NEUROLOGICAL: No numbness or weakness  SKIN: No itching or rashes    OBJECTIVE  PAST MEDICAL & SURGICAL HISTORY  Pancreatitis, gallstone  Breast cancer  RT  Hypertension  Shingles rash  History of major abdominal surgery  History of seizures as a child  GERD (gastroesophageal reflux disease)   delivery delivered x2  Abnormal cells in breast  RT breast bumpectomy      ALLERGIES:  penicillin (Unknown)    MEDICATIONS:  STANDING MEDICATIONS  amitriptyline 25 milliGRAM(s) Oral at bedtime  atorvastatin 80 milliGRAM(s) Oral at bedtime  buPROPion XL (24-Hour) . 300 milliGRAM(s) Oral daily  DULoxetine 60 milliGRAM(s) Oral daily  enoxaparin Injectable 40 milliGRAM(s) SubCutaneous daily  lactated ringers. 1000 milliLiter(s) IV Continuous <Continuous>  letrozole 2.5 milliGRAM(s) Oral daily  losartan 100 milliGRAM(s) Oral daily  NIFEdipine XL 60 milliGRAM(s) Oral daily  pantoprazole    Tablet 40 milliGRAM(s) Oral before breakfast  sucralfate 1 Gram(s) Oral four times a day    PRN MEDICATIONS  acetaminophen     Tablet .. 650 milliGRAM(s) Oral every 6 hours PRN  ALPRAZolam 0.25 milliGRAM(s) Oral at bedtime PRN  ondansetron Injectable 4 milliGRAM(s) IV Push every 6 hours PRN    VITAL SIGNS: Last 24 Hours  T(C): 36.1 (29 Dec 2021 06:31), Max: 37.1 (29 Dec 2021 01:04)  T(F): 97 (29 Dec 2021 06:31), Max: 98.8 (29 Dec 2021 01:04)  HR: 91 (29 Dec 2021 06:31) (76 - 91)  BP: 124/79 (29 Dec 2021 06:31) (102/59 - 124/79)  BP(mean): --  RR: 17 (29 Dec 2021 06:31) (17 - 18)  SpO2: 99% (29 Dec 2021 06:31) (98% - 99%)    LABS:                        13.0   3.58  )-----------( 312      ( 29 Dec 2021 04:30 )             39.3         139  |  104  |  12  ----------------------------<  111<H>  4.3   |  18  |  0.9    Ca    9.3      28 Dec 2021 06:32  Mg     1.8         TPro  6.6  /  Alb  4.4  /  TBili  0.5  /  DBili  x   /  AST  19  /  ALT  17  /  AlkPhos  76    Lactate, Blood: 1.1 mmol/L (- @ 11:00)    PHYSICAL EXAM:  CONSTITUTIONAL: No acute distress, well-developed, well-groomed, AAOx3  PULMONARY: Clear to auscultation bilaterally  CARDIOVASCULAR: Regular rate and rhythm  GASTROINTESTINAL: Soft, non-tender, non-distended  MUSCULOSKELETAL: no edema  NEUROLOGY: non-focal  SKIN: No rashes or lesions    ASSESSMENT & PLAN  Patient is a 71 year old female with PMH of IBS, pancreatitis, Breast Ca s/p radiation on Letrozole presents for vomiting and diarrhea x 1 month.     #Diarrhea possibly 2/2 IBS  #HAGMA likely secondary to lactic acidosis and hypokalemia 2/2 diarrhea  #Possible underlying IgG Deficiency  - recently admitted for similar diagnosis  - GI work up all negative, Stool pcr negative, cdiff negative in previous admission  - Seen By GI inpatient and recommended OP f/u. no need for EGD inpatient  - Ordered 3 doses of K 40 mEq  - replete electrolytes prn, keep K>4 and Mg>2  - S/p 2L bolus in ED  - C/w LR at 75 cc/hr  - Start clear liquid diet for now  - zofran for nausea  - send Cdiff and GI pcr  - If work up all negative, consider imodium for diarrhea  - GI consult for possible intervention  - ESR/CRP pending  - CT Ab/Pelvis w/ PO & IV contrast ordered  - c/w CLD, if tolerating will increase to low-residual diet   - c/w amitryptiline for IBS-D  - c/w protonix 40 BID and carafate 1g QID        #Recent Severe candida esophagitis   - EGD: 12/3 -> severe candida esophagitis (confirmed on biopsy), HP neg  - s/p finished course of diflucan  - HIV non reactive   - Hep C negative  - hep B negative  - GI recc appreciated OP follow up, no need for endoscopy at this time    #Breast Cancer  #Leukopenia  - neutropenia and leukopenia   - heme/onc eval for severe neutropenia () (follows with Dr. Guzman)   - Dr. Tang ([phone]) for Heme  - WBC improved temporarily on recent admission due to Neupogen x1  - Hem Onc Consult for neutropenia     #HTN   - c/w losartan 100mg and procardia    #HLD  - continue with atorvastatin while inpatient     #Depression  - continue with wellbutrin xL 300mg PO daily     #Fibromyalgia  - continue with duloxetine 60mg PO daily     #Misc   - DVT ppx: lovenox  - GI ppx: protonix   - Diet: clears  - dispo: acute, pending CT scan  MANDY THOMPSON 71y Female  MRN#: [id number]   Hospital Day: 1d    SUBJECTIVE  Patient is a 71y old Female who presents with a chief complaint of diarrhea (29 Dec 2021 09:07)  Currently admitted to medicine with the primary diagnosis of Neutropenia      INTERVAL HPI AND OVERNIGHT EVENTS:  Patient was examined and seen at bedside. This morning she is resting comfortably in bed and reports no issues or overnight events.    ROS no cp, no sob     REVIEW OF SYMPTOMS:  CONSTITUTIONAL: No weakness, fevers or chills; No headaches  EYES: No visual changes, eye pain, or discharge  ENT: No vertigo; No ear pain or change in hearing; No sore throat or difficulty swallowing  NECK: No pain or stiffness  RESPIRATORY: No cough, wheezing, or hemoptysis; No shortness of breath  CARDIOVASCULAR: No chest pain or palpitations  GASTROINTESTINAL: No abdominal or epigastric pain; No nausea, vomiting, or hematemesis; No diarrhea or constipation; No melena or hematochezia  GENITOURINARY: No dysuria, frequency or hematuria  MUSCULOSKELETAL: No joint pain, no muscle pain, no weakness  NEUROLOGICAL: No numbness or weakness  SKIN: No itching or rashes    OBJECTIVE  PAST MEDICAL & SURGICAL HISTORY  Pancreatitis, gallstone  Breast cancer  RT  Hypertension  Shingles rash  History of major abdominal surgery  History of seizures as a child  GERD (gastroesophageal reflux disease)   delivery delivered x2  Abnormal cells in breast  RT breast bumpectomy      ALLERGIES:  penicillin (Unknown)    MEDICATIONS:  STANDING MEDICATIONS  amitriptyline 25 milliGRAM(s) Oral at bedtime  atorvastatin 80 milliGRAM(s) Oral at bedtime  buPROPion XL (24-Hour) . 300 milliGRAM(s) Oral daily  DULoxetine 60 milliGRAM(s) Oral daily  enoxaparin Injectable 40 milliGRAM(s) SubCutaneous daily  lactated ringers. 1000 milliLiter(s) IV Continuous <Continuous>  letrozole 2.5 milliGRAM(s) Oral daily  losartan 100 milliGRAM(s) Oral daily  NIFEdipine XL 60 milliGRAM(s) Oral daily  pantoprazole    Tablet 40 milliGRAM(s) Oral before breakfast  sucralfate 1 Gram(s) Oral four times a day    PRN MEDICATIONS  acetaminophen     Tablet .. 650 milliGRAM(s) Oral every 6 hours PRN  ALPRAZolam 0.25 milliGRAM(s) Oral at bedtime PRN  ondansetron Injectable 4 milliGRAM(s) IV Push every 6 hours PRN    VITAL SIGNS: Last 24 Hours  T(C): 36.1 (29 Dec 2021 06:31), Max: 37.1 (29 Dec 2021 01:04)  T(F): 97 (29 Dec 2021 06:31), Max: 98.8 (29 Dec 2021 01:04)  HR: 91 (29 Dec 2021 06:31) (76 - 91)  BP: 124/79 (29 Dec 2021 06:31) (102/59 - 124/79)  BP(mean): --  RR: 17 (29 Dec 2021 06:31) (17 - 18)  SpO2: 99% (29 Dec 2021 06:31) (98% - 99%)    LABS:                        13.0   3.58  )-----------( 312      ( 29 Dec 2021 04:30 )             39.3         139  |  104  |  12  ----------------------------<  111<H>  4.3   |  18  |  0.9    Ca    9.3      28 Dec 2021 06:32  Mg     1.8         TPro  6.6  /  Alb  4.4  /  TBili  0.5  /  DBili  x   /  AST  19  /  ALT  17  /  AlkPhos  76    Lactate, Blood: 1.1 mmol/L (- @ 11:00)    PHYSICAL EXAM:  CONSTITUTIONAL: No acute distress, well-developed, well-groomed, AAOx3  PULMONARY: Clear to auscultation bilaterally  CARDIOVASCULAR: Regular rate and rhythm  GASTROINTESTINAL: Soft, non-tender, non-distended  MUSCULOSKELETAL: no edema  NEUROLOGY: non-focal  SKIN: No rashes or lesions    ASSESSMENT & PLAN  Patient is a 71 year old female with PMH of IBS, pancreatitis, Breast Ca s/p radiation on Letrozole presents for vomiting and diarrhea x 1 month.     #Diarrhea possibly 2/2 IBS  #HAGMA likely secondary to lactic acidosis and hypokalemia 2/2 diarrhea  #Possible underlying IgG Deficiency  - recently admitted for similar diagnosis  - GI work up all negative, Stool pcr negative, cdiff negative in previous admission  - Seen By GI inpatient and recommended OP f/u. no need for EGD inpatient  - Ordered 3 doses of K 40 mEq  - replete electrolytes prn, keep K>4 and Mg>2  - S/p 2L bolus in ED  - C/w LR at 75 cc/hr  - Start clear liquid diet for now  - zofran for nausea  - send Cdiff and GI pcr  - If work up all negative, consider imodium for diarrhea  - GI consult for possible intervention  - ESR/CRP pending  - CT Ab/Pelvis w/ PO & IV contrast ordered  - c/w CLD, if tolerating will increase to low-residual diet   - c/w amitryptiline for IBS-D  - c/w protonix 40 BID and carafate 1g QID        #Recent Severe candida esophagitis   - EGD: 12/3 -> severe candida esophagitis (confirmed on biopsy), HP neg  - s/p finished course of diflucan  - HIV non reactive   - Hep C negative  - hep B negative  - GI recc appreciated OP follow up, no need for endoscopy at this time    #Breast Cancer  #Leukopenia  - neutropenia and leukopenia   - heme/onc eval for severe neutropenia () (follows with Dr. Guzman)   - Dr. Tang ([phone]) for Heme  - WBC improved temporarily on recent admission due to Neupogen x1  - Hem Onc Consult for neutropenia     #HTN   - c/w losartan 100mg and procardia    #HLD  - continue with atorvastatin while inpatient     #Depression  - continue with wellbutrin xL 300mg PO daily     #Fibromyalgia  - continue with duloxetine 60mg PO daily     #Misc   - DVT ppx: lovenox  - GI ppx: protonix   - Diet: clears  - dispo: acute, pending CT scan

## 2021-12-30 DIAGNOSIS — M79.7 FIBROMYALGIA: ICD-10-CM

## 2021-12-30 DIAGNOSIS — E87.2 ACIDOSIS: ICD-10-CM

## 2021-12-30 DIAGNOSIS — K21.9 GASTRO-ESOPHAGEAL REFLUX DISEASE WITHOUT ESOPHAGITIS: ICD-10-CM

## 2021-12-30 DIAGNOSIS — E86.0 DEHYDRATION: ICD-10-CM

## 2021-12-30 DIAGNOSIS — B02.9 ZOSTER WITHOUT COMPLICATIONS: ICD-10-CM

## 2021-12-30 DIAGNOSIS — R13.10 DYSPHAGIA, UNSPECIFIED: ICD-10-CM

## 2021-12-30 DIAGNOSIS — I10 ESSENTIAL (PRIMARY) HYPERTENSION: ICD-10-CM

## 2021-12-30 DIAGNOSIS — Z88.0 ALLERGY STATUS TO PENICILLIN: ICD-10-CM

## 2021-12-30 DIAGNOSIS — C50.911 MALIGNANT NEOPLASM OF UNSPECIFIED SITE OF RIGHT FEMALE BREAST: ICD-10-CM

## 2021-12-30 DIAGNOSIS — K58.8 OTHER IRRITABLE BOWEL SYNDROME: ICD-10-CM

## 2021-12-30 DIAGNOSIS — R68.0 HYPOTHERMIA, NOT ASSOCIATED WITH LOW ENVIRONMENTAL TEMPERATURE: ICD-10-CM

## 2021-12-30 DIAGNOSIS — D70.9 NEUTROPENIA, UNSPECIFIED: ICD-10-CM

## 2021-12-30 DIAGNOSIS — B37.81 CANDIDAL ESOPHAGITIS: ICD-10-CM

## 2021-12-30 DIAGNOSIS — F32.9 MAJOR DEPRESSIVE DISORDER, SINGLE EPISODE, UNSPECIFIED: ICD-10-CM

## 2021-12-30 DIAGNOSIS — B00.9 HERPESVIRAL INFECTION, UNSPECIFIED: ICD-10-CM

## 2021-12-30 DIAGNOSIS — E66.9 OBESITY, UNSPECIFIED: ICD-10-CM

## 2021-12-30 LAB
ALBUMIN SERPL ELPH-MCNC: 3.9 G/DL — SIGNIFICANT CHANGE UP (ref 3.5–5.2)
ALP SERPL-CCNC: 62 U/L — SIGNIFICANT CHANGE UP (ref 30–115)
ALT FLD-CCNC: 12 U/L — SIGNIFICANT CHANGE UP (ref 0–41)
ANION GAP SERPL CALC-SCNC: 15 MMOL/L — HIGH (ref 7–14)
AST SERPL-CCNC: 12 U/L — SIGNIFICANT CHANGE UP (ref 0–41)
BASOPHILS # BLD AUTO: 0.03 K/UL — SIGNIFICANT CHANGE UP (ref 0–0.2)
BASOPHILS NFR BLD AUTO: 0.9 % — SIGNIFICANT CHANGE UP (ref 0–1)
BILIRUB SERPL-MCNC: 0.5 MG/DL — SIGNIFICANT CHANGE UP (ref 0.2–1.2)
BUN SERPL-MCNC: 9 MG/DL — LOW (ref 10–20)
CALCIUM SERPL-MCNC: 9.5 MG/DL — SIGNIFICANT CHANGE UP (ref 8.5–10.1)
CHLORIDE SERPL-SCNC: 108 MMOL/L — SIGNIFICANT CHANGE UP (ref 98–110)
CO2 SERPL-SCNC: 20 MMOL/L — SIGNIFICANT CHANGE UP (ref 17–32)
CREAT SERPL-MCNC: 0.8 MG/DL — SIGNIFICANT CHANGE UP (ref 0.7–1.5)
EOSINOPHIL # BLD AUTO: 0.17 K/UL — SIGNIFICANT CHANGE UP (ref 0–0.7)
EOSINOPHIL NFR BLD AUTO: 5 % — SIGNIFICANT CHANGE UP (ref 0–8)
GLUCOSE SERPL-MCNC: 96 MG/DL — SIGNIFICANT CHANGE UP (ref 70–99)
HCT VFR BLD CALC: 36.2 % — LOW (ref 37–47)
HGB BLD-MCNC: 11.9 G/DL — LOW (ref 12–16)
IMM GRANULOCYTES NFR BLD AUTO: 1.2 % — HIGH (ref 0.1–0.3)
LYMPHOCYTES # BLD AUTO: 1.33 K/UL — SIGNIFICANT CHANGE UP (ref 1.2–3.4)
LYMPHOCYTES # BLD AUTO: 39 % — SIGNIFICANT CHANGE UP (ref 20.5–51.1)
MAGNESIUM SERPL-MCNC: 1.8 MG/DL — SIGNIFICANT CHANGE UP (ref 1.8–2.4)
MCHC RBC-ENTMCNC: 30.4 PG — SIGNIFICANT CHANGE UP (ref 27–31)
MCHC RBC-ENTMCNC: 32.9 G/DL — SIGNIFICANT CHANGE UP (ref 32–37)
MCV RBC AUTO: 92.6 FL — SIGNIFICANT CHANGE UP (ref 81–99)
MONOCYTES # BLD AUTO: 0.35 K/UL — SIGNIFICANT CHANGE UP (ref 0.1–0.6)
MONOCYTES NFR BLD AUTO: 10.3 % — HIGH (ref 1.7–9.3)
NEUTROPHILS # BLD AUTO: 1.49 K/UL — SIGNIFICANT CHANGE UP (ref 1.4–6.5)
NEUTROPHILS NFR BLD AUTO: 43.6 % — SIGNIFICANT CHANGE UP (ref 42.2–75.2)
NRBC # BLD: 0 /100 WBCS — SIGNIFICANT CHANGE UP (ref 0–0)
PLATELET # BLD AUTO: 299 K/UL — SIGNIFICANT CHANGE UP (ref 130–400)
POTASSIUM SERPL-MCNC: 4 MMOL/L — SIGNIFICANT CHANGE UP (ref 3.5–5)
POTASSIUM SERPL-SCNC: 4 MMOL/L — SIGNIFICANT CHANGE UP (ref 3.5–5)
PROT SERPL-MCNC: 6 G/DL — SIGNIFICANT CHANGE UP (ref 6–8)
RBC # BLD: 3.91 M/UL — LOW (ref 4.2–5.4)
RBC # FLD: 14.6 % — HIGH (ref 11.5–14.5)
SODIUM SERPL-SCNC: 143 MMOL/L — SIGNIFICANT CHANGE UP (ref 135–146)
WBC # BLD: 3.41 K/UL — LOW (ref 4.8–10.8)
WBC # FLD AUTO: 3.41 K/UL — LOW (ref 4.8–10.8)

## 2021-12-30 PROCEDURE — 99233 SBSQ HOSP IP/OBS HIGH 50: CPT

## 2021-12-30 RX ORDER — IBUPROFEN 200 MG
600 TABLET ORAL ONCE
Refills: 0 | Status: COMPLETED | OUTPATIENT
Start: 2021-12-30 | End: 2021-12-30

## 2021-12-30 RX ADMIN — LETROZOLE 2.5 MILLIGRAM(S): 2.5 TABLET, FILM COATED ORAL at 15:14

## 2021-12-30 RX ADMIN — BUPROPION HYDROCHLORIDE 300 MILLIGRAM(S): 150 TABLET, EXTENDED RELEASE ORAL at 12:17

## 2021-12-30 RX ADMIN — Medication 650 MILLIGRAM(S): at 17:30

## 2021-12-30 RX ADMIN — PANTOPRAZOLE SODIUM 40 MILLIGRAM(S): 20 TABLET, DELAYED RELEASE ORAL at 06:29

## 2021-12-30 RX ADMIN — ATORVASTATIN CALCIUM 80 MILLIGRAM(S): 80 TABLET, FILM COATED ORAL at 21:38

## 2021-12-30 RX ADMIN — DULOXETINE HYDROCHLORIDE 60 MILLIGRAM(S): 30 CAPSULE, DELAYED RELEASE ORAL at 12:17

## 2021-12-30 RX ADMIN — Medication 600 MILLIGRAM(S): at 21:31

## 2021-12-30 RX ADMIN — Medication 0.25 MILLIGRAM(S): at 21:38

## 2021-12-30 RX ADMIN — Medication 650 MILLIGRAM(S): at 16:57

## 2021-12-30 RX ADMIN — Medication 60 MILLIGRAM(S): at 05:52

## 2021-12-30 RX ADMIN — Medication 25 MILLIGRAM(S): at 21:38

## 2021-12-30 RX ADMIN — LOSARTAN POTASSIUM 100 MILLIGRAM(S): 100 TABLET, FILM COATED ORAL at 05:52

## 2021-12-30 RX ADMIN — ENOXAPARIN SODIUM 40 MILLIGRAM(S): 100 INJECTION SUBCUTANEOUS at 12:17

## 2021-12-30 NOTE — PROGRESS NOTE ADULT - SUBJECTIVE AND OBJECTIVE BOX
MANDY THOMPSON 71y Female  MRN#: [id number]   Hospital Day: 2d    SUBJECTIVE  Patient is a 71y old Female who presents with a chief complaint of diarrhea (29 Dec 2021 16:32)  Currently admitted to medicine with the primary diagnosis of Neutropenia      INTERVAL HPI AND OVERNIGHT EVENTS:  Patient was examined and seen at bedside. This morning she is resting comfortably in bed and reports no issues or overnight events. Reports no nausea or vomiting overnight while she has been here.       OBJECTIVE  PAST MEDICAL & SURGICAL HISTORY  Pancreatitis, gallstone    Breast cancer  RT    Hypertension    Shingles rash    History of major abdominal surgery    History of seizures as a child    GERD (gastroesophageal reflux disease)     delivery delivered  x2    Abnormal cells in breast  RT breast bumpectomy      ALLERGIES:  penicillin (Unknown)    MEDICATIONS:  STANDING MEDICATIONS  amitriptyline 25 milliGRAM(s) Oral at bedtime  atorvastatin 80 milliGRAM(s) Oral at bedtime  buPROPion XL (24-Hour) . 300 milliGRAM(s) Oral daily  DULoxetine 60 milliGRAM(s) Oral daily  enoxaparin Injectable 40 milliGRAM(s) SubCutaneous daily  letrozole 2.5 milliGRAM(s) Oral daily  losartan 100 milliGRAM(s) Oral daily  NIFEdipine XL 60 milliGRAM(s) Oral daily  pantoprazole    Tablet 40 milliGRAM(s) Oral before breakfast    PRN MEDICATIONS  acetaminophen     Tablet .. 650 milliGRAM(s) Oral every 6 hours PRN  ALPRAZolam 0.25 milliGRAM(s) Oral at bedtime PRN  ondansetron Injectable 4 milliGRAM(s) IV Push every 6 hours PRN      PHYSICAL EXAM:  Constitutional: pt is comfortable sitting in bed; no acute distress; well-groomed  HEENT: Full ROM in bilateral eyes; pupils symmetrical and equal in size; neck supple  Respiratory: (+) sounds in all lung fields; no crackles or wheezes appreciated  Cardiovascular: S1 S2 regular rate and rhythm; no murmurs or gallops appreciated  Gastrointestinal: (+) bowel sounds in all quadrants; abdomen is non-distended, non-tender to superficial and deep palpation  Extremities: extremities are non-edematous  Vascular: Warm and Well perfused UE and LE; no mottling or dusky skin   Neurological: AxO x3 to self, place and year  Skin: no raches or ulcerations noted; no scaling present      VITAL SIGNS: Last 24 Hours  T(C): 35.9 (30 Dec 2021 12:46), Max: 36.5 (30 Dec 2021 04:40)  T(F): 96.7 (30 Dec 2021 12:46), Max: 97.7 (30 Dec 2021 04:40)  HR: 69 (30 Dec 2021 12:46) (69 - 72)  BP: 129/73 (30 Dec 2021 12:46) (124/78 - 129/73)  BP(mean): --  RR: 18 (30 Dec 2021 12:46) (17 - 18)  SpO2: 98% (29 Dec 2021 21:32) (98% - 98%)    LABS:                        11.9   3.41  )-----------( 299      ( 30 Dec 2021 04:30 )             36.2     1230    143  |  108  |  9<L>  ----------------------------<  96  4.0   |  20  |  0.8    Ca    9.5      30 Dec 2021 04:30    TPro  6.0  /  Alb  3.9  /  TBili  0.5  /  DBili  x   /  AST  12  /  ALT  12  /  AlkPhos  62  12-30      RADIOLOGY:    < from: CT Abdomen and Pelvis w/ Oral Cont and w/ IV Cont (21 @ 12:46) >    1.  No CT evidence of acute abdominopelvic pathology.  2.  Redemonstration of partially imaged right breast structure measuring   at least 2.6 x 2.0 x 3.3 cm as seen on prior chest CT. Differential   includes mass versus collection. Correlate with clinical history and   followup with mammography recommended.    < end of copied text >

## 2021-12-30 NOTE — PROGRESS NOTE ADULT - SUBJECTIVE AND OBJECTIVE BOX
MANDY THOMPSON  71y  Female  ***My note supersedes ALL resident notes that I sign.  My corrections for their notes are in my note.***    I can be reached directly on [id number]. My office number is [phone]. My personal cell number is [phone].    INTERVAL EVENTS: Here for f/u of N/V. This is pt's 5th admission ([facility] x1, [facility] x1, [facility] x3) for same issue in past 1.5 months. Pt was well until 2 days after returning from vacation in [place]. Pt went w/ her children and grandchildren. She stayed only at 1 resort and never left it. No one else from her family has been ill. Pt gets severe bouts of N/V and diarrhea that come out of nowhere.  She feels the N/V are more than the diarrhea. She reports chills, but no fever. There was a 3-day span w/ night sweats, but these have abated. She had c-scope Oct - neg. EGD 12/3 showed esoph candidiasis (in assoc w/ neutropenia) and she completed 21-days of diflucan w/out issue. Each time pt has left hospital, she has felt well enough to leave and wanted to leave.  However, in a couple of days the symptoms return; one time the symptoms came back w/in 6 hours of leaving the hospital. Pt says that N/V are so bad that she has to come back to hospital for IVFs and IV meds. There is no blood in stool or vomit. She has long standing IBS, which flares during these episodes. However, after they stop, then she is constipated for 4 days. Stool w/u neg. Blood w/u neg. Psych meds are chronic and no changes have been made to them recently. Pt has never heard that her WBC was ever low. She has never heard of cyclic neutropenia as dx for herself or family. Br Ca is well followed up. It was dx'd at early stage in 2017. She is s/p lumpectomy and RT. Never needed chemo. She is on letrozole. Onc f/u show remission of dz.    Today, pt is well and feels like she could go home, but she is afraid to leave. Pt tolerating liquids, so diet will be advanced. She has no pain.    I spoke w/ ID attd who recently saw her on last admit: he agrees w/ rifaximin trial; check CMV titers; can check ehrlichia (but he doubts); he is recommending BM bx.     T(F): 96.7 (12-30-21 @ 12:46), Max: 97.7 (12-30-21 @ 04:40)  HR: 69 (12-30-21 @ 12:46) (69 - 72)  BP: 129/73 (12-30-21 @ 12:46) (124/78 - 129/73)  RR: 18 (12-30-21 @ 12:46) (17 - 18)  SpO2: 98% (12-29-21 @ 21:32) (98% - 98%)          LABS:    RADIOLOGY & ADDITIONAL TESTS:      MEDICATIONS:  rifAXIMin 550 milliGRAM(s) Oral every 8 hours    acetaminophen     Tablet .. 650 milliGRAM(s) Oral every 6 hours PRN  ALPRAZolam 0.25 milliGRAM(s) Oral at bedtime PRN  amitriptyline 25 milliGRAM(s) Oral at bedtime  atorvastatin 80 milliGRAM(s) Oral at bedtime  buPROPion XL (24-Hour) . 300 milliGRAM(s) Oral daily  DULoxetine 60 milliGRAM(s) Oral daily  enoxaparin Injectable 40 milliGRAM(s) SubCutaneous daily  letrozole 2.5 milliGRAM(s) Oral daily  losartan 100 milliGRAM(s) Oral daily  NIFEdipine XL 60 milliGRAM(s) Oral daily  ondansetron Injectable 4 milliGRAM(s) IV Push every 6 hours PRN  pantoprazole    Tablet 40 milliGRAM(s) Oral before breakfast     MANDY THOMPSON  71y  Female  ***My note supersedes ALL resident notes that I sign.  My corrections for their notes are in my note.***    I can be reached directly on [employer] [id number]. My office number is [phone]. My personal cell number is [phone].    INTERVAL EVENTS: Here for f/u of N/V. This is pt's 5th admission ([facility] x1, [facility] x1, [facility] x3) for same issue in past 1.5 months. Pt was well until 2 days after returning from vacation in [place]. Pt went w/ her children and grandchildren. She stayed only at 1 resort and never left it. No one else from her family has been ill. Pt gets severe bouts of N/V and diarrhea that come out of nowhere.  She feels the N/V are more than the diarrhea. She reports chills, but no fever. There was a 3-day span w/ night sweats, but these have abated. She had c-scope Oct - neg. EGD 12/3 showed esoph candidiasis (in assoc w/ neutropenia) and she completed 21-days of diflucan w/out issue. Each time pt has left hospital, she has felt well enough to leave and wanted to leave.  However, in a couple of days the symptoms return; one time the symptoms came back w/in 6 hours of leaving the hospital. Pt says that N/V are so bad that she has to come back to hospital for IVFs and IV meds. There is no blood in stool or vomit. She has long standing IBS, which flares during these episodes. However, after they stop, then she is constipated for 4 days. Stool w/u neg. Blood w/u neg. Psych meds are chronic and no changes have been made to them recently. Pt has never heard that her WBC was ever low. She has never heard of cyclic neutropenia as dx for herself or family. Br Ca is well followed up. It was dx'd at early stage in 2017. She is s/p lumpectomy and RT. Never needed chemo. She is on letrozole. Onc f/u show remission of dz.    Today, pt is well and feels like she could go home, but she is afraid to leave. Pt tolerating liquids, so diet will be advanced. She has no pain.    I spoke w/ ID attd who recently saw her on last admit: he agrees w/ rifaximin trial; check CMV titers; can check ehrlichia (but he doubts); he is recommending BM bx.     T(F): 96.7 (12-30-21 @ 12:46), Max: 97.7 (12-30-21 @ 04:40)  HR: 69 (12-30-21 @ 12:46) (69 - 72)  BP: 129/73 (12-30-21 @ 12:46) (124/78 - 129/73)  RR: 18 (12-30-21 @ 12:46) (17 - 18)  SpO2: 98% (12-29-21 @ 21:32) (98% - 98%)    Gen: NAD  HEENT: PERRL, EOMI, mouth clr, nose clr  Neck: no nodes, no JVD, thyroid nl  lungs: clr  hrt: s1 s2 rrr no murmur  abd: soft, NT/ND, no HS megaly  ext: no edema, no c/c  neuro: aa ox3, cn intact, can move all 4 ext    LABS:                      11.9    (    92.6   3.41  )-----------( ---------      299      ( 30 Dec 2021 04:30 )             36.2    (    14.6     WBC Count: 3.41 K/uL (12-30-21 @ 04:30) PMN 44%; Lymph 39%; Mono 10%; Eos 5%; immat gran 1%  WBC Count: 3.58 K/uL (12-29-21 @ 04:30)  WBC Count: 3.58 K/uL (12-28-21 @ 06:32)  WBC Count: 1.52 K/uL (12-27-21 @ 21:31)    143   (   108   (   96      12-30-21 @ 04:30  ----------------------               4.0   (   20   (   9        AG = 15                        -----                        0.8  Ca  9.5   Mg  --    P   --     LFT  6.0  (  0.5  (  12       12-30-21 @ 04:30  -------------------------  3.9  (  62  (  12    RADIOLOGY & ADDITIONAL TESTS:  < from: CT Abdomen and Pelvis w/ Oral Cont and w/ IV Cont (12.29.21 @ 12:46) >  IMPRESSION:    1.  No CT evidence of acute abdominopelvic pathology.  2.  Redemonstration of partially imaged right breast structure measuring   at least 2.6 x 2.0 x 3.3 cm as seen on prior chest CT. Differential   includes mass versus collection. Correlate with clinical history and   followup with mammography recommended.    < end of copied text >    MEDICATIONS:  rifAXIMin 550 milliGRAM(s) Oral every 8 hours    acetaminophen     Tablet .. 650 milliGRAM(s) Oral every 6 hours PRN  ALPRAZolam 0.25 milliGRAM(s) Oral at bedtime PRN  amitriptyline 25 milliGRAM(s) Oral at bedtime  atorvastatin 80 milliGRAM(s) Oral at bedtime  buPROPion XL (24-Hour) . 300 milliGRAM(s) Oral daily  DULoxetine 60 milliGRAM(s) Oral daily  enoxaparin Injectable 40 milliGRAM(s) SubCutaneous daily  letrozole 2.5 milliGRAM(s) Oral daily  losartan 100 milliGRAM(s) Oral daily  NIFEdipine XL 60 milliGRAM(s) Oral daily  ondansetron Injectable 4 milliGRAM(s) IV Push every 6 hours PRN  pantoprazole    Tablet 40 milliGRAM(s) Oral before breakfast

## 2021-12-30 NOTE — PROGRESS NOTE ADULT - ASSESSMENT
HOSPITAL COURSE:    Patient is a 71 year old female with PMH of IBS, pancreatitis, Breast Ca s/p radiation on Letrozole presents for vomiting and diarrhea x 1 month.       ASSESSMENT & PLAN:    #Diarrhea possibly 2/2 IBS, r/o infectious causes  #HAGMA likely secondary to lactic acidosis and hypokalemia 2/2 diarrhea  - recently admitted 5x at various hospitals for similar complaints; extensive workup at [facility] thus unremarkable   - CT AP with IV and PO contrast unrevealing on this visit   - GI work up all negative, Stool pcr negative, cdiff negative in previous admission; f/u repeat c. diff and GI PCR this admission  - GI - recommendations: no need for scope at this time since pt had recent EGD in 12/2021; f/u c.diff, GI PCR , ESR, CRP   - replete electrolytes prn and monitor due to GI losses, keep K>4 and Mg>2  - Advanced to Low Fiber/Low Residue/Lactose Free diet on 12/30   - zofran and carafate and PPI for symptomatic management   - If work up all negative, consider imodium for diarrhea      #Recent Severe candida esophagitis   - EGD: 12/3 -> severe candida esophagitis (confirmed on biopsy), HP neg  - s/p finished course of diflucan  - HIV non reactive   - Hep C negative  - hep B negative  - GI recc appreciated OP follow up, no need for endoscopy at this time    #Breast Cancer  #Leukopenia  - neutropenia and leukopenia   - heme/onc eval for severe neutropenia () (follows with Dr. Guzman)   - Dr. Tang ([phone]) for Heme  - WBC improved temporarily on recent admission due to Neupogen x1  - Hem Onc Consult for neutropenia     #HTN   - c/w losartan 100mg and procardia    #HLD  - continue with atorvastatin while inpatient     #Depression  - continue with wellbutrin xL 300mg PO daily     #Fibromyalgia  - continue with duloxetine 60mg PO daily     #Misc   - DVT ppx: lovenox  - GI ppx: protonix   - Diet: clears  - dispo: acute, pending CT scan    HOSPITAL COURSE:    Patient is a 71 year old female with PMH of IBS, pancreatitis, Breast Ca s/p radiation on Letrozole presents for vomiting and diarrhea x 1 month.       ASSESSMENT & PLAN:    #Diarrhea possibly 2/2 IBS, r/o infectious causes  #HAGMA likely secondary to lactic acidosis and hypokalemia 2/2 diarrhea  - recently admitted 5x at various hospitals for similar complaints; extensive workup at [facility] thus unremarkable   - CT AP with IV and PO contrast unrevealing on this visit   - GI work up all negative, Stool pcr negative, cdiff negative in previous admission; f/u repeat c. diff and GI PCR this admission  - GI - recommendations: no need for scope at this time since pt had recent EGD in 12/2021; f/u c.diff, GI PCR , ESR, CRP   - replete electrolytes prn and monitor due to GI losses, keep K>4 and Mg>2  - Advanced to Low Fiber/Low Residue/Lactose Free diet on 12/30   - zofran and carafate and PPI for symptomatic management   - If work up all negative, consider imodium for diarrhea      #Recent Severe candida esophagitis   - EGD: 12/3 -> severe candida esophagitis (confirmed on biopsy_  - s/p finished course of diflucan  - HIV non reactive   - Hep C negative  - hep B negative  - GI recc appreciated OP follow up, no need for endoscopy at this time    #Breast Cancer  #Leukopenia  - neutropenia and leukopenia - cyclic; normal Plts and Hb this visit, low WBC   - heme/onc eval for severe neutropenia () (follows with Dr. Guzman)   - Dr. Tang ([phone]) for Heme  - per heme onc- outpatient BM biopsy; Zarxio if febrile this visit     #HTN   - c/w losartan 100mg and procardia    #HLD  - continue with atorvastatin while inpatient     #Depression  - continue with wellbutrin xL 300mg PO daily     #Fibromyalgia  - continue with duloxetine 60mg PO daily     #Misc   - DVT ppx: lovenox  - GI ppx: protonix   - Diet: clears  - dispo: acute, pending CT scan

## 2021-12-30 NOTE — PROGRESS NOTE ADULT - ASSESSMENT
71 year old woman with PMH of IBS, pancreatitis, Breast Ca s/p radiation on Letrozole presents for vomiting and diarrhea x 1.5 months on/off.     # N/V & Diarrhea of unclear etiology; hx IBS; high gap metab acid - resolving; hypokalemia 2/2 diarrhea; on/off neutropenia (can be profound)  recently admitted 5x at various hospitals for similar complaints; extensive workup at [facility] thus unremarkable   CT AP with IV and PO contrast unrevealing on this visit   GI work up all negative, Stool pcr negative, cdiff negative in previous admission  flow cyto serum at [facility] is neg  HIV neg; Hep neg; EBV neg; TSH nl  f/u repeat c. diff and GI PCR this admission - if neg, OK to use imodium  ESR 54; CRP 76  obtain SHARLENE  obtain Ehrlichia/anaplasma PCR and Ab; CMV IgG & IgM;  obtain B12, Fol  obtain Ferritin and TG (r/o HLH)  trial rifaximin 550mg po q8 x10 days (for traveler's diarrhea and IBS - diarrhea predom)  diet: DASH; low resid; lactose free  zofran prn  PPI po q24  d/c carafate - pt does not like it  IVFs: none  pain: none  h/o should consider BM bx  f/u GI    # doubt cyclic neutropenia  this is very rare dx  need to see cycles q21 days or so, which is NOT in her hx    # Recent Severe candida esophagitis (prob from severe neutropenia)  EGD: 12/3 -> severe candida esophagitis (confirmed on biopsy)  s/p finished 21-day course of diflucan    # hx of recurrent HSV  had recent bx proven episode at [facility] and was tx'd    # Breast Cancer - in remission  Dr. Tang ([phone]) for Heme  f/u as outpt (they can eval CT finding in rt breat)    # HTN   c/w losartan 100mg and procardia xl 60mg q24    # HLD  c/w atorvastatin 80    # Depression and anxiety   c/w wellbutrin xL 300mg PO daily     # Fibromyalgia  c/w duloxetine 60mg PO daily   c/w cymbalta 60 q24  c/w elavil 25mg hs  c/w xanax 0.25mg hs prn    # DVT ppx: lovenox    dispo: w/u as above; rifaximin trial; stool w/u, then imodium; adv diet; off IVFs; cbc arlet;   might send home tomorrow if doing well

## 2021-12-30 NOTE — PROGRESS NOTE ADULT - ASSESSMENT
medical attendings note appreciated   work up noted   cnt managment as per med team    gabriela sethi MD

## 2021-12-31 LAB
ALBUMIN SERPL ELPH-MCNC: 4.2 G/DL — SIGNIFICANT CHANGE UP (ref 3.5–5.2)
ALP SERPL-CCNC: 62 U/L — SIGNIFICANT CHANGE UP (ref 30–115)
ALT FLD-CCNC: 11 U/L — SIGNIFICANT CHANGE UP (ref 0–41)
ANION GAP SERPL CALC-SCNC: 18 MMOL/L — HIGH (ref 7–14)
AST SERPL-CCNC: 11 U/L — SIGNIFICANT CHANGE UP (ref 0–41)
BILIRUB SERPL-MCNC: 0.8 MG/DL — SIGNIFICANT CHANGE UP (ref 0.2–1.2)
BUN SERPL-MCNC: 9 MG/DL — LOW (ref 10–20)
CALCIUM SERPL-MCNC: 9.3 MG/DL — SIGNIFICANT CHANGE UP (ref 8.5–10.1)
CHLORIDE SERPL-SCNC: 105 MMOL/L — SIGNIFICANT CHANGE UP (ref 98–110)
CHOLEST SERPL-MCNC: 213 MG/DL — HIGH
CMV IGG FLD QL: <0.2 U/ML — SIGNIFICANT CHANGE UP
CMV IGG SERPL-IMP: NEGATIVE — SIGNIFICANT CHANGE UP
CMV IGM FLD-ACNC: <8 AU/ML — SIGNIFICANT CHANGE UP
CMV IGM SERPL QL: NEGATIVE — SIGNIFICANT CHANGE UP
CO2 SERPL-SCNC: 20 MMOL/L — SIGNIFICANT CHANGE UP (ref 17–32)
CREAT SERPL-MCNC: 0.8 MG/DL — SIGNIFICANT CHANGE UP (ref 0.7–1.5)
FERRITIN SERPL-MCNC: 292 NG/ML — HIGH (ref 15–150)
FOLATE SERPL-MCNC: 10.7 NG/ML — SIGNIFICANT CHANGE UP
FOLATE SERPL-MCNC: 9.1 NG/ML — SIGNIFICANT CHANGE UP
GLUCOSE SERPL-MCNC: 126 MG/DL — HIGH (ref 70–99)
HCT VFR BLD CALC: 39.3 % — SIGNIFICANT CHANGE UP (ref 37–47)
HDLC SERPL-MCNC: 53 MG/DL — SIGNIFICANT CHANGE UP
HGB BLD-MCNC: 13.1 G/DL — SIGNIFICANT CHANGE UP (ref 12–16)
LIPID PNL WITH DIRECT LDL SERPL: 120 MG/DL — HIGH
MAGNESIUM SERPL-MCNC: 1.6 MG/DL — LOW (ref 1.8–2.4)
MCHC RBC-ENTMCNC: 30.7 PG — SIGNIFICANT CHANGE UP (ref 27–31)
MCHC RBC-ENTMCNC: 33.3 G/DL — SIGNIFICANT CHANGE UP (ref 32–37)
MCV RBC AUTO: 92 FL — SIGNIFICANT CHANGE UP (ref 81–99)
NON HDL CHOLESTEROL: 160 MG/DL — HIGH
NRBC # BLD: 0 /100 WBCS — SIGNIFICANT CHANGE UP (ref 0–0)
PLATELET # BLD AUTO: 291 K/UL — SIGNIFICANT CHANGE UP (ref 130–400)
POTASSIUM SERPL-MCNC: 3.8 MMOL/L — SIGNIFICANT CHANGE UP (ref 3.5–5)
POTASSIUM SERPL-SCNC: 3.8 MMOL/L — SIGNIFICANT CHANGE UP (ref 3.5–5)
PROT SERPL-MCNC: 6.4 G/DL — SIGNIFICANT CHANGE UP (ref 6–8)
RBC # BLD: 4.27 M/UL — SIGNIFICANT CHANGE UP (ref 4.2–5.4)
RBC # FLD: 14.5 % — SIGNIFICANT CHANGE UP (ref 11.5–14.5)
SODIUM SERPL-SCNC: 143 MMOL/L — SIGNIFICANT CHANGE UP (ref 135–146)
TRIGL SERPL-MCNC: 196 MG/DL — HIGH
VIT B12 SERPL-MCNC: 1194 PG/ML — SIGNIFICANT CHANGE UP (ref 232–1245)
VIT B12 SERPL-MCNC: 1256 PG/ML — HIGH (ref 232–1245)
WBC # BLD: 3.67 K/UL — LOW (ref 4.8–10.8)
WBC # FLD AUTO: 3.67 K/UL — LOW (ref 4.8–10.8)

## 2021-12-31 PROCEDURE — 99232 SBSQ HOSP IP/OBS MODERATE 35: CPT

## 2021-12-31 RX ORDER — MAGNESIUM SULFATE 500 MG/ML
2 VIAL (ML) INJECTION ONCE
Refills: 0 | Status: COMPLETED | OUTPATIENT
Start: 2021-12-31 | End: 2021-12-31

## 2021-12-31 RX ADMIN — Medication 0.25 MILLIGRAM(S): at 21:34

## 2021-12-31 RX ADMIN — Medication 650 MILLIGRAM(S): at 12:09

## 2021-12-31 RX ADMIN — Medication 150 GRAM(S): at 12:09

## 2021-12-31 RX ADMIN — BUPROPION HYDROCHLORIDE 300 MILLIGRAM(S): 150 TABLET, EXTENDED RELEASE ORAL at 11:33

## 2021-12-31 RX ADMIN — ATORVASTATIN CALCIUM 80 MILLIGRAM(S): 80 TABLET, FILM COATED ORAL at 21:34

## 2021-12-31 RX ADMIN — LETROZOLE 2.5 MILLIGRAM(S): 2.5 TABLET, FILM COATED ORAL at 12:25

## 2021-12-31 RX ADMIN — Medication 650 MILLIGRAM(S): at 05:35

## 2021-12-31 RX ADMIN — Medication 650 MILLIGRAM(S): at 21:34

## 2021-12-31 RX ADMIN — PANTOPRAZOLE SODIUM 40 MILLIGRAM(S): 20 TABLET, DELAYED RELEASE ORAL at 05:36

## 2021-12-31 RX ADMIN — Medication 650 MILLIGRAM(S): at 12:45

## 2021-12-31 RX ADMIN — DULOXETINE HYDROCHLORIDE 60 MILLIGRAM(S): 30 CAPSULE, DELAYED RELEASE ORAL at 11:33

## 2021-12-31 RX ADMIN — Medication 25 MILLIGRAM(S): at 21:33

## 2021-12-31 RX ADMIN — LOSARTAN POTASSIUM 100 MILLIGRAM(S): 100 TABLET, FILM COATED ORAL at 05:36

## 2021-12-31 RX ADMIN — Medication 650 MILLIGRAM(S): at 22:00

## 2021-12-31 RX ADMIN — Medication 60 MILLIGRAM(S): at 05:36

## 2021-12-31 RX ADMIN — ENOXAPARIN SODIUM 40 MILLIGRAM(S): 100 INJECTION SUBCUTANEOUS at 11:33

## 2021-12-31 NOTE — PROGRESS NOTE ADULT - ASSESSMENT
HOSPITAL COURSE:    Patient is a 71 year old female with PMH of IBS, pancreatitis, Breast Ca s/p radiation on Letrozole presents for vomiting and diarrhea x 1 month.     ASSESSMENT & PLAN:      #Diarrhea possibly 2/2 IBS, r/o infectious causes  #HAGMA likely secondary to lactic acidosis and hypokalemia 2/2 diarrhea ---> Resolved   - recently admitted 5x at various hospitals for similar complaints; extensive workup at [facility] thus unremarkable   - CT AP with IV and PO contrast unrevealing on this visit   - f/u repeat c. diff and GI PCR this admission  - GI - recommendations: no need for scope at this time since pt had recent EGD in 12/2021  - ESR 54, CRP 76 both elevated   - Advanced to Low Fiber/Low Residue/Lactose Free diet on 12/30   - zofran and carafate and PPI for symptomatic management; immodium for diarrhea prn   - Started Rifaxamin 12/30, 550mg Q8 (recalcitrant UC dosing)   - f/u erlichia and anaplasma pcr; f/u CMV IgG and IgM   - f/u folate and B12   - f/u SHARLENE   - f/u ferritin and TG to r/o LHL     #Recent Severe candida esophagitis - Resolved   - EGD: 12/3 -> severe candida esophagitis (confirmed on biopsy)  - s/p finished course of diflucan  - HIV non reactive   - Hep C negative  - hep B negative  - GI recc appreciated OP follow up, no need for endoscopy at this time    #Breast Cancer  - s/p surgery and radiation  - c/w maintenance Letrozole    #Leukopenia  - DDx: HLH vs. Erlichiosis vs. Underlying Hematological malignancy (most likely causes are viral)   - neutropenia and leukopenia - cyclic; normal Plts and Hb this visit, low WBC   - heme/onc eval for severe neutropenia () (follows with Dr. Guzman)   - Dr. Tang ([phone]) for Heme  - per heme onc- outpatient BM biopsy; Zarxio if febrile this visit     #HTN   - c/w losartan 100mg and procardia    #HLD  - continue with atorvastatin while inpatient     #Depression  - continue with wellbutrin xL 300mg PO daily     #Fibromyalgia  - continue with duloxetine 60mg PO daily     #Misc   - DVT ppx: lovenox  - GI ppx: protonix   - Diet: clears  - dispo: acute, pending CT scan

## 2021-12-31 NOTE — PROGRESS NOTE ADULT - SUBJECTIVE AND OBJECTIVE BOX
MANDY THOMPSON 71y Female  MRN#: [id number]   Hospital Day: 3d    SUBJECTIVE  Patient is a 71y old Female who presents with a chief complaint of diarrhea (30 Dec 2021 18:22)  Currently admitted to medicine with the primary diagnosis of Neutropenia      INTERVAL HPI AND OVERNIGHT EVENTS:  Patient was examined and seen at bedside. This morning she is resting comfortably in bed and reports no issues or overnight events. Denies current nausea, vomiting, or diarrhea.     OBJECTIVE  PAST MEDICAL & SURGICAL HISTORY  Pancreatitis, gallstone    Breast cancer  RT    Hypertension    Shingles rash    History of major abdominal surgery    History of seizures as a child    GERD (gastroesophageal reflux disease)     delivery delivered  x2    Abnormal cells in breast  RT breast bumpectomy      ALLERGIES:  penicillin (Unknown)    MEDICATIONS:  STANDING MEDICATIONS  amitriptyline 25 milliGRAM(s) Oral at bedtime  atorvastatin 80 milliGRAM(s) Oral at bedtime  buPROPion XL (24-Hour) . 300 milliGRAM(s) Oral daily  DULoxetine 60 milliGRAM(s) Oral daily  enoxaparin Injectable 40 milliGRAM(s) SubCutaneous daily  letrozole 2.5 milliGRAM(s) Oral daily  losartan 100 milliGRAM(s) Oral daily  NIFEdipine XL 60 milliGRAM(s) Oral daily  pantoprazole    Tablet 40 milliGRAM(s) Oral before breakfast  rifAXIMin 550 milliGRAM(s) Oral every 8 hours    PRN MEDICATIONS  acetaminophen     Tablet .. 650 milliGRAM(s) Oral every 6 hours PRN  ALPRAZolam 0.25 milliGRAM(s) Oral at bedtime PRN  ondansetron Injectable 4 milliGRAM(s) IV Push every 6 hours PRN      PHYSICAL EXAM:  Constitutional: pt is comfortable sitting in bed; no acute distress; well-groomed  HEENT: Full ROM in bilateral eyes; pupils symmetrical and equal in size; neck supple  Respiratory: (+) sounds in all lung fields; no crackles or wheezes appreciated  Cardiovascular: S1 S2 regular rate and rhythm; no murmurs or gallops appreciated  Gastrointestinal: (+) bowel sounds in all quadrants; abdomen is non-distended, non-tender to superficial and deep palpation  Extremities: extremities are non-edematous  Vascular: Warm and Well perfused UE and LE; no mottling or dusky skin   Neurological: AxO x3 to self, place and year  Skin: no raches or ulcerations noted; no scaling present      VITAL SIGNS: Last 24 Hours  T(C): 36.4 (31 Dec 2021 04:59), Max: 36.4 (31 Dec 2021 04:59)  T(F): 97.6 (31 Dec 2021 04:59), Max: 97.6 (31 Dec 2021 04:59)  HR: 71 (31 Dec 2021 04:59) (69 - 72)  BP: 130/85 (31 Dec 2021 04:59) (127/72 - 130/85)  BP(mean): --  RR: 18 (31 Dec 2021 04:59) (18 - 18)  SpO2: --    LABS:                        13.1   3.67  )-----------( 291      ( 31 Dec 2021 08:53 )             39.3         143  |  105  |  9<L>  ----------------------------<  126<H>  3.8   |  20  |  0.8    Ca    9.3      31 Dec 2021 08:53  Mg     1.6         TPro  6.4  /  Alb  4.2  /  TBili  0.8  /  DBili  x   /  AST  11  /  ALT  11  /  AlkPhos  62

## 2021-12-31 NOTE — PROGRESS NOTE ADULT - ASSESSMENT
71 year old woman with PMH of IBS, pancreatitis, Breast Ca s/p radiation on Letrozole presents for vomiting and diarrhea x 1.5 months on/off.     # N/V & Diarrhea of unclear etiology; hx IBS; high gap metab acid - resolving; hypokalemia 2/2 diarrhea; on/off neutropenia (can be profound)  recently admitted 5x at various hospitals for similar complaints; extensive workup at [facility] thus unremarkable   CT AP with IV and PO contrast unrevealing on this visit   GI work up all negative, Stool pcr negative, cdiff negative in previous admission  flow cyto serum at [facility] is neg  HIV neg; Hep neg; EBV neg; TSH nl  repeat c. diff and GI PCR never sent during this admission - and pt w/out BM x 4days (prob should just cancel orders)  OK to use imodium if develops diarrhea (C Diff neg mult times in Dec)  ESR 54; CRP 76; B12 nl; Fol nl;   obtain SHARLENE  obtain Ehrlichia/anaplasma PCR and Ab; CMV IgG & IgM;  obtain Ferritin;  (r/o HLH) - highly unlikely (no fever)  trial rifaximin 550mg po q8 x10 days (for traveler's diarrhea and IBS - diarrhea predom)  diet: DASH; low resid; lactose free  zofran prn  PPI po q24  d/c carafate - pt does not like it  IVFs: none  pain: none  h/o should consider BM bx (inpt vs outpt) - pt says she has appt on MON for outpt h/o f/u  f/u GI    # doubt cyclic neutropenia  this is very rare dx  need to see cycles q21 days or so, which is NOT in her hx    # hypomagnesemia  Mg rider today x1    # Recent severe candida esophagitis (prob from severe neutropenia)  EGD: 12/3 -> severe candida esophagitis (confirmed on biopsy)  s/p finished 21-day course of diflucan    # hx of recurrent HSV  had recent bx proven episode at [facility] and was tx'd  pt does NOT take antivirals all the time (just for flares)    # Breast Cancer - in remission  Dr. Tang ([phone]) for Heme  f/u as outpt (they can eval CT finding in rt breat)    # HTN   c/w losartan 100mg and procardia xl 60mg q24    # HLD  c/w atorvastatin 80    # Depression and anxiety   c/w wellbutrin xL 300mg PO daily     # Fibromyalgia  c/w duloxetine 60mg PO daily   c/w cymbalta 60 q24  c/w elavil 25mg hs  c/w xanax 0.25mg hs prn    # DVT ppx: lovenox    dispo: serum w/u as above; rifaximin trial (need to price at her Pharm ([facility] [employer] [facility]); cancel stool w/u; imodium prn; adv diet; cbc arlet;   might send home tomorrow if doing well (anticipate)

## 2021-12-31 NOTE — PROGRESS NOTE ADULT - SUBJECTIVE AND OBJECTIVE BOX
MANDY THOMPSON  71y  Female  ***My note supersedes ALL resident notes that I sign.  My corrections for their notes are in my note.***    I can be reached directly on [id number]. My office number is [phone]. My personal cell number is [phone].    INTERVAL EVENTS: Here for f/u of N/V. This seems to be gone for 2 days now. Also, no BM for 4 days, which means diarrhea is now gone. These both got better prior to rifaximin being started. Rifaximin is to try to prevent recurrence. Pt is osvaldo diet. Pt is afraid to go home. Pt has been woken up by N/V at home, so not occurring soley because of anxiety. Pt denies pain.    T(F): 98.8 (12-31-21 @ 12:12), Max: 98.8 (12-31-21 @ 12:12)  HR: 73 (12-31-21 @ 12:12) (71 - 73)  BP: 129/82 (12-31-21 @ 12:12) (127/72 - 130/85)  RR: 19 (12-31-21 @ 12:12) (18 - 19)  SpO2: --    Gen: NAD  HEENT: PERRL, EOMI, mouth clr, nose clr  Neck: no nodes, no JVD, thyroid nl  lungs: clr  hrt: s1 s2 rrr no murmur  abd: soft, NT/ND, no HS megaly  ext: no edema, no c/c  neuro: aa ox3, cn intact, can move all 4 ext    LABS:                      13.1    (    92.0   3.67  )-----------( ---------      291      ( 31 Dec 2021 08:53 )             39.3    (    14.5     WBC Count: 3.67 K/uL (12-31-21 @ 08:53) - stable  WBC Count: 3.41 K/uL (12-30-21 @ 04:30)  WBC Count: 3.58 K/uL (12-29-21 @ 04:30)  WBC Count: 3.58 K/uL (12-28-21 @ 06:32)  WBC Count: 1.52 K/uL (12-27-21 @ 21:31)    143   (   105   (   126      12-31-21 @ 08:53  ----------------------               3.8   (   20   (   9                             -----                        0.8  Ca  9.3   Mg  1.6    P   --     --   (   --   (   --      12-30-21 @ 16:03  ----------------------               --   (   --   (   --                             -----                        --  Ca  --   Mg  1.8    P   --     LFT  6.4  (  0.8  (  11       12-31-21 @ 08:53  -------------------------  4.2  (  62  (  11    RADIOLOGY & ADDITIONAL TESTS:    MEDICATIONS:  rifAXIMin 550 milliGRAM(s) Oral every 8 hours    acetaminophen     Tablet .. 650 milliGRAM(s) Oral every 6 hours PRN  ALPRAZolam 0.25 milliGRAM(s) Oral at bedtime PRN  amitriptyline 25 milliGRAM(s) Oral at bedtime  atorvastatin 80 milliGRAM(s) Oral at bedtime  buPROPion XL (24-Hour) . 300 milliGRAM(s) Oral daily  DULoxetine 60 milliGRAM(s) Oral daily  enoxaparin Injectable 40 milliGRAM(s) SubCutaneous daily  letrozole 2.5 milliGRAM(s) Oral daily  losartan 100 milliGRAM(s) Oral daily  NIFEdipine XL 60 milliGRAM(s) Oral daily  ondansetron Injectable 4 milliGRAM(s) IV Push every 6 hours PRN  pantoprazole    Tablet 40 milliGRAM(s) Oral before breakfast

## 2022-01-01 LAB
ALBUMIN SERPL ELPH-MCNC: 3.9 G/DL — SIGNIFICANT CHANGE UP (ref 3.5–5.2)
ALP SERPL-CCNC: 55 U/L — SIGNIFICANT CHANGE UP (ref 30–115)
ALT FLD-CCNC: 10 U/L — SIGNIFICANT CHANGE UP (ref 0–41)
ANION GAP SERPL CALC-SCNC: 16 MMOL/L — HIGH (ref 7–14)
AST SERPL-CCNC: 11 U/L — SIGNIFICANT CHANGE UP (ref 0–41)
BASOPHILS # BLD AUTO: 0.03 K/UL — SIGNIFICANT CHANGE UP (ref 0–0.2)
BASOPHILS NFR BLD AUTO: 0.7 % — SIGNIFICANT CHANGE UP (ref 0–1)
BILIRUB SERPL-MCNC: 0.6 MG/DL — SIGNIFICANT CHANGE UP (ref 0.2–1.2)
BUN SERPL-MCNC: 9 MG/DL — LOW (ref 10–20)
CALCIUM SERPL-MCNC: 9.4 MG/DL — SIGNIFICANT CHANGE UP (ref 8.5–10.1)
CHLORIDE SERPL-SCNC: 105 MMOL/L — SIGNIFICANT CHANGE UP (ref 98–110)
CMV IGG FLD QL: <0.2 U/ML — SIGNIFICANT CHANGE UP
CMV IGG SERPL-IMP: NEGATIVE — SIGNIFICANT CHANGE UP
CMV IGM FLD-ACNC: <8 AU/ML — SIGNIFICANT CHANGE UP
CMV IGM SERPL QL: NEGATIVE — SIGNIFICANT CHANGE UP
CO2 SERPL-SCNC: 21 MMOL/L — SIGNIFICANT CHANGE UP (ref 17–32)
CREAT SERPL-MCNC: 0.7 MG/DL — SIGNIFICANT CHANGE UP (ref 0.7–1.5)
EOSINOPHIL # BLD AUTO: 0.15 K/UL — SIGNIFICANT CHANGE UP (ref 0–0.7)
EOSINOPHIL NFR BLD AUTO: 3.6 % — SIGNIFICANT CHANGE UP (ref 0–8)
GLUCOSE SERPL-MCNC: 93 MG/DL — SIGNIFICANT CHANGE UP (ref 70–99)
HCT VFR BLD CALC: 38.1 % — SIGNIFICANT CHANGE UP (ref 37–47)
HGB BLD-MCNC: 12.6 G/DL — SIGNIFICANT CHANGE UP (ref 12–16)
IMM GRANULOCYTES NFR BLD AUTO: 1 % — HIGH (ref 0.1–0.3)
LYMPHOCYTES # BLD AUTO: 1.82 K/UL — SIGNIFICANT CHANGE UP (ref 1.2–3.4)
LYMPHOCYTES # BLD AUTO: 44.1 % — SIGNIFICANT CHANGE UP (ref 20.5–51.1)
MAGNESIUM SERPL-MCNC: 1.8 MG/DL — SIGNIFICANT CHANGE UP (ref 1.8–2.4)
MCHC RBC-ENTMCNC: 31 PG — SIGNIFICANT CHANGE UP (ref 27–31)
MCHC RBC-ENTMCNC: 33.1 G/DL — SIGNIFICANT CHANGE UP (ref 32–37)
MCV RBC AUTO: 93.6 FL — SIGNIFICANT CHANGE UP (ref 81–99)
MONOCYTES # BLD AUTO: 0.25 K/UL — SIGNIFICANT CHANGE UP (ref 0.1–0.6)
MONOCYTES NFR BLD AUTO: 6.1 % — SIGNIFICANT CHANGE UP (ref 1.7–9.3)
NEUTROPHILS # BLD AUTO: 1.84 K/UL — SIGNIFICANT CHANGE UP (ref 1.4–6.5)
NEUTROPHILS NFR BLD AUTO: 44.5 % — SIGNIFICANT CHANGE UP (ref 42.2–75.2)
NRBC # BLD: 0 /100 WBCS — SIGNIFICANT CHANGE UP (ref 0–0)
PLATELET # BLD AUTO: 283 K/UL — SIGNIFICANT CHANGE UP (ref 130–400)
POTASSIUM SERPL-MCNC: 4.5 MMOL/L — SIGNIFICANT CHANGE UP (ref 3.5–5)
POTASSIUM SERPL-SCNC: 4.5 MMOL/L — SIGNIFICANT CHANGE UP (ref 3.5–5)
PROT SERPL-MCNC: 6.1 G/DL — SIGNIFICANT CHANGE UP (ref 6–8)
RBC # BLD: 4.07 M/UL — LOW (ref 4.2–5.4)
RBC # FLD: 14.4 % — SIGNIFICANT CHANGE UP (ref 11.5–14.5)
SODIUM SERPL-SCNC: 142 MMOL/L — SIGNIFICANT CHANGE UP (ref 135–146)
WBC # BLD: 4.13 K/UL — LOW (ref 4.8–10.8)
WBC # FLD AUTO: 4.13 K/UL — LOW (ref 4.8–10.8)

## 2022-01-01 PROCEDURE — 99232 SBSQ HOSP IP/OBS MODERATE 35: CPT

## 2022-01-01 RX ORDER — MAGNESIUM OXIDE 400 MG ORAL TABLET 241.3 MG
400 TABLET ORAL ONCE
Refills: 0 | Status: COMPLETED | OUTPATIENT
Start: 2022-01-01 | End: 2022-01-01

## 2022-01-01 RX ADMIN — ATORVASTATIN CALCIUM 80 MILLIGRAM(S): 80 TABLET, FILM COATED ORAL at 21:32

## 2022-01-01 RX ADMIN — ENOXAPARIN SODIUM 40 MILLIGRAM(S): 100 INJECTION SUBCUTANEOUS at 11:46

## 2022-01-01 RX ADMIN — Medication 60 MILLIGRAM(S): at 05:23

## 2022-01-01 RX ADMIN — LOSARTAN POTASSIUM 100 MILLIGRAM(S): 100 TABLET, FILM COATED ORAL at 05:23

## 2022-01-01 RX ADMIN — Medication 650 MILLIGRAM(S): at 14:02

## 2022-01-01 RX ADMIN — DULOXETINE HYDROCHLORIDE 60 MILLIGRAM(S): 30 CAPSULE, DELAYED RELEASE ORAL at 11:39

## 2022-01-01 RX ADMIN — MAGNESIUM OXIDE 400 MG ORAL TABLET 400 MILLIGRAM(S): 241.3 TABLET ORAL at 14:02

## 2022-01-01 RX ADMIN — Medication 0.25 MILLIGRAM(S): at 21:33

## 2022-01-01 RX ADMIN — LETROZOLE 2.5 MILLIGRAM(S): 2.5 TABLET, FILM COATED ORAL at 11:39

## 2022-01-01 RX ADMIN — BUPROPION HYDROCHLORIDE 300 MILLIGRAM(S): 150 TABLET, EXTENDED RELEASE ORAL at 11:46

## 2022-01-01 RX ADMIN — PANTOPRAZOLE SODIUM 40 MILLIGRAM(S): 20 TABLET, DELAYED RELEASE ORAL at 06:33

## 2022-01-01 RX ADMIN — Medication 5 MILLIGRAM(S): at 17:06

## 2022-01-01 RX ADMIN — Medication 25 MILLIGRAM(S): at 21:32

## 2022-01-01 NOTE — PROGRESS NOTE ADULT - ASSESSMENT
71 year old woman with PMH of IBS, pancreatitis, Breast Ca s/p radiation on Letrozole presents for vomiting and diarrhea x 1.5 months on/off.     # N/V & Diarrhea of unclear etiology - now all resolved; hx IBS; high gap metab acid - resolved; hypokalemia 2/2 diarrhea - resolved; on/off neutropenia (was profound)  recently admitted 5x at various hospitals for similar complaints; extensive workup at [facility] thus unremarkable   CT AP with IV and PO contrast unrevealing on this visit   GI work up all negative, Stool pcr negative, cdiff negative in previous admission  flow cyto serum at [facility] is neg  HIV neg; Hep neg; EBV neg; TSH nl; CMV IGG/M neg;  repeat c. diff and GI PCR never sent during this admission - and pt w/out BM x 4days (just cancel orders)  OK to use imodium if develops diarrhea (C Diff neg mult times in Dec)  ESR 54; CRP 76; B12 nl; Fol nl;   Ferritin 292;  HLH - highly unlikely (no fever) - ruled OUT  f/u ELIS  f/u Ehrlichia/anaplasma PCR and Ab  trial rifaximin 550mg po q8 x10 days (for traveler's diarrhea and IBS - diarrhea predom) - only $4/00 copay  diet: DASH; low resid; lactose free  zofran prn - can use ODT tab at home  PPI po q24  d/c carafate - pt does not like it  IVFs: none  pain: none  h/o should consider BM bx (outpt) - pt says she has appt on MON for outpt h/o f/u  f/u GI    # constipation  Dulcolax 5mg tab po q4 hrs x2 doses  if no BM after 2 doses, try lactulose 30cc q3 hrs x 3 doses    # doubt cyclic neutropenia  this is very rare dx  need to see cycles q21 days or so, which is NOT in her hx    # hypomagnesemia - better  s/p Mg rider  give Mg ox 400mg po x1 today    # Recent severe candida esophagitis (prob from severe neutropenia)  EGD: 12/3 -> severe candida esophagitis (confirmed on biopsy)  s/p finished 21-day course of diflucan    # hx of recurrent HSV  had recent bx proven episode at [facility] and was tx'd  pt does NOT take antivirals all the time (just for flares)    # Breast Cancer - in remission  Dr. Tang ([phone]) for Heme  f/u as outpt (they can eval CT finding in rt breat)    # HTN   c/w losartan 100mg and procardia xl 60mg q24    # HLD  c/w atorvastatin 80    # Depression and anxiety   c/w wellbutrin xL 300mg PO daily     # Fibromyalgia  c/w duloxetine 60mg PO daily   c/w cymbalta 60 q24  c/w elavil 25mg hs  c/w xanax 0.25mg hs prn    # DVT ppx: lovenox    dispo: f/u elis/ehrlichia; tx constipation; c/w rifaximin trial; cancel stool w/u; cbc arlet; anticipate d/c home arlet  her Pharm ([facility] [employer]);

## 2022-01-01 NOTE — PROGRESS NOTE ADULT - SUBJECTIVE AND OBJECTIVE BOX
MANDY THOMPSON  71y  Female  ***My note supersedes ALL resident notes that I sign.  My corrections for their notes are in my note.***    I can be reached directly on [employer]. My office number is [phone]. My personal cell number is [phone].    INTERVAL EVENTS: Here for f/u of abd pain. Pt has not had BM for 5 days. Pt has no N/V and is osvaldo all food/drink. However, pt is still reluctant to go home. Says her dtrs will not pick her up and they want her to stay until arlet, so that she can make her h/o appt on MON. Pt wants to have meds for BM before she considers going home. Pt was told she is at risk for catching COVID in hospital - she is aware. I told pt she should go home today and that we could discuss that option later today.    T(F): 96.4 (01-01-22 @ 04:50), Max: 98.8 (12-31-21 @ 12:12)  HR: 64 (01-01-22 @ 04:50) (64 - 73)  BP: 147/84 (01-01-22 @ 04:50) (129/82 - 147/84)  RR: 19 (01-01-22 @ 04:50) (19 - 19)  SpO2: --    Gen: NAD  HEENT: PERRL, EOMI, mouth clr, nose clr  Neck: no nodes, no JVD, thyroid nl  lungs: clr  hrt: s1 s2 rrr no murmur  abd: soft, NT/ND, no HS megaly  ext: no edema, no c/c  neuro: aa ox3, cn intact, can move all 4 ext    LABS:                     12.6    (    93.6   4.13  )-----------( ---------      283      ( 01 Jan 2022 07:31 )             38.1    (    14.4     142   (   105   (   93      01-01-22 @ 07:31  ----------------------               4.5   (   21   (   9                             -----                        0.7  Ca  9.4   Mg  1.8    P   --     LFT  6.1  (  0.6  (  11       01-01-22 @ 07:31  -------------------------  3.9  (  55  (  10    RADIOLOGY & ADDITIONAL TESTS:      MEDICATIONS:  rifAXIMin 550 milliGRAM(s) Oral every 8 hours    acetaminophen     Tablet .. 650 milliGRAM(s) Oral every 6 hours PRN  ALPRAZolam 0.25 milliGRAM(s) Oral at bedtime PRN  amitriptyline 25 milliGRAM(s) Oral at bedtime  atorvastatin 80 milliGRAM(s) Oral at bedtime  buPROPion XL (24-Hour) . 300 milliGRAM(s) Oral daily  DULoxetine 60 milliGRAM(s) Oral daily  enoxaparin Injectable 40 milliGRAM(s) SubCutaneous daily  letrozole 2.5 milliGRAM(s) Oral daily  losartan 100 milliGRAM(s) Oral daily  NIFEdipine XL 60 milliGRAM(s) Oral daily  ondansetron Injectable 4 milliGRAM(s) IV Push every 6 hours PRN  pantoprazole    Tablet 40 milliGRAM(s) Oral before breakfast

## 2022-01-02 ENCOUNTER — TRANSCRIPTION ENCOUNTER (OUTPATIENT)
Age: 72
End: 2022-01-02

## 2022-01-02 VITALS
RESPIRATION RATE: 18 BRPM | HEART RATE: 77 BPM | SYSTOLIC BLOOD PRESSURE: 161 MMHG | DIASTOLIC BLOOD PRESSURE: 97 MMHG | TEMPERATURE: 97 F

## 2022-01-02 LAB
BASOPHILS # BLD AUTO: 0.05 K/UL — SIGNIFICANT CHANGE UP (ref 0–0.2)
BASOPHILS NFR BLD AUTO: 1 % — SIGNIFICANT CHANGE UP (ref 0–1)
EOSINOPHIL # BLD AUTO: 0.15 K/UL — SIGNIFICANT CHANGE UP (ref 0–0.7)
EOSINOPHIL NFR BLD AUTO: 3.1 % — SIGNIFICANT CHANGE UP (ref 0–8)
HCT VFR BLD CALC: 39.1 % — SIGNIFICANT CHANGE UP (ref 37–47)
HGB BLD-MCNC: 13 G/DL — SIGNIFICANT CHANGE UP (ref 12–16)
IMM GRANULOCYTES NFR BLD AUTO: 1.2 % — HIGH (ref 0.1–0.3)
LYMPHOCYTES # BLD AUTO: 1.62 K/UL — SIGNIFICANT CHANGE UP (ref 1.2–3.4)
LYMPHOCYTES # BLD AUTO: 33.7 % — SIGNIFICANT CHANGE UP (ref 20.5–51.1)
MCHC RBC-ENTMCNC: 30.7 PG — SIGNIFICANT CHANGE UP (ref 27–31)
MCHC RBC-ENTMCNC: 33.2 G/DL — SIGNIFICANT CHANGE UP (ref 32–37)
MCV RBC AUTO: 92.2 FL — SIGNIFICANT CHANGE UP (ref 81–99)
MONOCYTES # BLD AUTO: 0.28 K/UL — SIGNIFICANT CHANGE UP (ref 0.1–0.6)
MONOCYTES NFR BLD AUTO: 5.8 % — SIGNIFICANT CHANGE UP (ref 1.7–9.3)
NEUTROPHILS # BLD AUTO: 2.65 K/UL — SIGNIFICANT CHANGE UP (ref 1.4–6.5)
NEUTROPHILS NFR BLD AUTO: 55.2 % — SIGNIFICANT CHANGE UP (ref 42.2–75.2)
NRBC # BLD: 0 /100 WBCS — SIGNIFICANT CHANGE UP (ref 0–0)
PLATELET # BLD AUTO: 295 K/UL — SIGNIFICANT CHANGE UP (ref 130–400)
RBC # BLD: 4.24 M/UL — SIGNIFICANT CHANGE UP (ref 4.2–5.4)
RBC # FLD: 14.5 % — SIGNIFICANT CHANGE UP (ref 11.5–14.5)
WBC # BLD: 4.81 K/UL — SIGNIFICANT CHANGE UP (ref 4.8–10.8)
WBC # FLD AUTO: 4.81 K/UL — SIGNIFICANT CHANGE UP (ref 4.8–10.8)

## 2022-01-02 PROCEDURE — 99239 HOSP IP/OBS DSCHRG MGMT >30: CPT

## 2022-01-02 RX ADMIN — Medication 650 MILLIGRAM(S): at 16:12

## 2022-01-02 RX ADMIN — LOSARTAN POTASSIUM 100 MILLIGRAM(S): 100 TABLET, FILM COATED ORAL at 05:05

## 2022-01-02 RX ADMIN — BUPROPION HYDROCHLORIDE 300 MILLIGRAM(S): 150 TABLET, EXTENDED RELEASE ORAL at 11:17

## 2022-01-02 RX ADMIN — PANTOPRAZOLE SODIUM 40 MILLIGRAM(S): 20 TABLET, DELAYED RELEASE ORAL at 06:19

## 2022-01-02 RX ADMIN — LETROZOLE 2.5 MILLIGRAM(S): 2.5 TABLET, FILM COATED ORAL at 11:17

## 2022-01-02 RX ADMIN — Medication 650 MILLIGRAM(S): at 14:34

## 2022-01-02 RX ADMIN — DULOXETINE HYDROCHLORIDE 60 MILLIGRAM(S): 30 CAPSULE, DELAYED RELEASE ORAL at 11:18

## 2022-01-02 RX ADMIN — Medication 60 MILLIGRAM(S): at 05:05

## 2022-01-02 NOTE — DISCHARGE NOTE PROVIDER - CARE PROVIDER_API CALL
[id number] [place] [address]  Phone: [phone]  Fax: [phone]  Follow Up Time: 1 month    Shad Adorno)  Gastroenterology; Internal Medicine  [address] [place] [address]  Phone: [phone]  Fax: [phone]  Follow Up Time: 2 weeks    Kitty Jesus  Phone: [phone]  Fax: (   )    -  Follow Up Time: 2 weeks

## 2022-01-02 NOTE — DISCHARGE NOTE PROVIDER - HOSPITAL COURSE
Patient is a 71 year old female with PMH of IBS, pancreatitis, Breast Ca s/p radiation on Letrozole presents for vomiting and diarrhea x 1 month.     Pt has had recent EGD prior to admission which showed candida esophagitis, pt finished extensive course of diflucan.     Admitted this time for same, intermittent diarrhea and projectile vomiting. Extensive labwork and CT scans showed no cause, however bloodwork did show isolated, asymptomatic leukopenia. Nausea and vomiting likely linked to IBS, pt started on rifaxamin. Experienced no nausea or vomiting or diarrhea (had some constipation) during this admission. Was seen by hematologist Dr. Jesus, will have BM Biopsy outpatient to find cause of isolated leukopenia.     Pt medically cleared and agreeable to discharge on 1/2/2022.

## 2022-01-02 NOTE — PROGRESS NOTE ADULT - SUBJECTIVE AND OBJECTIVE BOX
pt feels better   was admitted with recurrent gi symptoms   nausea,vomiting and intermittent loose motions now resolved   no fever

## 2022-01-02 NOTE — CHART NOTE - NSCHARTNOTEFT_GEN_A_CORE
RN alerted intern that pt is c/o HA  Pt stated that she has been experiencing HA since she was in the ED  Pt now complain of worsening HA w/ Nausea and photosensitivity which was unresolved w/ Tylenol     Worsening of HA maybe contributed by Rifaximin  Administered Ibuprofen 600mg po x 1    Will sign out to primary team to f/u
<<<RESIDENT DISCHARGE NOTE>>>     MANDY THOMPSON  MRN-[id number]    VITAL SIGNS:  T(F): 97.1 (01-02-22 @ 05:00), Max: 98.4 (01-01-22 @ 13:38)  HR: 77 (01-02-22 @ 05:00)  BP: 161/97 (01-02-22 @ 05:00)  SpO2: --      PHYSICAL EXAMINATION:  General: no acute distress  Head & Neck: atraumatic, normocephalic   Pulmonary: clear breath sounds, no wheezing noted  Cardiovascular: S1 S2 regular rate and rhythm   Gastrointestinal/Abdomen & Pelvis: non-tender to palpation, soft, nondistended  Neurologic/Motor: AxO x3, ambulatory     TEST RESULTS:                        12.6   4.13  )-----------( 283      ( 01 Jan 2022 07:31 )             38.1       01-01    142  |  105  |  9<L>  ----------------------------<  93  4.5   |  21  |  0.7    Ca    9.4      01 Jan 2022 07:31  Mg     1.8     01-01    TPro  6.1  /  Alb  3.9  /  TBili  0.6  /  DBili  x   /  AST  11  /  ALT  10  /  AlkPhos  55  01-01      FINAL DISCHARGE INTERVIEW:  Resident(s) Present: Nereyda Dowd DO     DISCHARGE MEDICATION RECONCILIATION  reviewed with Attending Dr. Marques Booth DO     DISPOSITION:   [x ] Home

## 2022-01-02 NOTE — PROGRESS NOTE ADULT - PROVIDER SPECIALTY LIST ADULT
Internal Medicine
Gastroenterology
Heme/Onc
Internal Medicine
Heme/Onc
Internal Medicine

## 2022-01-02 NOTE — PROGRESS NOTE ADULT - ASSESSMENT
71 year old woman with PMH of IBS, pancreatitis, Breast Ca s/p radiation on Letrozole presents for vomiting and diarrhea x 1.5 months on/off.     # N/V & Diarrhea of unclear etiology - now all resolved; hx IBS; high gap metab acid - resolved; hypokalemia 2/2 diarrhea - resolved; on/off neutropenia (was profound)  recently admitted 5x at various hospitals for similar complaints; extensive workup at [facility] thus unremarkable   CT AP with IV and PO contrast unrevealing on this visit   GI work up all negative, Stool pcr negative, cdiff negative in previous admission  flow cyto serum at [facility] is neg  HIV neg; Hep neg; EBV neg; TSH nl; CMV IGG/M neg;  repeat c. diff and GI PCR never sent during this admission - and pt w/out BM x 4days (just cancel orders)  OK to use imodium if develops diarrhea (C Diff neg mult times in Dec)  ESR 54; CRP 76; B12 nl; Fol nl;   Ferritin 292;  HLH - highly unlikely (no fever) - ruled OUT  f/u ELIS  f/u Ehrlichia/anaplasma PCR and Ab  trial rifaximin 550mg po q8 x10 days (for traveler's diarrhea and IBS - diarrhea predom) - only $4/00 copay (need 7 more days)  pt takes glucosamine, tumeric and echinacea at home: I told her to stop all 3 until she was better  diet: DASH; low resid; lactose free  zofran prn - can use ODT tab at home  PPI po q24  d/c carafate - pt does not like it  IVFs: none  pain: none  h/o should consider BM bx (outpt) - pt says she has appt on MON for outpt h/o f/u  f/u GI as outpt    # constipation - resolved    # doubt cyclic neutropenia  this is very rare dx  need to see cycles q21 days or so, which is NOT in her hx    # hypomagnesemia - better    # Recent severe candida esophagitis (prob from severe neutropenia)  EGD: 12/3 -> severe candida esophagitis (confirmed on biopsy)  s/p finished 21-day course of diflucan; this is resolved now    # hx of recurrent HSV  had recent bx proven episode at [facility] and was tx'd  pt does NOT take antivirals all the time (just for flares)    # Breast Cancer - in remission  Dr. Tang ([phone]) for Heme  f/u as outpt (they can eval CT finding in rt breat)    # HTN   c/w losartan 100mg and procardia xl 60mg q24    # HLD  c/w atorvastatin 80    # Depression and anxiety   c/w wellbutrin xL 300mg PO daily     # Fibromyalgia  c/w duloxetine 60mg PO daily   c/w cymbalta 60 q24  c/w elavil 25mg hs  c/w xanax 0.25mg hs prn    # DVT ppx: lovenox    dispo: f/u elis/ehrlichia; c/w rifaximin trial; cancel stool w/u; d/c home today  her Pharm: ([facility]);

## 2022-01-02 NOTE — PROGRESS NOTE ADULT - ASSESSMENT
pt known to me for multiple admissions for same GI symptoms   started about 6 weeks ago    also at the same tome wbc goes down v low then comes up on its own   at this admission same history   now wbc normal and plt and hb also normal  candidiasis found by EGD completing diflucan   h,o breast cancer early stage on letrazole  which does not cause leukopenia  etiology not clear   ct chest and abdomen reviewed nothing significant   will consider bone marrow   pt will be seen tomorrow as out tpt       gabriela sethi MD

## 2022-01-02 NOTE — DISCHARGE NOTE PROVIDER - PROVIDER TOKENS
PROVIDER:[TOKEN:[[id number]],FOLLOWUP:[1 month]],PROVIDER:[TOKEN:[[id number]],FOLLOWUP:[2 weeks]],FREE:[LAST:[Shamim],FIRST:[Kitty],PHONE:[phone]],FAX:[(   )    -],FOLLOWUP:[2 weeks]]

## 2022-01-02 NOTE — DISCHARGE NOTE NURSING/CASE MANAGEMENT/SOCIAL WORK - NSDCPEFALRISK_GEN_ALL_CORE
For information on Fall & Injury Prevention, visit: [email or web address] OR  [email or web address] OR  https://www.cdc.gov/steadi/patient.html

## 2022-01-02 NOTE — DISCHARGE NOTE PROVIDER - NSDCFUADDINST_GEN_ALL_CORE_FT
Please take all medications as prescribed in these instructions. Be sure to followup with your primary care physician after discharge.     Please eat a diet low in salt to keep your kidneys healthy. Keep your carbohydrates/sweets consistent from day to day, in order to avoid large shifts in your blood sugar. Include a variety of fruits and vegetables in your diet.

## 2022-01-02 NOTE — DISCHARGE NOTE PROVIDER - NSDCCPCAREPLAN_GEN_ALL_CORE_FT
PRINCIPAL DISCHARGE DIAGNOSIS  Diagnosis: Neutropenia  Assessment and Plan of Treatment: On this visit, it was noted that your white blood cell count was very low. It is unknown why this is, as you have had no symptoms of infection. You were seen by a hematologist oncologist, who recommend that you follow up outpatient for a bone marrow biopsy.   Please follow up with Dr. Talley outpatient to have this procedure.      SECONDARY DISCHARGE DIAGNOSES  Diagnosis: Vomiting  Assessment and Plan of Treatment: You came to the hospital because of vomiting that has ocurred since November. We checked all of your lab work, and did multiple scans, but we could not find a reason for the vomiting. This is likely linked to the IBS you have had, and we started you on an antibiotic called Rifaxamin. Please take this once you are discharged.   Please also follow up with the gastroenterologist upon discharge.   Please take all medications as prescribed in these instructions. Be sure to followup with your primary care physician after discharge.

## 2022-01-02 NOTE — DISCHARGE NOTE PROVIDER - NSDCMRMEDTOKEN_GEN_ALL_CORE_FT
amitriptyline 25 mg oral tablet: 1 tab(s) orally once a day (at bedtime)  DULoxetine 60 mg oral delayed release capsule: 1 cap(s) orally once a day  Femara 2.5 mg oral tablet: 1 tab(s) orally once a day  losartan 100 mg oral tablet: 1 tab(s) orally once a day  NIFEdipine 60 mg oral tablet, extended release: 1 tab(s) orally once a day  omeprazole 40 mg oral delayed release capsule: 1 cap(s) orally once a day  rifAXIMin 550 mg oral tablet: 1 tab(s) orally every 8 hours  rosuvastatin 20 mg oral tablet: 1 tab(s) orally once a day  Wellbutrin  mg/24 hours oral tablet, extended release: 1 tab(s) orally every 24 hours  Xanax 0.25 mg oral tablet: 1 tab(s) orally once a day (at bedtime), As Needed

## 2022-01-02 NOTE — PROGRESS NOTE ADULT - SUBJECTIVE AND OBJECTIVE BOX
MANDY THOMPSON  71y  Female  ***My note supersedes ALL resident notes that I sign.  My corrections for their notes are in my note.***    I can be reached directly on [id number]. My office number is [phone]. My personal cell number is [phone].    INTERVAL EVENTS: Here for f/u of N/V. Pt had BM yesterday (some cramps w/ dulcolax, but it worked). Pt feels OK to go home today.    T(F): 97.1 (01-02-22 @ 05:00), Max: 98.4 (01-01-22 @ 13:38)  HR: 77 (01-02-22 @ 05:00) (74 - 83)  BP: 161/97 (01-02-22 @ 05:00) (130/87 - 161/97)  RR: 18 (01-02-22 @ 05:00) (18 - 20)  SpO2: --    Gen: NAD  HEENT: PERRL, EOMI, mouth clr, nose clr  Neck: no nodes, no JVD, thyroid nl  lungs: clr  hrt: s1 s2 rrr no murmur  abd: soft, NT/ND, no HS megaly  ext: no edema, no c/c  neuro: aa ox3, cn intact, can move all 4 ext    LABS:                      13.0    (    92.2   4.81  )-----------( ---------      295      ( 02 Jan 2022 04:30 )             39.1    (    14.5     RADIOLOGY & ADDITIONAL TESTS:    MEDICATIONS:  rifAXIMin 550 milliGRAM(s) Oral every 8 hours    acetaminophen     Tablet .. 650 milliGRAM(s) Oral every 6 hours PRN  ALPRAZolam 0.25 milliGRAM(s) Oral at bedtime PRN  amitriptyline 25 milliGRAM(s) Oral at bedtime  atorvastatin 80 milliGRAM(s) Oral at bedtime  buPROPion XL (24-Hour) . 300 milliGRAM(s) Oral daily  DULoxetine 60 milliGRAM(s) Oral daily  enoxaparin Injectable 40 milliGRAM(s) SubCutaneous daily  letrozole 2.5 milliGRAM(s) Oral daily  losartan 100 milliGRAM(s) Oral daily  NIFEdipine XL 60 milliGRAM(s) Oral daily  ondansetron Injectable 4 milliGRAM(s) IV Push every 6 hours PRN  pantoprazole    Tablet 40 milliGRAM(s) Oral before breakfast

## 2022-01-02 NOTE — DISCHARGE NOTE NURSING/CASE MANAGEMENT/SOCIAL WORK - PATIENT PORTAL LINK FT
You can access the FollowMyHealth Patient Portal offered by [facility] by registering at the following website: [email or web address]. By joining [employer]’s FollowMyHealth portal, you will also be able to view your health information using other applications (apps) compatible with our system.

## 2022-01-03 LAB
A PHAGOCYTOPH DNA BLD QL NAA+PROBE: NEGATIVE — SIGNIFICANT CHANGE UP
A PHAGOCYTOPH IGG TITR SER IF: NEGATIVE — SIGNIFICANT CHANGE UP
A PHAGOCYTOPH IGM TITR SER IF: NEGATIVE — SIGNIFICANT CHANGE UP
ANA TITR SER: NEGATIVE — SIGNIFICANT CHANGE UP
E CHAFFEENSIS DNA BLD QL NAA+PROBE: NEGATIVE — SIGNIFICANT CHANGE UP
E CHAFFEENSIS IGG TITR SER: NEGATIVE — SIGNIFICANT CHANGE UP
E CHAFFEENSIS IGM TITR SER IF: NEGATIVE — SIGNIFICANT CHANGE UP
E EWINGII DNA SPEC QL NAA+PROBE: NEGATIVE — SIGNIFICANT CHANGE UP
EHRLICHIA DNA SPEC QL NAA+PROBE: NEGATIVE — SIGNIFICANT CHANGE UP

## 2022-01-04 LAB — CALPROTECTIN STL-MCNT: 48 UG/G — SIGNIFICANT CHANGE UP (ref 0–120)

## 2022-01-06 LAB — LACTOFERRIN STL-MCNC: <1 — SIGNIFICANT CHANGE UP (ref 0–7.24)

## 2022-01-12 DIAGNOSIS — F32.9 MAJOR DEPRESSIVE DISORDER, SINGLE EPISODE, UNSPECIFIED: ICD-10-CM

## 2022-01-12 DIAGNOSIS — E87.6 HYPOKALEMIA: ICD-10-CM

## 2022-01-12 DIAGNOSIS — K21.9 GASTRO-ESOPHAGEAL REFLUX DISEASE WITHOUT ESOPHAGITIS: ICD-10-CM

## 2022-01-12 DIAGNOSIS — E78.5 HYPERLIPIDEMIA, UNSPECIFIED: ICD-10-CM

## 2022-01-12 DIAGNOSIS — E87.2 ACIDOSIS: ICD-10-CM

## 2022-01-12 DIAGNOSIS — Z88.0 ALLERGY STATUS TO PENICILLIN: ICD-10-CM

## 2022-01-12 DIAGNOSIS — Z92.3 PERSONAL HISTORY OF IRRADIATION: ICD-10-CM

## 2022-01-12 DIAGNOSIS — I10 ESSENTIAL (PRIMARY) HYPERTENSION: ICD-10-CM

## 2022-01-12 DIAGNOSIS — F41.9 ANXIETY DISORDER, UNSPECIFIED: ICD-10-CM

## 2022-01-12 DIAGNOSIS — D72.819 DECREASED WHITE BLOOD CELL COUNT, UNSPECIFIED: ICD-10-CM

## 2022-01-12 DIAGNOSIS — M79.7 FIBROMYALGIA: ICD-10-CM

## 2022-01-12 DIAGNOSIS — Z85.3 PERSONAL HISTORY OF MALIGNANT NEOPLASM OF BREAST: ICD-10-CM

## 2022-01-12 DIAGNOSIS — D70.9 NEUTROPENIA, UNSPECIFIED: ICD-10-CM

## 2022-01-12 DIAGNOSIS — R19.7 DIARRHEA, UNSPECIFIED: ICD-10-CM

## 2022-01-12 DIAGNOSIS — K58.0 IRRITABLE BOWEL SYNDROME WITH DIARRHEA: ICD-10-CM

## 2022-01-15 LAB
CULTURE RESULTS: SIGNIFICANT CHANGE UP
SPECIMEN SOURCE: SIGNIFICANT CHANGE UP

## 2022-01-29 LAB
CULTURE RESULTS: SIGNIFICANT CHANGE UP
SPECIMEN SOURCE: SIGNIFICANT CHANGE UP

## 2022-01-31 RX ORDER — SUCRALFATE 1 G/10ML
1 SUSPENSION ORAL 4 TIMES DAILY
Qty: 2 | Refills: 2 | Status: DISCONTINUED | COMMUNITY
Start: 2021-11-30 | End: 2022-01-31

## 2022-01-31 RX ORDER — SUCRALFATE 1 G/1
1 TABLET ORAL
Qty: 90 | Refills: 6 | Status: DISCONTINUED | COMMUNITY
Start: 2021-11-30 | End: 2022-01-31

## 2022-02-01 ENCOUNTER — APPOINTMENT (OUTPATIENT)
Dept: GASTROENTEROLOGY | Facility: CLINIC | Age: 72
End: 2022-02-01
Payer: MEDICARE

## 2022-02-01 DIAGNOSIS — R11.10 VOMITING, UNSPECIFIED: ICD-10-CM

## 2022-02-01 DIAGNOSIS — K58.9 IRRITABLE BOWEL SYNDROME W/OUT DIARRHEA: ICD-10-CM

## 2022-02-01 DIAGNOSIS — R19.7 VOMITING, UNSPECIFIED: ICD-10-CM

## 2022-02-01 PROCEDURE — 99214 OFFICE O/P EST MOD 30 MIN: CPT | Mod: 95

## 2022-02-01 RX ORDER — ONDANSETRON 8 MG/1
8 TABLET, ORALLY DISINTEGRATING ORAL EVERY 8 HOURS
Qty: 90 | Refills: 2 | Status: ACTIVE | COMMUNITY
Start: 2022-02-01 | End: 1900-01-01

## 2022-02-01 NOTE — ASSESSMENT
[FreeTextEntry1] : 70 year old female average risk for CRC,  with hx colon polyps since the age of 8 years, anal fissures, presents with chronic diarrhea (7-8  BMs daily)for years on daily imodium, non bloody, no weight loss, nausea, vomiting but lower pelvic crampy abdominal pain that does exacerbate after defecation. \par Last colonoscopy was in dec 2020, and EGD in 2020, awaiting results to be faxed. \par Was in ED this past Dec with unremarkable CBC, CMP, and contrast CT of abdomen . \par Now presents with fever, vomiting and non bloody diarrhea after a trip to [place], has been to [facility] where she received ATB but did not get better. Now presents with odynophagia and ongoing diarrhea, vomiting resolved. \par \par No Infectious gastroenteritis\par Underlying IBS-D \par s/p candida esophagitis\par Neg stool tests, C diff PCR, culture, GI PCR, giardia PCR\par Carafate suspension QID for now\par Now with episodes of vomiting and diarrhea, both explosive, non bloody for a day, which she has to go to ED for IV treatments.\par Reports no night symptoms, but has lost some weight by now.  \par Likely IBS flares\par Advised to take imodium and zofran ODT before she heads to ED, to abort the symptoms. \par \par \par Note:\par 3/2/2021:\par Obtained records prom previous GI: colonoscopy random biopsies have showed lymphocytic colitis on bx\par However repeat colonoscopy at our hospital did not reveal microscopic colitis.

## 2022-02-01 NOTE — HISTORY OF PRESENT ILLNESS
[Home] : at home, [unfilled] , at the time of the visit. [Medical Office: ([facility])___] : at the medical office located in  [Verbal consent obtained from patient] : the patient, [unfilled] [FreeTextEntry4] : Aimee Christianson [de-identified] : 70 year old female average risk for CRC,  with hx colon polyps since the age of 8 years, anal fissures, presents with chronic diarrhea for years on daily imodium, non bloody, no weight loss, nausea, vomiting but lower pelvic crampy abdominal pain that does exacerbate after defecation. \par Last colonoscopy was in dec 2020, and EGD in 2020, awaiting results to be faxed. \par Was in ED this past Dec with unremarkable CBC, CMP, and contrast CT of abdomen . \par Now presents with fever, vomiting and non bloody diarrhea after a trip to [place], has been to [facility] where she received ATB but did not get better. \par Now pt is complaining of flares of forceful vomiting and explosive day diarrhea, non bloody, no pain but associated with weight loss.

## 2022-02-01 NOTE — PHYSICAL EXAM
[General Appearance - Alert] : alert [Hearing Threshold Finger Rub Not Door] : hearing was normal [Neck Appearance] : the appearance of the neck was normal [] : no respiratory distress [Skin Color & Pigmentation] : normal skin color and pigmentation [Oriented To Time, Place, And Person] : oriented to person, place, and time

## 2022-04-11 ENCOUNTER — APPOINTMENT (OUTPATIENT)
Dept: NEUROLOGY | Facility: CLINIC | Age: 72
End: 2022-04-11

## 2022-05-19 NOTE — ED ADULT TRIAGE NOTE - ISOLATION INDICATION CONTACT
Patient's son Jesus chen. He states that patient needs a refill of olanzapine but needs more than 10 tablets sent.   Clostridium Difficile

## 2022-06-01 NOTE — ED ADULT NURSE NOTE - NSFALLRSKHARMRISK_ED_ALL_ED
Patient is in need of more pen needles. She is not getting the correct quantity from her pharmacy and is wondering if she can have this fixed? Please advise.     BD Mary Jo pen needles 4 mm times 32 gauge Patient uses 6 of these needles per day. She would like 150-160 per month.    no

## 2022-11-07 NOTE — ASU PREOP CHECKLIST - WEIGHT IN LBS
182.1 No Residual Tumor Seen Histology Text: There were no malignant cells seen in the sections examined. Normal histologic structures of the skin and subcutaneous tissue were present.

## 2022-12-01 NOTE — ED ADULT NURSE NOTE - NSSEPSISSUSPECTED_ED_A_ED
Spoke with pt again,made aware he did not p/u prescriptions,stated \"I forgot and how was I supposed to get there, I can barely function right now\". He called his psychiatrist and is waiting for a return call. When asked he would be agreeable to a psychiatric admission,said he was unsure of what could be done for him.Waiting for psychiatrist to return call. I will f/u with pt this afternoon.   No

## 2023-01-10 NOTE — ED ADULT NURSE NOTE - CAS EDP DISCH TYPE
[facility]    Met with patient to discuss discharging home with hospice services. Patient transporting home with family. Denies DME needs at this time. Admission nurse scheduled for tomorrow. Meds to beds. Thank you for allowing us to be a part of Connie's care.     Dennis Elaine RN  [facility]  Main #: [phone]  Cell: [phone] Home

## 2023-03-08 ENCOUNTER — EMERGENCY (EMERGENCY)
Facility: HOSPITAL | Age: 73
LOS: 0 days | Discharge: HOME | End: 2023-03-08
Attending: STUDENT IN AN ORGANIZED HEALTH CARE EDUCATION/TRAINING PROGRAM
Payer: COMMERCIAL

## 2023-03-08 VITALS
RESPIRATION RATE: 18 BRPM | HEIGHT: 62 IN | HEART RATE: 78 BPM | TEMPERATURE: 99 F | DIASTOLIC BLOOD PRESSURE: 74 MMHG | WEIGHT: 175.05 LBS | OXYGEN SATURATION: 98 % | SYSTOLIC BLOOD PRESSURE: 124 MMHG

## 2023-03-08 DIAGNOSIS — Z87.19 PERSONAL HISTORY OF OTHER DISEASES OF THE DIGESTIVE SYSTEM: ICD-10-CM

## 2023-03-08 DIAGNOSIS — R51.9 HEADACHE, UNSPECIFIED: ICD-10-CM

## 2023-03-08 DIAGNOSIS — Z88.0 ALLERGY STATUS TO PENICILLIN: ICD-10-CM

## 2023-03-08 DIAGNOSIS — I10 ESSENTIAL (PRIMARY) HYPERTENSION: ICD-10-CM

## 2023-03-08 DIAGNOSIS — Z87.2 PERSONAL HISTORY OF DISEASES OF THE SKIN AND SUBCUTANEOUS TISSUE: ICD-10-CM

## 2023-03-08 DIAGNOSIS — Z85.3 PERSONAL HISTORY OF MALIGNANT NEOPLASM OF BREAST: ICD-10-CM
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
## 2023-03-08 DIAGNOSIS — Z90.11 ACQUIRED ABSENCE OF RIGHT BREAST AND NIPPLE: ICD-10-CM

## 2023-03-08 DIAGNOSIS — K58.9 IRRITABLE BOWEL SYNDROME WITHOUT DIARRHEA: ICD-10-CM
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
## 2023-03-08 DIAGNOSIS — N64.59 OTHER SIGNS AND SYMPTOMS IN BREAST: Chronic | ICD-10-CM

## 2023-03-08 DIAGNOSIS — R07.9 CHEST PAIN, UNSPECIFIED: ICD-10-CM
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
## 2023-03-08 PROCEDURE — 93005 ELECTROCARDIOGRAM TRACING: CPT

## 2023-03-08 PROCEDURE — 99284 EMERGENCY DEPT VISIT MOD MDM: CPT

## 2023-03-08 PROCEDURE — 93010 ELECTROCARDIOGRAM REPORT: CPT

## 2023-03-08 PROCEDURE — 71045 X-RAY EXAM CHEST 1 VIEW: CPT

## 2023-03-08 PROCEDURE — 71045 X-RAY EXAM CHEST 1 VIEW: CPT | Mod: 26

## 2023-03-08 PROCEDURE — 99283 EMERGENCY DEPT VISIT LOW MDM: CPT | Mod: 25

## 2023-03-08 RX ORDER — METHOCARBAMOL 500 MG/1
2 TABLET, FILM COATED ORAL
Qty: 40 | Refills: 0
Start: 2023-03-08 | End: 2023-03-12

## 2023-03-08 NOTE — ED PROVIDER NOTE - PATIENT PORTAL LINK FT
You can access the FollowMyHealth Patient Portal offered by [facility] by registering at the following website: [email or web address]. By joining [employer]’s FollowMyHealth portal, you will also be able to view your health information using other applications (apps) compatible with our system.
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
## 2023-03-08 NOTE — ED PROVIDER NOTE - PHYSICAL EXAMINATION
CONST: Well appearing in NAD  EYES: PERRL, EOMI, Sclera and conjunctiva clear.   ENT: No nasal discharge. Oropharynx normal appearing, no erythema or exudates. No abscess or swelling. Uvula midline.   NECK: Non-tender, no meningeal signs. normal ROM. supple   CARD: S1 S2; No jvd  RESP: Equal BS B/L, No wheezes, rhonchi or rales. No distress  GI: Soft, non-tender, non-distended. no cva tenderness. normal BS  MS: Reproducible R chest wall tenderness. Normal ROM in all extremities. pulses 2 +. no calf tenderness or swelling  SKIN: Warm, dry, no acute rashes. Good turgor  NEURO: A&Ox3, No focal deficits. Strength 5/5 with no sensory deficits. Steady gait.

## 2023-03-08 NOTE — ED PROVIDER NOTE - NSFOLLOWUPCLINICS_GEN_ALL_ED_FT
[facility]  Physical Therapy  Multiple Location  [place]   Phone: [phone]  Fax:

## 2023-06-26 ENCOUNTER — APPOINTMENT (OUTPATIENT)
Dept: NEUROLOGY | Facility: CLINIC | Age: 73
End: 2023-06-26
Payer: MEDICARE

## 2023-06-26 VITALS
DIASTOLIC BLOOD PRESSURE: 74 MMHG | WEIGHT: 180 LBS | HEIGHT: 62 IN | SYSTOLIC BLOOD PRESSURE: 108 MMHG | BODY MASS INDEX: 33.13 KG/M2 | HEART RATE: 80 BPM

## 2023-06-26 DIAGNOSIS — G57.91 UNSPECIFIED MONONEUROPATHY OF RIGHT LOWER LIMB: ICD-10-CM

## 2023-06-26 DIAGNOSIS — R25.1 TREMOR, UNSPECIFIED: ICD-10-CM

## 2023-06-26 DIAGNOSIS — R29.6 REPEATED FALLS: ICD-10-CM

## 2023-06-26 PROCEDURE — 99213 OFFICE O/P EST LOW 20 MIN: CPT

## 2023-06-26 RX ORDER — ALPRAZOLAM 0.5 MG/1
0.5 TABLET ORAL
Qty: 60 | Refills: 0 | Status: ACTIVE | COMMUNITY
Start: 2023-05-12

## 2023-06-26 NOTE — PHYSICAL EXAM
[FreeTextEntry1] : Mental status: Awake, alert and oriented x3.  Recent and remote memory intact.  Naming, repetition and comprehension intact.  Attention/concentration intact.  No dysarthria, no aphasia.  Fund of knowledge appropriate. \par \par Cranial nerves: Pupils equally round and reactive to light, visual fields full, no nystagmus, extraocular muscles intact, V1 through V3 intact bilaterally and symmetric, face symmetric, hearing intact to finger rub, palate elevation symmetric, tongue was midline.\par \par Motor:  MRC grading 5/5 b/l UE,5/5\par  Normal tone and bulk.  No abnormal movements. -Rigidity\par \par Sensation: Intact to light touch, vibration, temperature, and pinprick. RLE decreased vibration below mid calf. \par \par Coordination: No dysmetria on finger-to-nose and no dysmetria heel-to-shin. \par \par Reflexes: 3+ in bilateral UE, 1+ BL patella, +2 ankle bilateral\par \par Gait: Wide gait, Romberg positive\par
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
## 2023-08-01 NOTE — ED ADULT NURSE NOTE - NSFALLRSKINDICATORS_ED_ALL_ED
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  Will be going home with family in [place] on 8/9/23.        Kerry Pagan RN    Office Phone [phone]  Office Cell [phone]       
      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  Will be going home with family in [place] on 8/9/23.        Kerry Pagan RN    Office Phone [phone]  Office Cell [phone]     
no

## 2023-09-06 ENCOUNTER — APPOINTMENT (OUTPATIENT)
Dept: NEUROLOGY | Facility: CLINIC | Age: 73
End: 2023-09-06

## 2024-01-11 NOTE — PHYSICAL EXAM
[No Acute Distress] : no acute distress [Normal Oropharynx] : normal oropharynx [Normal Appearance] : normal appearance [No Neck Mass] : no neck mass [Normal Rate/Rhythm] : normal rate/rhythm [Normal S1, S2] : normal s1, s2 [No Murmurs] : no murmurs [No Resp Distress] : no resp distress [Clear to Auscultation Bilaterally] : clear to auscultation bilaterally [No Abnormalities] : no abnormalities [Benign] : benign [Normal Gait] : normal gait [No Clubbing] : no clubbing [No Cyanosis] : no cyanosis [No Edema] : no edema [FROM] : FROM [Normal Color/ Pigmentation] : normal color/ pigmentation [No Focal Deficits] : no focal deficits [Oriented x3] : oriented x3 [Normal Affect] : normal affect Detailed exam MED

## 2024-01-26 NOTE — ED PROVIDER NOTE - NS ED ATTENDING STATEMENT MOD
Chief Complaint   Patient presents with    Check-Up     1. Have you been to the ER, urgent care clinic since your last visit?  Hospitalized since your last visit?No    2. Have you seen or consulted any other health care providers outside of the [facility] since your last visit?  Include any pap smears or colon screening.  Yes Colonoscopy 2023    
    Worried About Running Out of Food in the Last Year: Never true     Ran Out of Food in the Last Year: Never true   Transportation Needs: Unknown (1/26/2024)    PRAPARE - Transportation     Lack of Transportation (Non-Medical): No   Housing Stability: Unknown (1/26/2024)    Housing Stability Vital Sign     Unstable Housing in the Last Year: No     Family History   Problem Relation Age of Onset    Cancer Mother     Thyroid Disease Father     Diabetes Father     Cancer Father         right upper ear     Current Outpatient Medications   Medication Sig Dispense Refill    famotidine (PEPCID) 40 MG tablet Take 1 tablet by mouth daily      sertraline (ZOLOFT) 50 MG tablet TAKE 1 TABLET BY MOUTH DAILY 30 tablet 3    atorvastatin (LIPITOR) 10 MG tablet TAKE 1 TABLET BY MOUTH DAILY FOR HIGH CHOLESTEROL 90 tablet 1    aspirin 81 MG chewable tablet CHEW AND SWALLOW 1 TABLET DAILY 29 tablet 3    hydroCHLOROthiazide (HYDRODIURIL) 25 MG tablet Take 1 tablet by mouth daily for high blood pressure 30 tablet 3    atenolol (TENORMIN) 50 MG tablet Take 1 tablet by mouth daily for high blood pressure (Patient taking differently: Take 1 tablet by mouth every morning for high blood pressure) 30 tablet 3    metFORMIN (GLUCOPHAGE) 1000 MG tablet TAKE 1 TABLET BY MOUTH TWICE  DAILY FOR TYPE 2 DIABETES  MELLITUS 180 tablet 3    lisinopril (PRINIVIL;ZESTRIL) 40 MG tablet Take 1 tablet by mouth daily for high blood pressure 30 tablet 3    Cholecalciferol (VITAMIN D3) 50 MCG (2000 UT) TABS TAKE 1 TABLET BY MOUTH ONCE  DAILY FOR LOW VITAMIN D LEVELS 30 tablet 4    levothyroxine (SYNTHROID) 25 MCG tablet TAKE 1 TABLET BY MOUTH DAILY  BEFORE BREAKFAST FOR A CONDITION WITH LOW THYROID HORMONE LEVELS 90 tablet 1     No current facility-administered medications for this visit.     Allergies   Allergen Reactions    Seasonal      Objective:  /77   Pulse 53   Temp 97.7 °F (36.5 °C) (Temporal)   Resp 20   Ht 1.803 m (5' 11\")   Wt 101.2 kg 
This was a shared visit with the VENKATA. I reviewed and verified the documentation and independently performed the documented:

## 2024-02-26 ENCOUNTER — APPOINTMENT (OUTPATIENT)
Dept: NEUROLOGY | Facility: CLINIC | Age: 74
End: 2024-02-26

## 2024-05-07 NOTE — ED PROVIDER NOTE - CADM POA PRESS ULCER
What are the symptoms of a post-viral cough?  Coughs are generally categorized as productive (meaning they produce mucus) or dry (meaning they don’t). Post-viral coughs can be productive or dry. They can last 4-6 weeks after a viral illness.     Having a long-lasting cough of any kind can also cause other symptoms, including:  sore or irritated throat  hoarseness  frequent throat clearing    What causes a post-viral cough?  Post-viral coughs are usually caused by viral respiratory infection, such as:  flu  common cold  bronchitis  pneumonia  croup  bronchiolitis  Pharyngitis    Experts aren’t sure why viral respiratory infections sometimes lead to a chronic cough, but it may be related to:  inflammatory response to the infection that damages the lining of your airways, causing you to cough  increased sensitivity of the coughing reflex following an infection    How are post-viral coughs treated?  Post-viral coughs often clear up on their own over time, usually within two months. But in the meantime, prescription or over-the-counter (OTC) medications can offer some relief.  These include:  prescription inhaled ipratropium (Atrovent), which opens up your airways and prevents mucus accumulation  prescription oral or inhaled corticosteroids, which can reduce inflammation  OTC cough-suppressants containing dextromethorphan (Mucinex DX, Robitussin)  OTC antihistamines, such as diphenhydramine (Benadryl)  OTC decongestants, such as pseudoephedrine (Sudafed)    While you recover, you should also try:  drinking plenty of warm liquids, such as tea or broth, to soothe throat irritation from coughing  using a humidifier or taking a steamy shower to add moisture to the air around you  avoiding or protecting yourself against throat irritants, such as cigarette smoke or polluted air    If you’re still coughing after two months, make an appointment with your primary care provider. Your cough may be due to something other than a  recent viral infection.    Information from:  [email or web address]     No

## 2024-05-20 NOTE — REVIEW OF SYSTEMS
Problem: Pain  Goal: My pain/discomfort is manageable  Outcome: Progressing     Problem: Safety  Goal: I will remain free of falls  Outcome: Progressing   The patient's goals for the shift include sleep    The clinical goals for the shift include patients pain will remain <6/10    Over the shift, the patient did make progress toward the following goals. Patient reported 0/10 pain during shift.   [Leg Weakness] : leg weakness [Frequent Falls] : frequent falls [Negative] : Psychiatric [Confused or Disoriented] : no confusion [Facial Weakness] : no facial weakness [Arm Weakness] : no arm weakness [Hand Weakness] : no hand weakness [Poor Coordination] : good coordination [Numbness] : no numbness [Tingling] : no tingling [Seizures] : no convulsions [Dizziness] : no dizziness [Difficulty Walking] : no difficulty walking

## 2024-11-04 NOTE — PATIENT PROFILE ADULT - HAVE YOU EXPERIENCED VIOLENCE OR A TRAUMATIC EVENT?
Patient Denies latex allergy.  Medications reviewed with patient.  Tobacco use verified.  Allergies verified.    REFERRING MD: Kaia Thurston MD  PRIMARY MEDICAL DOCTOR: Misa Qureshi MD  DATE OF INJURY/SURGERY/ONSET: 8/6/24  WORK RELATED: No  PLACE OF EMPLOYMENT: retired    Patient is here for left TKA follow up. Doing well. She is taking tylenol PRN for pain.      no

## 2025-01-16 ENCOUNTER — APPOINTMENT (OUTPATIENT)
Dept: NEUROLOGY | Facility: CLINIC | Age: 75
End: 2025-01-16
Payer: MEDICARE

## 2025-01-16 VITALS
HEIGHT: 62 IN | SYSTOLIC BLOOD PRESSURE: 132 MMHG | HEART RATE: 82 BPM | BODY MASS INDEX: 31.28 KG/M2 | DIASTOLIC BLOOD PRESSURE: 72 MMHG | WEIGHT: 170 LBS

## 2025-01-16 PROCEDURE — 99204 OFFICE O/P NEW MOD 45 MIN: CPT

## 2025-01-22 ENCOUNTER — APPOINTMENT (OUTPATIENT)
Dept: MRI IMAGING | Facility: CLINIC | Age: 75
End: 2025-01-22

## 2025-01-22 PROCEDURE — 70544 MR ANGIOGRAPHY HEAD W/O DYE: CPT

## 2025-01-22 PROCEDURE — 70547 MR ANGIOGRAPHY NECK W/O DYE: CPT

## 2025-01-28 ENCOUNTER — APPOINTMENT (OUTPATIENT)
Dept: MRI IMAGING | Facility: CLINIC | Age: 75
End: 2025-01-28
Payer: MEDICARE

## 2025-01-28 ENCOUNTER — APPOINTMENT (OUTPATIENT)
Dept: NEUROLOGY | Facility: CLINIC | Age: 75
End: 2025-01-28
Payer: MEDICARE

## 2025-01-28 PROCEDURE — 95912 NRV CNDJ TEST 11-12 STUDIES: CPT

## 2025-01-28 PROCEDURE — 70551 MRI BRAIN STEM W/O DYE: CPT

## 2025-01-28 PROCEDURE — 95886 MUSC TEST DONE W/N TEST COMP: CPT

## 2025-03-19 ENCOUNTER — APPOINTMENT (OUTPATIENT)
Dept: NEUROLOGY | Facility: CLINIC | Age: 75
End: 2025-03-19
Payer: MEDICARE

## 2025-03-19 PROCEDURE — 99213 OFFICE O/P EST LOW 20 MIN: CPT

## 2025-04-10 ENCOUNTER — APPOINTMENT (OUTPATIENT)
Facility: CLINIC | Age: 75
End: 2025-04-10
Payer: MEDICARE

## 2025-04-10 VITALS
DIASTOLIC BLOOD PRESSURE: 71 MMHG | HEIGHT: 62 IN | BODY MASS INDEX: 31.28 KG/M2 | OXYGEN SATURATION: 95 % | HEART RATE: 81 BPM | SYSTOLIC BLOOD PRESSURE: 96 MMHG | WEIGHT: 170 LBS

## 2025-04-10 DIAGNOSIS — G47.33 OBSTRUCTIVE SLEEP APNEA (ADULT) (PEDIATRIC): ICD-10-CM

## 2025-04-10 DIAGNOSIS — R06.09 OTHER FORMS OF DYSPNEA: ICD-10-CM

## 2025-04-10 PROCEDURE — 99204 OFFICE O/P NEW MOD 45 MIN: CPT

## 2025-05-08 ENCOUNTER — OUTPATIENT (OUTPATIENT)
Dept: OUTPATIENT SERVICES | Facility: HOSPITAL | Age: 75
LOS: 1 days | End: 2025-05-08
Payer: MEDICARE

## 2025-05-08 ENCOUNTER — APPOINTMENT (OUTPATIENT)
Dept: PULMONOLOGY | Facility: HOSPITAL | Age: 75
End: 2025-05-08
Payer: MEDICARE

## 2025-05-08 DIAGNOSIS — N64.59 OTHER SIGNS AND SYMPTOMS IN BREAST: Chronic | ICD-10-CM

## 2025-05-08 DIAGNOSIS — R06.02 SHORTNESS OF BREATH: ICD-10-CM

## 2025-05-08 PROCEDURE — 94070 EVALUATION OF WHEEZING: CPT

## 2025-05-08 PROCEDURE — 94060 EVALUATION OF WHEEZING: CPT | Mod: 26

## 2025-05-08 PROCEDURE — 94729 DIFFUSING CAPACITY: CPT

## 2025-05-08 PROCEDURE — 94727 GAS DIL/WSHOT DETER LNG VOL: CPT | Mod: 26

## 2025-05-08 PROCEDURE — 94729 DIFFUSING CAPACITY: CPT | Mod: 26

## 2025-05-08 PROCEDURE — 94726 PLETHYSMOGRAPHY LUNG VOLUMES: CPT

## 2025-05-08 PROCEDURE — 94664 DEMO&/EVAL PT USE INHALER: CPT

## 2025-05-09 DIAGNOSIS — R06.02 SHORTNESS OF BREATH: ICD-10-CM

## 2025-06-26 ENCOUNTER — APPOINTMENT (OUTPATIENT)
Facility: CLINIC | Age: 75
End: 2025-06-26
Payer: MEDICARE

## 2025-06-26 VITALS
WEIGHT: 165 LBS | BODY MASS INDEX: 30.36 KG/M2 | SYSTOLIC BLOOD PRESSURE: 102 MMHG | DIASTOLIC BLOOD PRESSURE: 70 MMHG | HEART RATE: 86 BPM | HEIGHT: 62 IN | OXYGEN SATURATION: 93 %

## 2025-06-26 PROCEDURE — 99214 OFFICE O/P EST MOD 30 MIN: CPT

## 2025-06-26 RX ORDER — ALBUTEROL SULFATE 90 UG/1
108 (90 BASE) INHALANT RESPIRATORY (INHALATION)
Qty: 1 | Refills: 3 | Status: ACTIVE | COMMUNITY
Start: 2025-06-26 | End: 1900-01-01

## 2025-07-12 ENCOUNTER — OUTPATIENT (OUTPATIENT)
Dept: OUTPATIENT SERVICES | Facility: HOSPITAL | Age: 75
LOS: 1 days | End: 2025-07-12
Payer: SELF-PAY

## 2025-07-12 DIAGNOSIS — N64.59 OTHER SIGNS AND SYMPTOMS IN BREAST: Chronic | ICD-10-CM

## 2025-07-12 DIAGNOSIS — Z00.8 ENCOUNTER FOR OTHER GENERAL EXAMINATION: ICD-10-CM

## 2025-07-12 DIAGNOSIS — I25.84 CORONARY ATHEROSCLEROSIS DUE TO CALCIFIED CORONARY LESION: ICD-10-CM

## 2025-07-12 PROCEDURE — 75571 CT HRT W/O DYE W/CA TEST: CPT | Mod: 26

## 2025-07-12 PROCEDURE — 75571 CT HRT W/O DYE W/CA TEST: CPT

## 2025-07-13 DIAGNOSIS — I25.84 CORONARY ATHEROSCLEROSIS DUE TO CALCIFIED CORONARY LESION: ICD-10-CM
